# Patient Record
Sex: MALE | Race: BLACK OR AFRICAN AMERICAN | Employment: OTHER | URBAN - METROPOLITAN AREA
[De-identification: names, ages, dates, MRNs, and addresses within clinical notes are randomized per-mention and may not be internally consistent; named-entity substitution may affect disease eponyms.]

---

## 2019-01-15 ENCOUNTER — HOSPITAL ENCOUNTER (OUTPATIENT)
Dept: LAB | Age: 79
Discharge: HOME | End: 2019-01-15

## 2019-01-15 PROCEDURE — 88305 TISSUE EXAM BY PATHOLOGIST: CPT

## 2021-01-05 ENCOUNTER — APPOINTMENT (OUTPATIENT)
Dept: CT IMAGING | Age: 81
DRG: 066 | End: 2021-01-05
Attending: EMERGENCY MEDICINE
Payer: MEDICARE

## 2021-01-05 ENCOUNTER — APPOINTMENT (OUTPATIENT)
Dept: GENERAL RADIOLOGY | Age: 81
DRG: 066 | End: 2021-01-05
Attending: EMERGENCY MEDICINE
Payer: MEDICARE

## 2021-01-05 ENCOUNTER — HOSPITAL ENCOUNTER (INPATIENT)
Age: 81
LOS: 8 days | Discharge: HOME | DRG: 066 | End: 2021-01-13
Attending: EMERGENCY MEDICINE | Admitting: NEUROLOGICAL SURGERY
Payer: MEDICARE

## 2021-01-05 DIAGNOSIS — I60.9 SUBARACHNOID HEMORRHAGE (HCC): ICD-10-CM

## 2021-01-05 DIAGNOSIS — I60.9 SAH (SUBARACHNOID HEMORRHAGE) (HCC): Primary | ICD-10-CM

## 2021-01-05 DIAGNOSIS — R30.0 DYSURIA: ICD-10-CM

## 2021-01-05 LAB
ALBUMIN SERPL-MCNC: 3.8 G/DL (ref 3.2–4.6)
ALBUMIN/GLOB SERPL: 0.9 {RATIO} (ref 1.2–3.5)
ALP SERPL-CCNC: 91 U/L (ref 50–136)
ALT SERPL-CCNC: 25 U/L (ref 12–65)
AMPHET UR QL SCN: NEGATIVE
ANION GAP SERPL CALC-SCNC: 4 MMOL/L (ref 7–16)
APTT PPP: 32.7 SEC (ref 24.1–35.1)
AST SERPL-CCNC: 21 U/L (ref 15–37)
ATRIAL RATE: 104 BPM
BARBITURATES UR QL SCN: NEGATIVE
BASOPHILS # BLD: 0.1 K/UL (ref 0–0.2)
BASOPHILS NFR BLD: 1 % (ref 0–2)
BENZODIAZ UR QL: NEGATIVE
BILIRUB SERPL-MCNC: 0.7 MG/DL (ref 0.2–1.1)
BUN SERPL-MCNC: 11 MG/DL (ref 8–23)
CALCIUM SERPL-MCNC: 9.1 MG/DL (ref 8.3–10.4)
CALCULATED P AXIS, ECG09: 84 DEGREES
CALCULATED R AXIS, ECG10: 62 DEGREES
CALCULATED T AXIS, ECG11: 59 DEGREES
CANNABINOIDS UR QL SCN: NEGATIVE
CHLORIDE SERPL-SCNC: 108 MMOL/L (ref 98–107)
CHOLEST SERPL-MCNC: 147 MG/DL
CK SERPL-CCNC: 161 U/L (ref 21–215)
CO2 SERPL-SCNC: 27 MMOL/L (ref 21–32)
COCAINE UR QL SCN: NEGATIVE
CREAT SERPL-MCNC: 1.53 MG/DL (ref 0.8–1.5)
DIAGNOSIS, 93000: NORMAL
DIFFERENTIAL METHOD BLD: ABNORMAL
EOSINOPHIL # BLD: 0.5 K/UL (ref 0–0.8)
EOSINOPHIL NFR BLD: 10 % (ref 0.5–7.8)
ERYTHROCYTE [DISTWIDTH] IN BLOOD BY AUTOMATED COUNT: 13 % (ref 11.9–14.6)
GLOBULIN SER CALC-MCNC: 4.4 G/DL (ref 2.3–3.5)
GLUCOSE SERPL-MCNC: 141 MG/DL (ref 65–100)
HCT VFR BLD AUTO: 41.3 % (ref 41.1–50.3)
HDLC SERPL-MCNC: 50 MG/DL (ref 40–60)
HDLC SERPL: 2.9 {RATIO}
HGB BLD-MCNC: 13.4 G/DL (ref 13.6–17.2)
IMM GRANULOCYTES # BLD AUTO: 0 K/UL (ref 0–0.5)
IMM GRANULOCYTES NFR BLD AUTO: 0 % (ref 0–5)
INR PPP: 1
LDLC SERPL CALC-MCNC: 84.8 MG/DL
LIPID PROFILE,FLP: NORMAL
LYMPHOCYTES # BLD: 2 K/UL (ref 0.5–4.6)
LYMPHOCYTES NFR BLD: 38 % (ref 13–44)
MAGNESIUM SERPL-MCNC: 1.8 MG/DL (ref 1.8–2.4)
MAGNESIUM SERPL-MCNC: 2.2 MG/DL (ref 1.8–2.4)
MCH RBC QN AUTO: 31.7 PG (ref 26.1–32.9)
MCHC RBC AUTO-ENTMCNC: 32.4 G/DL (ref 31.4–35)
MCV RBC AUTO: 97.6 FL (ref 79.6–97.8)
METHADONE UR QL: NEGATIVE
MONOCYTES # BLD: 0.4 K/UL (ref 0.1–1.3)
MONOCYTES NFR BLD: 8 % (ref 4–12)
NEUTS SEG # BLD: 2.2 K/UL (ref 1.7–8.2)
NEUTS SEG NFR BLD: 43 % (ref 43–78)
NRBC # BLD: 0 K/UL (ref 0–0.2)
OPIATES UR QL: NEGATIVE
P-R INTERVAL, ECG05: 156 MS
PCP UR QL: NEGATIVE
PLATELET # BLD AUTO: 201 K/UL (ref 150–450)
PMV BLD AUTO: 10.3 FL (ref 9.4–12.3)
POTASSIUM SERPL-SCNC: 3.5 MMOL/L (ref 3.5–5.1)
PROT SERPL-MCNC: 8.2 G/DL (ref 6.3–8.2)
PROTHROMBIN TIME: 13.5 SEC (ref 12.5–14.7)
Q-T INTERVAL, ECG07: 318 MS
QRS DURATION, ECG06: 80 MS
QTC CALCULATION (BEZET), ECG08: 418 MS
RBC # BLD AUTO: 4.23 M/UL (ref 4.23–5.6)
SODIUM SERPL-SCNC: 139 MMOL/L (ref 138–145)
TRIGL SERPL-MCNC: 61 MG/DL (ref 35–150)
TROPONIN-HIGH SENSITIVITY: 18.5 PG/ML (ref 0–14)
TROPONIN-HIGH SENSITIVITY: 9.8 PG/ML (ref 0–14)
VENTRICULAR RATE, ECG03: 104 BPM
VLDLC SERPL CALC-MCNC: 12.2 MG/DL (ref 6–23)
WBC # BLD AUTO: 5.1 K/UL (ref 4.3–11.1)

## 2021-01-05 PROCEDURE — 36573 INSJ PICC RS&I 5 YR+: CPT | Performed by: NURSE PRACTITIONER

## 2021-01-05 PROCEDURE — 74011000258 HC RX REV CODE- 258: Performed by: EMERGENCY MEDICINE

## 2021-01-05 PROCEDURE — 74011250636 HC RX REV CODE- 250/636: Performed by: NURSE PRACTITIONER

## 2021-01-05 PROCEDURE — 85025 COMPLETE CBC W/AUTO DIFF WBC: CPT

## 2021-01-05 PROCEDURE — 83735 ASSAY OF MAGNESIUM: CPT

## 2021-01-05 PROCEDURE — 85730 THROMBOPLASTIN TIME PARTIAL: CPT

## 2021-01-05 PROCEDURE — 80053 COMPREHEN METABOLIC PANEL: CPT

## 2021-01-05 PROCEDURE — 74011000250 HC RX REV CODE- 250: Performed by: NEUROLOGICAL SURGERY

## 2021-01-05 PROCEDURE — 83036 HEMOGLOBIN GLYCOSYLATED A1C: CPT

## 2021-01-05 PROCEDURE — 74011250636 HC RX REV CODE- 250/636: Performed by: EMERGENCY MEDICINE

## 2021-01-05 PROCEDURE — 76937 US GUIDE VASCULAR ACCESS: CPT

## 2021-01-05 PROCEDURE — 65610000006 HC RM INTENSIVE CARE

## 2021-01-05 PROCEDURE — 82550 ASSAY OF CK (CPK): CPT

## 2021-01-05 PROCEDURE — 74011250636 HC RX REV CODE- 250/636: Performed by: NEUROLOGICAL SURGERY

## 2021-01-05 PROCEDURE — 99285 EMERGENCY DEPT VISIT HI MDM: CPT

## 2021-01-05 PROCEDURE — 99222 1ST HOSP IP/OBS MODERATE 55: CPT | Performed by: NEUROLOGICAL SURGERY

## 2021-01-05 PROCEDURE — 80307 DRUG TEST PRSMV CHEM ANLYZR: CPT

## 2021-01-05 PROCEDURE — 71045 X-RAY EXAM CHEST 1 VIEW: CPT

## 2021-01-05 PROCEDURE — 74011250637 HC RX REV CODE- 250/637: Performed by: NURSE PRACTITIONER

## 2021-01-05 PROCEDURE — C1751 CATH, INF, PER/CENT/MIDLINE: HCPCS

## 2021-01-05 PROCEDURE — 74011000250 HC RX REV CODE- 250: Performed by: EMERGENCY MEDICINE

## 2021-01-05 PROCEDURE — 74011000636 HC RX REV CODE- 636: Performed by: EMERGENCY MEDICINE

## 2021-01-05 PROCEDURE — 94762 N-INVAS EAR/PLS OXIMTRY CONT: CPT

## 2021-01-05 PROCEDURE — 85610 PROTHROMBIN TIME: CPT

## 2021-01-05 PROCEDURE — 80061 LIPID PANEL: CPT

## 2021-01-05 PROCEDURE — C8929 TTE W OR WO FOL WCON,DOPPLER: HCPCS

## 2021-01-05 PROCEDURE — 84484 ASSAY OF TROPONIN QUANT: CPT

## 2021-01-05 PROCEDURE — 70498 CT ANGIOGRAPHY NECK: CPT

## 2021-01-05 PROCEDURE — 70450 CT HEAD/BRAIN W/O DYE: CPT

## 2021-01-05 RX ORDER — FENTANYL CITRATE 50 UG/ML
50 INJECTION, SOLUTION INTRAMUSCULAR; INTRAVENOUS
Status: DISCONTINUED | OUTPATIENT
Start: 2021-01-05 | End: 2021-01-13 | Stop reason: HOSPADM

## 2021-01-05 RX ORDER — SODIUM CHLORIDE 9 MG/ML
75 INJECTION, SOLUTION INTRAVENOUS CONTINUOUS
Status: DISCONTINUED | OUTPATIENT
Start: 2021-01-05 | End: 2021-01-10

## 2021-01-05 RX ORDER — AMOXICILLIN 250 MG
2 CAPSULE ORAL
Status: DISCONTINUED | OUTPATIENT
Start: 2021-01-05 | End: 2021-01-13 | Stop reason: HOSPADM

## 2021-01-05 RX ORDER — PRAVASTATIN SODIUM 40 MG/1
40 TABLET ORAL
COMMUNITY

## 2021-01-05 RX ORDER — TAMSULOSIN HYDROCHLORIDE 0.4 MG/1
0.4 CAPSULE ORAL DAILY
COMMUNITY

## 2021-01-05 RX ORDER — TELMISARTAN AND HYDROCHLORTHIAZIDE 40; 12.5 MG/1; MG/1
1 TABLET ORAL DAILY
COMMUNITY

## 2021-01-05 RX ORDER — KETOROLAC TROMETHAMINE 30 MG/ML
15 INJECTION, SOLUTION INTRAMUSCULAR; INTRAVENOUS
Status: DISPENSED | OUTPATIENT
Start: 2021-01-05 | End: 2021-01-10

## 2021-01-05 RX ORDER — LATANOPROST 50 UG/ML
1 SOLUTION/ DROPS OPHTHALMIC
COMMUNITY

## 2021-01-05 RX ORDER — ACETAMINOPHEN 325 MG/1
650 TABLET ORAL
Status: DISCONTINUED | OUTPATIENT
Start: 2021-01-05 | End: 2021-01-11

## 2021-01-05 RX ORDER — MAGNESIUM SULFATE HEPTAHYDRATE 40 MG/ML
2 INJECTION, SOLUTION INTRAVENOUS DAILY PRN
Status: DISCONTINUED | OUTPATIENT
Start: 2021-01-05 | End: 2021-01-12

## 2021-01-05 RX ORDER — ATORVASTATIN CALCIUM 40 MG/1
40 TABLET, FILM COATED ORAL
Status: DISCONTINUED | OUTPATIENT
Start: 2021-01-05 | End: 2021-01-13 | Stop reason: HOSPADM

## 2021-01-05 RX ORDER — LANOLIN ALCOHOL/MO/W.PET/CERES
400 CREAM (GRAM) TOPICAL 2 TIMES DAILY
Status: COMPLETED | OUTPATIENT
Start: 2021-01-06 | End: 2021-01-10

## 2021-01-05 RX ORDER — NIMODIPINE 30 MG/1
60 CAPSULE, LIQUID FILLED ORAL EVERY 4 HOURS
Status: DISCONTINUED | OUTPATIENT
Start: 2021-01-05 | End: 2021-01-10

## 2021-01-05 RX ORDER — SODIUM CHLORIDE 0.9 % (FLUSH) 0.9 %
10 SYRINGE (ML) INJECTION
Status: COMPLETED | OUTPATIENT
Start: 2021-01-05 | End: 2021-01-05

## 2021-01-05 RX ORDER — LATANOPROST 50 UG/ML
1 SOLUTION/ DROPS OPHTHALMIC EVERY EVENING
Status: CANCELLED | OUTPATIENT
Start: 2021-01-05

## 2021-01-05 RX ORDER — MAGNESIUM SULFATE HEPTAHYDRATE 40 MG/ML
4 INJECTION, SOLUTION INTRAVENOUS DAILY PRN
Status: DISCONTINUED | OUTPATIENT
Start: 2021-01-05 | End: 2021-01-12

## 2021-01-05 RX ORDER — BUDESONIDE AND FORMOTEROL FUMARATE DIHYDRATE 160; 4.5 UG/1; UG/1
2 AEROSOL RESPIRATORY (INHALATION) DAILY
COMMUNITY

## 2021-01-05 RX ORDER — ONDANSETRON 2 MG/ML
4 INJECTION INTRAMUSCULAR; INTRAVENOUS
Status: DISCONTINUED | OUTPATIENT
Start: 2021-01-05 | End: 2021-01-13 | Stop reason: HOSPADM

## 2021-01-05 RX ORDER — GUAIFENESIN 100 MG/5ML
81 LIQUID (ML) ORAL DAILY
COMMUNITY

## 2021-01-05 RX ADMIN — FENTANYL CITRATE 50 MCG: 50 INJECTION, SOLUTION INTRAMUSCULAR; INTRAVENOUS at 21:12

## 2021-01-05 RX ADMIN — SODIUM CHLORIDE 5 MG/HR: 900 INJECTION, SOLUTION INTRAVENOUS at 13:03

## 2021-01-05 RX ADMIN — NIMODIPINE 60 MG: 30 CAPSULE, LIQUID FILLED ORAL at 20:00

## 2021-01-05 RX ADMIN — NIMODIPINE 60 MG: 30 CAPSULE, LIQUID FILLED ORAL at 23:01

## 2021-01-05 RX ADMIN — NIMODIPINE 60 MG: 30 CAPSULE, LIQUID FILLED ORAL at 15:09

## 2021-01-05 RX ADMIN — SODIUM CHLORIDE 100 ML: 900 INJECTION, SOLUTION INTRAVENOUS at 12:42

## 2021-01-05 RX ADMIN — MAGNESIUM SULFATE HEPTAHYDRATE 4 G: 40 INJECTION, SOLUTION INTRAVENOUS at 22:26

## 2021-01-05 RX ADMIN — SODIUM CHLORIDE 1000 ML: 900 INJECTION, SOLUTION INTRAVENOUS at 13:22

## 2021-01-05 RX ADMIN — SODIUM CHLORIDE 1000 ML: 900 INJECTION, SOLUTION INTRAVENOUS at 13:23

## 2021-01-05 RX ADMIN — FENTANYL CITRATE 50 MCG: 50 INJECTION, SOLUTION INTRAMUSCULAR; INTRAVENOUS at 13:55

## 2021-01-05 RX ADMIN — KETOROLAC TROMETHAMINE 15 MG: 30 INJECTION, SOLUTION INTRAMUSCULAR; INTRAVENOUS at 14:10

## 2021-01-05 RX ADMIN — Medication 10 ML: at 12:42

## 2021-01-05 RX ADMIN — IOPAMIDOL 50 ML: 755 INJECTION, SOLUTION INTRAVENOUS at 12:42

## 2021-01-05 RX ADMIN — DOCUSATE SODIUM 50 MG AND SENNOSIDES 8.6 MG 2 TABLET: 8.6; 5 TABLET, FILM COATED ORAL at 21:06

## 2021-01-05 RX ADMIN — PERFLUTREN 1 ML: 6.52 INJECTION, SUSPENSION INTRAVENOUS at 14:30

## 2021-01-05 RX ADMIN — ATORVASTATIN CALCIUM 40 MG: 40 TABLET, FILM COATED ORAL at 21:06

## 2021-01-05 RX ADMIN — SODIUM CHLORIDE 75 ML/HR: 900 INJECTION, SOLUTION INTRAVENOUS at 18:00

## 2021-01-05 NOTE — PROGRESS NOTES
SPEECH PATHOLOGY NOTE:    Speech therapy consult received and appreciated. Patient getting ECHO at time of therapy attempt.  Will follow up in AM.     Carisa Casanova [address] CCC-SLP

## 2021-01-05 NOTE — PROGRESS NOTES
PICC Placement Note    PRE-PROCEDURE VERIFICATION  Correct Procedure: yes. Time out completed with assistant eric florez rn and all persons present in agreement with time out. Correct Site:  yes  Temperature: Temp: 98.1 °F (36.7 °C), Temperature Source: Temp Source: Oral  Recent Labs     01/05/21  1137   BUN 11   CREA 1.53*      INR 1.0   WBC 5.1     Allergies: Patient has no known allergies. Education materials for [facility] given to patient or family. PROCEDURE DETAIL  A triple lumen PICC line was started for vascular access. The following documentation is in addition to the PICC properties in the lines/airways flowsheet :  Lot #: [id number]  xylocaine used: yes  Mid-Arm Circumference: 39 (cm)  Internal Catheter Length: 42 (cm)  Internal Catheter Total Length: 42 (cm)  Vein Selection for PICC:right basilic  Central Line Bundle followed yes  Complication Related to Insertion: none  Both the insertion guidewire and ECG guidewire were removed intact all ports have positive blood return and were flush well with normal saline. The location of the tip of the PICC is verified using ECG technology. The tip is in the SVC per ECG reading. See image below.              Line is okay to use: yes

## 2021-01-05 NOTE — CONSULTS
Caro Magallon MD  Medical Director  [address] [facility], [address] [facility]  Tel: [phone]       Physical Medicine & Rehabilitation Consult    Subjective:     Date of Consultation:  2021  Referring Physician: Neuroendovascular Surgery service / RAMÓN Hall. Otilia Huerta is a 79 y.o. Black male who is being evaluated at the request of Neuroendovascular Surgery service for consideration for inpatient rehabilitation following subarachnoid hemorrhage. HPI: The patient is a right-hand dominant, previously functionally independent 78yo BM with PMH including HTN, HL, BPH, hypothyroidism, asthma, seasonal allergies. He presented to the ED today after syncopal episode at home; he reports he leaned over to attach his dog's leash, felt lightheaded then woke up ~25min later on the floor. In ED, he was tachycardic with left frontal HA and posterior neck pain. Head CT noted subarachnoid and subdural hemorrhage, CTA without obvious vascular abnormality. Hgb 13.4, Cr 1.53, UDS negative. CXR with minimal LLL infiltrate / atelectasis. He was admitted by Neuroendovascular Surgery service with plans for cerebral angiogram tomorrow. We were consulted in anticipation of rehab needs. Today he is alert and agreeable to exam. He reports persistent left frontal HA and posterior neck pain. He notes ~2-3wks of daily, intermittent left-sided tingling, UE>>LE, lasting 10-15s and resolving spontaneously. He reports mild vision deterioration that was evaluated and determined benign by his optometrist ~3wks ago. He admits to recent mild exacerbation of seasonal allergies but denies fever, chills, cough or dyspnea. He reports independence with ADLs. IADLs, mobility and driving. He lives alone since his wife  in 2017 and reports he still works part-time as a  for [employer] and [employer].  He went to college and graduate school in [address] [place] [address] but is a [place]. Active Problems:    SAH (subarachnoid hemorrhage) ([facility] [address] (2021)       Past Medical History  HTN, HL, BPH, hypothyroidism, obesity, asthma, seasonal allergies    Past Surgical History  Left knee arthroscopy    No Family History on file    Social History     Tobacco Use    Smoking status: Never   Substance Use Topics    Alcohol use: No     Prior to Admission medications    Not on File     No Known Allergies     Review of Systems:   Constitutional: negative for fevers, chills, fatigue and malaise  Eyes: positive for visual changes, macular degeneration, negative for amaurosis fugax  Ears, nose, mouth, throat, and face: positive for nasal congestion, post-nasal drainage, negative for hearing loss, sore throat and hoarseness  Respiratory: positive for asthma, negative for cough, sputum, wheezing or dyspnea on exertion  Cardiovascular: negative for chest pain, palpitations, claudication  Gastrointestinal: negative for nausea, vomiting, change in bowel habits and abdominal pain  Genitourinary: positive for hesitancy, negative for frequency, dysuria and hematuria  Musculoskeletal: positive for neck pain and recent, brief, intermittent left hemibody tingling, negative for myalgias, arthralgias, back pain and muscle weakness  Neurological: positive for headaches, negative for vertigo, seizures, memory problems, speech problems, coordination problems, gait problems, tremor and weakness      Objective:     Vitals:  Blood pressure (!) 115/59, pulse (!) 119, temperature 98.1 °F (36.7 °C), resp. rate 20, height 5' 7.5\" (1.715 m), weight 260 lb (117.9 kg), SpO2 97 %. Temp (24hrs), Av.1 °F (36.7 °C), Min:98.1 °F (36.7 °C), Max:98.1 °F (36.7 °C)      Intake and Output:  No intake/output data recorded.     Physical Exam:  General: Alert, obese, delightfully interactive 78yo Black male   Skin:  Warm, moist with texture appropriate for age  Eyes:  Arcus senilis, mildly injected sclera, extraocular muscles intact without nystagmus  HENT:  Normocephalic; nares patent, oral mucosa moist, neck is thick but supple; trachea midline  Respiratory: Lungs clear to auscultation bilaterally, breathing is non-labored   Chest:  Symmetric throughout one respiratory excursion; no supraclavicular lymphadenopathy  CV:  Tachycardic, regular underlying rhythm, no appreciable murmur  Abdomen: Soft, nontender, obese and protuberant but non-distended; bowel sounds normoactive   Extremities: UE strength at biceps, triceps, finger flexion 5/5 and symmetric; lifts B LE off bed against gravity, strength at hip flexion 5-/5, knee extension / flexion 5/5, ankle DF/PF 5/5 bilaterally; no edema, cyanosis, clubbing  Neurological: Oriented to person, year, city, president; good attention and focus, no dysarthria or word-finding deficits; face grossly symmetric; EOM intact without nystagmus, visual fields are full to finger confrontation; symmetric shoulder shrug; able to read, perform simple math, identify common object and state its use (in [place] and in [facility])      Labs/Studies:  Recent Results (from the past 72 hour(s))   CBC WITH AUTOMATED DIFF    Collection Time: 01/05/21 11:37 AM   Result Value Ref Range    WBC 5.1 4.3 - 11.1 K/uL    RBC 4.23 4.23 - 5.6 M/uL    HGB 13.4 (L) 13.6 - 17.2 g/dL    HCT 41.3 41.1 - 50.3 %    MCV 97.6 79.6 - 97.8 FL    MCH 31.7 26.1 - 32.9 PG    MCHC 32.4 31.4 - 35.0 g/dL    RDW 13.0 11.9 - 14.6 %    PLATELET 831 173 - 603 K/uL    MPV 10.3 9.4 - 12.3 FL    ABSOLUTE NRBC 0.00 0.0 - 0.2 K/uL    DF AUTOMATED      NEUTROPHILS 43 43 - 78 %    LYMPHOCYTES 38 13 - 44 %    MONOCYTES 8 4.0 - 12.0 %    EOSINOPHILS 10 (H) 0.5 - 7.8 %    BASOPHILS 1 0.0 - 2.0 %    IMMATURE GRANULOCYTES 0 0.0 - 5.0 %    ABS. NEUTROPHILS 2.2 1.7 - 8.2 K/UL    ABS. LYMPHOCYTES 2.0 0.5 - 4.6 K/UL    ABS. MONOCYTES 0.4 0.1 - 1.3 K/UL    ABS. EOSINOPHILS 0.5 0.0 - 0.8 K/UL    ABS. BASOPHILS 0.1 0.0 - 0.2 K/UL    ABS. IMM.  GRANS. 0.0 0.0 - 0.5 K/UL   METABOLIC PANEL, COMPREHENSIVE    Collection Time: 01/05/21 11:37 AM   Result Value Ref Range    Sodium 139 138 - 145 mmol/L    Potassium 3.5 3.5 - 5.1 mmol/L    Chloride 108 (H) 98 - 107 mmol/L    CO2 27 21 - 32 mmol/L    Anion gap 4 (L) 7 - 16 mmol/L    Glucose 141 (H) 65 - 100 mg/dL    BUN 11 8 - 23 MG/DL    Creatinine 1.53 (H) 0.8 - 1.5 MG/DL    GFR est AA 56 (L) >60 ml/min/1.73m2    GFR est non-AA 47 (L) >60 ml/min/1.73m2    Calcium 9.1 8.3 - 10.4 MG/DL    Bilirubin, total 0.7 0.2 - 1.1 MG/DL    ALT (SGPT) 25 12 - 65 U/L    AST (SGOT) 21 15 - 37 U/L    Alk. phosphatase 91 50 - 136 U/L    Protein, total 8.2 6.3 - 8.2 g/dL    Albumin 3.8 3.2 - 4.6 g/dL    Globulin 4.4 (H) 2.3 - 3.5 g/dL    A-G Ratio 0.9 (L) 1.2 - 3.5     TROPONIN-HIGH SENSITIVITY    Collection Time: 01/05/21 11:37 AM   Result Value Ref Range    Troponin-High Sensitivity 9.8 0 - 14 pg/mL   MAGNESIUM    Collection Time: 01/05/21 11:37 AM   Result Value Ref Range    Magnesium 2.2 1.8 - 2.4 mg/dL   PTT    Collection Time: 01/05/21 11:37 AM   Result Value Ref Range    aPTT 32.7 24.1 - 35.1 SEC   PROTHROMBIN TIME + INR    Collection Time: 01/05/21 11:37 AM   Result Value Ref Range    Prothrombin time 13.5 12.5 - 14.7 sec    INR 1.0     EKG, 12 LEAD, INITIAL    Collection Time: 01/05/21 11:37 AM   Result Value Ref Range    Ventricular Rate 104 BPM    Atrial Rate 104 BPM    P-R Interval 156 ms    QRS Duration 80 ms    Q-T Interval 318 ms    QTC Calculation (Bezet) 418 ms    Calculated P Axis 84 degrees    Calculated R Axis 62 degrees    Calculated T Axis 59 degrees    Diagnosis       !! AGE AND GENDER SPECIFIC ECG ANALYSIS !!   Sinus tachycardia  Otherwise normal ECG  When compared with ECG of 05-JAN-2021 11:36,  Previous ECG has undetermined rhythm, needs review     DRUG SCREEN, URINE    Collection Time: 01/05/21  1:38 PM   Result Value Ref Range    PCP(PHENCYCLIDINE) Negative      BENZODIAZEPINES Negative      COCAINE Negative AMPHETAMINES Negative      METHADONE Negative      THC (TH-CANNABINOL) Negative      OPIATES Negative      BARBITURATES Negative         Functional Exam:   Awaiting formal PT / OT evaluations post-cerebral angiogram.      Impression / Assessment:     Active Problems:    SAH (subarachnoid hemorrhage) ([facility] [address] (1/5/2021)      Plan / Recommendations / Medical Decision Making:     Recommendations:   Continue Acute Rehab Program  Coordination of rehab / medical care  Counseling of PM&R care issues management  Monitoring and management of medical conditions per plan of care / orders     · 78yo Black male with subarachnoid and subdural hemorrhage, admitted by Neuroendovascular Surgery service with plans for cerebral angiogram tomorrow  · We will re-evaluate patient post-procedure  · Await formal PT / OT evaluations post-cerebral angiogram    Discussion with Family / Caregiver / Staff    Reviewed Therapies / Jr Garcia / Nataliia Arambula / Records    Thank you very much for this referral. We appreciate the opportunity to participate in this patient's care. We will continue to follow. López Valadez PA-C  Physician Assistant with [facility]  Luciana Vegas MD, Medical Director

## 2021-01-05 NOTE — PROGRESS NOTES
Chart reviewed s/p admission to [facility] ICU per Dr. Aidee Lopez, NP for [facility]. Spoke with daughter, Ra Rodriguez, via phone. Confirms demographics. States pt lives alone, as wife recently passed. Daughter lives nearby and son, Drea Guerrier, in [facility]. She is not sure regarding LW/HCPOA, but states her and her brother do everything together. She is currently primary contact, but both children would be legal decision makers, as no HCPOA on file. 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 Does not use any DME's and no current HH or rehab. Drives self. Pt has PCP and insurance with MCR/BCBS/PEBA. Discussed possible needs for d/c with daughter and she is aware CM will follow for any d/c needs/POC. Care Management Interventions  PCP Verified by CM: Yes(Asif)  Mode of Transport at Discharge:  Other (see comment)  Transition of Care Consult (CM Consult): Discharge Planning  Discharge Durable Medical Equipment: (none)  Current Support Network: Lives Alone, Own Home  Confirm Follow Up Transport: Self  The Plan for Transition of Care is Related to the Following Treatment Goals : HH, outpt rehab vs STR  Name of the Patient Representative Who was Provided with a Choice of Provider and Agrees with the Discharge Plan: Khushbu reyna Brought of Choice List was Provided with Basic Dialogue that Supports the Patient's Individualized Plan of Care/Goals, Treatment Preferences and Shares the Quality Data Associated with the Providers?: Yes  The [employer] Information Provided?: (MCR/BCBS)  Discharge Location  Discharge Placement: Unable to determine at this time

## 2021-01-05 NOTE — ED PROVIDER NOTES
14-year-old male presents after syncopal episode at home. He was bending over to attach his dog's leash, and then suddenly woke up on the floor. He does not think he hit his head, but does have a diffuse headache. He has been having intermittent tingling in his left arm with chest pain over the past few days. Denies any recent cough, congestion, shortness of breath, nausea, vomiting, diarrhea, medications, or known exposure to Covid. Denies similar symptoms in the past.  No current chest pain. He did have a sensation of lightheadedness prior to passing out. No focal weakness. Dizziness  Associated symptoms include chest pain and headaches. Pertinent negatives include no shortness of breath, no vomiting, no confusion and no nausea. No past medical history on file. No past surgical history on file. No family history on file.     Social History     Socioeconomic History    Marital status:      Spouse name: Not on file    Number of children: Not on file    Years of education: Not on file    Highest education level: Not on file   Occupational History    Not on file   Social Needs    Financial resource strain: Not on file    Food insecurity     Worry: Not on file     Inability: Not on file    Transportation needs     Medical: Not on file     Non-medical: Not on file   Tobacco Use    Smoking status: Not on file   Substance and Sexual Activity    Alcohol use: Not on file    Drug use: Not on file    Sexual activity: Not on file   Lifestyle    Physical activity     Days per week: Not on file     Minutes per session: Not on file    Stress: Not on file   Relationships    Social connections     Talks on phone: Not on file     Gets together: Not on file     Attends Anabaptist service: Not on file     Active member of club or organization: Not on file     Attends meetings of clubs or organizations: Not on file     Relationship status: Not on file    Intimate partner violence     Fear of current or ex partner: Not on file     Emotionally abused: Not on file     Physically abused: Not on file     Forced sexual activity: Not on file   Other Topics Concern    Not on file   Social History Narrative    Not on file         ALLERGIES: Patient has no known allergies. 70-year-old male presents after syncopal episode at home    Review of Systems   Constitutional: Negative for chills and fever. HENT: Negative for hearing loss. Eyes: Negative for visual disturbance. Respiratory: Negative for cough and shortness of breath. Cardiovascular: Positive for chest pain. Negative for palpitations. Gastrointestinal: Negative for abdominal pain, diarrhea, nausea and vomiting. Musculoskeletal: Negative for back pain. Skin: Negative for rash. Neurological: Positive for dizziness, syncope, light-headedness, numbness and headaches. Negative for weakness. Psychiatric/Behavioral: Negative for confusion. Vitals:    01/05/21 1133   BP: (!) 162/88   Pulse: (!) 106   Resp: 18   Temp: 98.1 °F (36.7 °C)   Weight: 117.9 kg (260 lb)   Height: 5' 7.5\" (1.715 m)            Physical Exam  Vitals signs and nursing note reviewed. Constitutional:       Appearance: He is well-developed. HENT:      Head: Normocephalic and atraumatic. Eyes:      Pupils: Pupils are equal, round, and reactive to light. Neck:      Musculoskeletal: Normal range of motion and neck supple. Cardiovascular:      Rate and Rhythm: Regular rhythm. Tachycardia present. Heart sounds: Normal heart sounds. Pulmonary:      Effort: Pulmonary effort is normal.      Breath sounds: Normal breath sounds. Abdominal:      Palpations: Abdomen is soft. Tenderness: There is no abdominal tenderness. Musculoskeletal: Normal range of motion. Skin:     General: Skin is warm and dry. Neurological:      Mental Status: He is alert.    Psychiatric:         Behavior: Behavior normal.          MDM  Number of Diagnoses or Management Options  Diagnosis management comments: Parts of this document were created using dragon voice recognition software. The chart has been reviewed but errors may still be present. I wore appropriate PPE throughout this patient's ED visit. Mary Gaming MD, 11:41 AM    No sign of head trauma. No scalp tenderness. No ischemia on EKG. . Ventricular rate 104 showing sinus tachycardia. IL interval 156.    12:39 PM  CT shows subarachnoid hemorrhage. Sent back for CTA. Cardene drip ordered with goal systolic pressure less than 140. Joanna Beyer Discussed with Christos Huff, neuro interventionalists with Dr. Sergio Alexander. Patient updated. Has persistent headache, no nausea or vomiting. 1:14 PM  Admitted by neurology. No obvious aneurysm.        Amount and/or Complexity of Data Reviewed  Clinical lab tests: reviewed and ordered (Results for orders placed or performed during the hospital encounter of 01/05/21  -CBC WITH AUTOMATED DIFF       Result                      Value             Ref Range           WBC                         5.1               4.3 - 11.1 K*       RBC                         4.23              4.23 - 5.6 M*       HGB                         13.4 (L)          13.6 - 17.2 *       HCT                         41.3              41.1 - 50.3 %       MCV                         97.6              79.6 - 97.8 *       MCH                         31.7              26.1 - 32.9 *       MCHC                        32.4              31.4 - 35.0 *       RDW                         13.0              11.9 - 14.6 %       PLATELET                    201               150 - 450 K/*       MPV                         10.3              9.4 - 12.3 FL       ABSOLUTE NRBC               0.00              0.0 - 0.2 K/*       DF                          AUTOMATED                             NEUTROPHILS                 43                43 - 78 %           LYMPHOCYTES                 38                13 - 44 %           MONOCYTES 8                 4.0 - 12.0 %        EOSINOPHILS                 10 (H)            0.5 - 7.8 %         BASOPHILS                   1                 0.0 - 2.0 %         IMMATURE GRANULOCYTES       0                 0.0 - 5.0 %         ABS. NEUTROPHILS            2.2               1.7 - 8.2 K/*       ABS. LYMPHOCYTES            2.0               0.5 - 4.6 K/*       ABS. MONOCYTES              0.4               0.1 - 1.3 K/*       ABS. EOSINOPHILS            0.5               0.0 - 0.8 K/*       ABS. BASOPHILS              0.1               0.0 - 0.2 K/*       ABS. IMM. GRANS.            0.0               0.0 - 0.5 K/*  -METABOLIC PANEL, COMPREHENSIVE       Result                      Value             Ref Range           Sodium                      139               138 - 145 mm*       Potassium                   3.5               3.5 - 5.1 mm*       Chloride                    108 (H)           98 - 107 mmo*       CO2                         27                21 - 32 mmol*       Anion gap                   4 (L)             7 - 16 mmol/L       Glucose                     141 (H)           65 - 100 mg/*       BUN                         11                8 - 23 MG/DL        Creatinine                  1.53 (H)          0.8 - 1.5 MG*       GFR est AA                  56 (L)            >60 ml/min/1*       GFR est non-AA              47 (L)            >60 ml/min/1*       Calcium                     9.1               8.3 - 10.4 M*       Bilirubin, total            0.7               0.2 - 1.1 MG*       ALT (SGPT)                  25                12 - 65 U/L         AST (SGOT)                  21                15 - 37 U/L         Alk.  phosphatase            91                50 - 136 U/L        Protein, total              8.2               6.3 - 8.2 g/*       Albumin                     3.8               3.2 - 4.6 g/*       Globulin                    4.4 (H)           2.3 - 3.5 g/*       A-G Ratio 0.9 (L)           1.2 - 3.5      -TROPONIN-HIGH SENSITIVITY       Result                      Value             Ref Range           Troponin-High Sensitiv*     9.8               0 - 14 pg/mL   )  Tests in the radiology section of CPT®: reviewed and ordered (Ct Head Wo Cont    Result Date: 1/5/2021  Head CT INDICATION: Syncope, headache Multiple axial images obtained through the brain without intravenous contrast. Radiation dose reduction techniques were used for this study: All CT scans performed at this facility use one or all of the following: Automated exposure control, adjustment of the mA and/or kVp according to patient's size, iterative reconstruction. FINDINGS: There is diffuse subarachnoid hemorrhage, most evidence in the basilar cisterns. Small area of focal extra-axial hemorrhage is also noted in the high right parietal region, likely also subarachnoid. There is probably some subdural hemorrhage in the interhemispheric fissure posteriorly and layering along the left tentorium. There is no significant mass effect in the midline or ventricle. 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 There are no fractures. Paranasal sinuses are clear. IMPRESSION: Subarachnoid and subdural hemorrhage. While this could be posttraumatic, aneurysm rupture is not excluded. Consider CT evaluation. Critical results called to Dr. Miriam Wong   at 12:35       Cta Head Neck W Wo Cont    Result Date: 1/5/2021  HISTORY: 79years of age Male with subarachnoid hemorrhage with syncope and headache. EXAM: CTA HEAD NECK W WO CONT. Radiation reduction dose techniques were used for the study. Our CT scanner use one or all of the following- Automated exposure control, adjustment of the mA and/or KV according the patient size, iterative reconstruction. 3-D multiplanar reformations were performed at the workstation. All carotid artery stenosis percentages are based upon NASCET criteria if appropriate.  COMPARISON: No prior vascular imaging of the head or neck available. Noncontrast CT exam on 1/20/2021. FINDINGS: VASCULAR FINDINGS: Cervical CTA: There is a three-vessel branching pattern of the aortic arch. The right subclavian artery is grossly patent where visualized. The left subclavian artery is grossly patent where visualized. The right common carotid artery is grossly patent without definite evidence of stenosis. The right external carotid artery is patent. No significant atherosclerotic disease or stenosis of the right carotid bulb or ICA origin. The cervical right ICA is diffusely patent. The left vertebral artery is dominant. The cervical right vertebral artery is not adequately visualized at the origin but is diffusely small in caliber but patent beyond this level. The left common carotid artery is patent. Left external carotid artery is patent. No significant atherosclerotic disease or stenosis of the left carotid bulb or proximal ICA. The cervical left ICA is diffusely patent. The cervical left vertebral artery is diffusely patent. Cranial CTA: Limited CTA head examination due to suboptimal contrast timing with this exam being obtained in the mixed arterial and venous phases. Intracranial right internal carotid artery is patent. Intracranial left internal carotid artery is patent. There is atherosclerotic calcification of the bilateral cavernous segments without evidence of flow-limiting stenosis. Right and left A1 segments are grossly patent. Bilateral A2 segments are grossly patent. A 3 segments are not adequately evaluated. Right M1 is patent. Major proximal right MCA branches beyond the bifurcation are grossly patent. Left M1 is patent. Major proximal left MCA branches beyond the bifurcation are grossly patent. Intracranial right vertebral artery is severely diminutive but patent. Intracranial left vertebral artery is patent. Basilar artery is patent.  Right posterior cerebral artery is patent to the level of the mid P2 segment and lateral likely evaluated beyond this level. Left posterior cerebral artery is grossly patent to the level the P2 segment and not adequately evaluated beyond this level. No evidence of intracranial aneurysms. No evidence of proximal vessel occlusion. The dural venous sinuses are overall not adequately evaluated. NONVASCULAR FINDINGS: Head: There is limited evaluation of the previously noted subarachnoid and subdural hemorrhagic blood products noted on the same day CT noncontrast head exam. No large focal fluid collections. Orbital structures demonstrated findings of bilateral lens replacements. Calvarial and maxillofacial soft tissues are without evidence of significant acute abnormality. Neck: Thyroid gland is without dominant nodules. No evidence of significant cervical or upper thoracic adenopathy. Visualized upper lungs are without areas of prominent focal consolidation or masses. OSSEOUS STRUCTURES: Mastoid air cells are well aerated. Paranasal sinuses demonstrate some mild mucoperiosteal thickening throughout the bilateral ethmoid air cells and inferior maxillary sinuses. There are multilevel advanced degenerative changes of the mid to lower cervical spine, likely age-related. IMPRESSION: 1. Limited CTA head exam demonstrating no evidence of intracranial aneurysms or proximal vessel occlusion. 2. CTA neck exam demonstrating congenitally diminutive right vertebral artery. Remainder of the major cervical arterial vasculature without significant stenosis or occlusion. Xr Chest Port    Result Date: 1/5/2021  Chest X-ray INDICATION: Chest pain A portable AP view of the chest was obtained. FINDINGS: There is minimal infiltrate/atelectasis in the left lung base. Right lung is clear. There is mild cardiomegaly. The bony thorax is intact.       IMPRESSION: Minimal left lower lobe infiltrate/atelectasis     )  Tests in the medicine section of CPT®: ordered and reviewed           Critical Care  Performed by: Leigh Arauz MD  Authorized by: Leigh Arauz MD     Critical care provider statement:     Critical care time (minutes):  45    Critical care time was exclusive of:  Separately billable procedures and treating other patients    Critical care was necessary to treat or prevent imminent or life-threatening deterioration of the following conditions:  CNS failure or compromise    Critical care was time spent personally by me on the following activities:  Discussions with consultants, evaluation of patient's response to treatment, examination of patient, review of old charts, re-evaluation of patient's condition, pulse oximetry, ordering and review of radiographic studies, ordering and review of laboratory studies and ordering and performing treatments and interventions    I assumed direction of critical care for this patient from another provider in my specialty: yes

## 2021-01-05 NOTE — ED TRIAGE NOTES
Pt reports feeling light-headed this morning while getting ready to take his dog to the vet.  States he woke up on the floor and had a really bad headache.  Unsure if he hit his head.  Also c/o of left arm tingling and numbness for the last 3 days.

## 2021-01-05 NOTE — PROGRESS NOTES
EndoRN arrived at bedside ER4 for assessment - dual NIHSS completed with ER RN. NIHSS 1 for mild left facial droop (less teeth seen upon smiling). Pt hypertensive - see MAR for intervention. Tachycardic - see MAR for pain meds and IVFB.     Pt taken to ICU OF28 at 1400

## 2021-01-05 NOTE — H&P
History and Physical    Patient: Ravi Phan MRN: [id number]  SSN: [id number]    YOB: 1940  Age: 79 y.o. Sex: male      Subjective:      Cali Phan is a 79 y.o. male who was at home this am preparing to take his dog to the vet and he had a syncopal event. The pt denies any HA or worst HA of life in last 24 hours. The pt states he woke up on floor, his daughter had called to check on him because he was late for the Vet appointment and she called EMS when he told her what happened. The pt had a CT head that showed SAH, CTA of head and neck does not show any obvious vascular abnormality, but SAH is not traumatic in nature,so we will admit the pt to ICU under [address] protocol with plans for cerebral angiogram in am with Dr. Manohar Espinosa. The pt is aox3 on my exam, GCS: E4/V5/M6: 15. He states he has intermittent tingling in his left arm with chest pain over last few days, but denies any changes in vision, HA or NV. The pt states when he woke up he had Frontal lobe HA with posterior neck pain. Airway is patent. Pt denies any AC medications prior to admit or any known family hx of SAH/Vascular abnormality. MED HX: HTN, Hyperlipidemia, BPH, asthma, anemia, Syncope  SURGICAL HX: unknown at time of admit  SOCIAL HX: Lives with daughter  TOBACCO: NEVER  ETOH: NO      No Known Allergies    Review of Systems:  Constitutional: well nourished male in NAD  Eyes:  no change in visual acuity, no photophobia  Ears, nose, mouth, throat, and face: no  Odynphagia, dysphagia, no thrush or exudate, negative for chronic sinus congestion, recurrent headaches  Respiratory: negative for SOB, hemoptysis or cough  Cardiovascular: + Chest pain intermittently over last week, no current CP or SOB.   Gastrointestinal: negative for abdominal pain, no hematemesis,  Genitourinary: no urgency, frequency, or dysuria, no nocturia  Integument/breast: negative for skin rash or skin lesions  Hematologic/lymphatic: negative for known bleeding disorder  Musculoskeletal:negative for joint pain or joint tenderness  Neurological: denies any HA prior to syncope today. Current HA/posterior neck pain. Objective:     Vitals:    01/05/21 1133 01/05/21 1301 01/05/21 1303   BP: (!) 162/88 (!) 166/85 (!) 166/85   Pulse: (!) 106 (!) 111 (!) 116   Resp: 18 20    Temp: 98.1 °F (36.7 °C)     Weight: 260 lb (117.9 kg)     Height: 5' 7.5\" (1.715 m)          Physical Exam:  General:  Alert, cooperative, no distress,    HEENT: Supple, symmetrical, trachea midline, no adenopathy, thyroid: no enlargment/tenderness/nodules, no carotid bruit and no JVD. Lungs:   Clear to auscultation bilaterally. Heart:  Regular rate and rhythm, S1, S2 normal, no murmur, click, rub or gallop. Abdomen:   Soft, non-tender. Bowel sounds normal. No masses,  No organomegaly. Extremities: Extremities normal, atraumatic, no cyanosis or edema. Pulses: 2+ and symmetric all extremities. Skin: Skin color, texture, turgor normal. No rashes or lesions   Neurologic: CNII-XII intact. Normal strength, sensation and reflexes throughout. aox3, and follows all commands. Finger to nose bilat intact. [place]. Assessment:     Hospital Problems  Never Reviewed          Codes Class Noted POA    SAH (subarachnoid hemorrhage) ([facility] [address] ICD-10-CM: I60.9  ICD-9-CM: 991  1/5/2021 Unknown            FAUST ROTHMAN ON ADMIT: 2  VIZCARRA GRADE ON ADMIT: 2  DYSPHAGIA ON ADMIT: 0  MALLAMPATI ON ADMIT: 2  Plan:     NEURO:pt with sycopal event at home this am, pt with SAH, no obvious source of vascular abnormality, but does not appear traumatic. Pt GCS 15. Will admit to ICU under [facility] protocol. Will do cerebral angiogram tomorrow. Daughter, Fermín, has been updated and is NOK. Pt HA 9/10 frontal lobe/post neck, will tx pain. Consult PT/OT/ST/Rehab as warranted. TCD start tomorrow. Discussed pt with Dr. Kurtis Garcia and in agreement with plan of care. RESP:RA and tolerating well. CV:MAP 70-90.  cardene prn, trop neg, 2D echo pending. EKG pending. SCD. Start lovenox tomorrow. HEME: HH: 13/41,   NEPH:bun/cr: 11/1.53 ( last 1.3 in Jan/20). GI:NPO until STAND, can advance diet as tolerated. Pepcid, senna. ID:[id number], no abx  LINES:IV, will order PICC, alicia ramirez.      Signed By: Ja English NP     January 5, 2021

## 2021-01-06 ENCOUNTER — APPOINTMENT (OUTPATIENT)
Dept: OTHER | Age: 81
DRG: 066 | End: 2021-01-06
Attending: NURSE PRACTITIONER
Payer: MEDICARE

## 2021-01-06 LAB
ANION GAP SERPL CALC-SCNC: 5 MMOL/L (ref 7–16)
BASOPHILS # BLD: 0.1 K/UL (ref 0–0.2)
BASOPHILS NFR BLD: 1 % (ref 0–2)
BUN SERPL-MCNC: 10 MG/DL (ref 8–23)
CALCIUM SERPL-MCNC: 8.2 MG/DL (ref 8.3–10.4)
CHLORIDE SERPL-SCNC: 110 MMOL/L (ref 98–107)
CO2 SERPL-SCNC: 25 MMOL/L (ref 21–32)
CREAT SERPL-MCNC: 1.28 MG/DL (ref 0.8–1.5)
DIFFERENTIAL METHOD BLD: ABNORMAL
EOSINOPHIL # BLD: 0.3 K/UL (ref 0–0.8)
EOSINOPHIL NFR BLD: 4 % (ref 0.5–7.8)
ERYTHROCYTE [DISTWIDTH] IN BLOOD BY AUTOMATED COUNT: 12.8 % (ref 11.9–14.6)
GLUCOSE SERPL-MCNC: 113 MG/DL (ref 65–100)
HCT VFR BLD AUTO: 35.3 % (ref 41.1–50.3)
HGB BLD-MCNC: 11.4 G/DL (ref 13.6–17.2)
IMM GRANULOCYTES # BLD AUTO: 0 K/UL (ref 0–0.5)
IMM GRANULOCYTES NFR BLD AUTO: 0 % (ref 0–5)
LYMPHOCYTES # BLD: 2.2 K/UL (ref 0.5–4.6)
LYMPHOCYTES NFR BLD: 33 % (ref 13–44)
MAGNESIUM SERPL-MCNC: 2.8 MG/DL (ref 1.8–2.4)
MCH RBC QN AUTO: 31.8 PG (ref 26.1–32.9)
MCHC RBC AUTO-ENTMCNC: 32.3 G/DL (ref 31.4–35)
MCV RBC AUTO: 98.3 FL (ref 79.6–97.8)
MONOCYTES # BLD: 0.6 K/UL (ref 0.1–1.3)
MONOCYTES NFR BLD: 8 % (ref 4–12)
NEUTS SEG # BLD: 3.6 K/UL (ref 1.7–8.2)
NEUTS SEG NFR BLD: 54 % (ref 43–78)
NRBC # BLD: 0 K/UL (ref 0–0.2)
PLATELET # BLD AUTO: 181 K/UL (ref 150–450)
PMV BLD AUTO: 10.4 FL (ref 9.4–12.3)
POTASSIUM SERPL-SCNC: 3.6 MMOL/L (ref 3.5–5.1)
RBC # BLD AUTO: 3.59 M/UL (ref 4.23–5.6)
SODIUM SERPL-SCNC: 140 MMOL/L (ref 138–145)
TROPONIN-HIGH SENSITIVITY: 19.2 PG/ML (ref 0–14)
WBC # BLD AUTO: 6.7 K/UL (ref 4.3–11.1)

## 2021-01-06 PROCEDURE — C1769 GUIDE WIRE: HCPCS

## 2021-01-06 PROCEDURE — B31F1ZZ FLUOROSCOPY OF LEFT VERTEBRAL ARTERY USING LOW OSMOLAR CONTRAST: ICD-10-PCS | Performed by: NEUROLOGICAL SURGERY

## 2021-01-06 PROCEDURE — 74011250636 HC RX REV CODE- 250/636: Performed by: NURSE PRACTITIONER

## 2021-01-06 PROCEDURE — 84484 ASSAY OF TROPONIN QUANT: CPT

## 2021-01-06 PROCEDURE — 36223 PLACE CATH CAROTID/INOM ART: CPT | Performed by: NEUROLOGICAL SURGERY

## 2021-01-06 PROCEDURE — 74011250637 HC RX REV CODE- 250/637: Performed by: NURSE PRACTITIONER

## 2021-01-06 PROCEDURE — C1760 CLOSURE DEV, VASC: HCPCS

## 2021-01-06 PROCEDURE — 65610000006 HC RM INTENSIVE CARE

## 2021-01-06 PROCEDURE — 74011000250 HC RX REV CODE- 250: Performed by: NEUROLOGICAL SURGERY

## 2021-01-06 PROCEDURE — 85025 COMPLETE CBC W/AUTO DIFF WBC: CPT

## 2021-01-06 PROCEDURE — 76376 3D RENDER W/INTRP POSTPROCES: CPT

## 2021-01-06 PROCEDURE — 77030012468 HC VLV BLEEDBK CNTRL ABBT -B

## 2021-01-06 PROCEDURE — 36224 PLACE CATH CAROTD ART: CPT | Performed by: NEUROLOGICAL SURGERY

## 2021-01-06 PROCEDURE — C1887 CATHETER, GUIDING: HCPCS

## 2021-01-06 PROCEDURE — C1894 INTRO/SHEATH, NON-LASER: HCPCS

## 2021-01-06 PROCEDURE — 74011000250 HC RX REV CODE- 250: Performed by: NURSE PRACTITIONER

## 2021-01-06 PROCEDURE — 74011250636 HC RX REV CODE- 250/636: Performed by: NEUROLOGICAL SURGERY

## 2021-01-06 PROCEDURE — 77030003629 HC NDL PERC VASC COOK -A

## 2021-01-06 PROCEDURE — B3151ZZ FLUOROSCOPY OF BILATERAL COMMON CAROTID ARTERIES USING LOW OSMOLAR CONTRAST: ICD-10-PCS | Performed by: NEUROLOGICAL SURGERY

## 2021-01-06 PROCEDURE — 83735 ASSAY OF MAGNESIUM: CPT

## 2021-01-06 PROCEDURE — 80048 BASIC METABOLIC PNL TOTAL CA: CPT

## 2021-01-06 PROCEDURE — 99232 SBSQ HOSP IP/OBS MODERATE 35: CPT | Performed by: NEUROLOGICAL SURGERY

## 2021-01-06 PROCEDURE — B3171ZZ FLUOROSCOPY OF LEFT INTERNAL CAROTID ARTERY USING LOW OSMOLAR CONTRAST: ICD-10-PCS | Performed by: NEUROLOGICAL SURGERY

## 2021-01-06 PROCEDURE — 99231 SBSQ HOSP IP/OBS SF/LOW 25: CPT | Performed by: PHYSICAL MEDICINE & REHABILITATION

## 2021-01-06 PROCEDURE — 36226 PLACE CATH VERTEBRAL ART: CPT | Performed by: NEUROLOGICAL SURGERY

## 2021-01-06 PROCEDURE — 74011000636 HC RX REV CODE- 636: Performed by: NEUROLOGICAL SURGERY

## 2021-01-06 RX ORDER — TAMSULOSIN HYDROCHLORIDE 0.4 MG/1
0.4 CAPSULE ORAL DAILY
Status: DISCONTINUED | OUTPATIENT
Start: 2021-01-06 | End: 2021-01-13 | Stop reason: HOSPADM

## 2021-01-06 RX ORDER — LIDOCAINE HYDROCHLORIDE 20 MG/ML
0.2 INJECTION, SOLUTION INFILTRATION; PERINEURAL ONCE
Status: COMPLETED | OUTPATIENT
Start: 2021-01-06 | End: 2021-01-06

## 2021-01-06 RX ORDER — ENOXAPARIN SODIUM 100 MG/ML
30 INJECTION SUBCUTANEOUS EVERY 24 HOURS
Status: DISCONTINUED | OUTPATIENT
Start: 2021-01-06 | End: 2021-01-13 | Stop reason: HOSPADM

## 2021-01-06 RX ORDER — MIDAZOLAM HYDROCHLORIDE 1 MG/ML
.5-2 INJECTION, SOLUTION INTRAMUSCULAR; INTRAVENOUS
Status: DISCONTINUED | OUTPATIENT
Start: 2021-01-06 | End: 2021-01-06

## 2021-01-06 RX ORDER — FAMOTIDINE 20 MG/1
20 TABLET, FILM COATED ORAL DAILY
Status: DISCONTINUED | OUTPATIENT
Start: 2021-01-07 | End: 2021-01-13 | Stop reason: HOSPADM

## 2021-01-06 RX ORDER — LATANOPROST 50 UG/ML
1 SOLUTION/ DROPS OPHTHALMIC
Status: DISCONTINUED | OUTPATIENT
Start: 2021-01-06 | End: 2021-01-13 | Stop reason: HOSPADM

## 2021-01-06 RX ORDER — FENTANYL CITRATE 50 UG/ML
25-50 INJECTION, SOLUTION INTRAMUSCULAR; INTRAVENOUS
Status: DISCONTINUED | OUTPATIENT
Start: 2021-01-06 | End: 2021-01-06

## 2021-01-06 RX ORDER — SODIUM CHLORIDE 9 MG/ML
25 INJECTION, SOLUTION INTRAVENOUS ONCE
Status: COMPLETED | OUTPATIENT
Start: 2021-01-06 | End: 2021-01-06

## 2021-01-06 RX ORDER — HEPARIN SODIUM 200 [USP'U]/100ML
1000 INJECTION, SOLUTION INTRAVENOUS AS NEEDED
Status: DISCONTINUED | OUTPATIENT
Start: 2021-01-06 | End: 2021-01-06

## 2021-01-06 RX ADMIN — IOPAMIDOL 150 ML: 612 INJECTION, SOLUTION INTRAVENOUS at 21:47

## 2021-01-06 RX ADMIN — HEPARIN SODIUM 2000 UNITS: 200 INJECTION, SOLUTION INTRAVENOUS at 21:25

## 2021-01-06 RX ADMIN — MAGNESIUM GLUCONATE 500 MG ORAL TABLET 400 MG: 500 TABLET ORAL at 17:24

## 2021-01-06 RX ADMIN — FENTANYL CITRATE 50 MCG: 0.05 INJECTION, SOLUTION INTRAMUSCULAR; INTRAVENOUS at 21:01

## 2021-01-06 RX ADMIN — HEPARIN SODIUM 2000 UNITS: 200 INJECTION, SOLUTION INTRAVENOUS at 21:35

## 2021-01-06 RX ADMIN — SODIUM CHLORIDE 5 MG/HR: 900 INJECTION, SOLUTION INTRAVENOUS at 14:23

## 2021-01-06 RX ADMIN — NIMODIPINE 60 MG: 30 CAPSULE, LIQUID FILLED ORAL at 16:49

## 2021-01-06 RX ADMIN — HEPARIN SODIUM 2000 UNITS: 200 INJECTION, SOLUTION INTRAVENOUS at 21:10

## 2021-01-06 RX ADMIN — ENOXAPARIN SODIUM 30 MG: 30 INJECTION SUBCUTANEOUS at 12:55

## 2021-01-06 RX ADMIN — HEPARIN SODIUM 2000 UNITS: 200 INJECTION, SOLUTION INTRAVENOUS at 21:20

## 2021-01-06 RX ADMIN — NIMODIPINE 60 MG: 30 CAPSULE, LIQUID FILLED ORAL at 03:21

## 2021-01-06 RX ADMIN — DOCUSATE SODIUM 50 MG AND SENNOSIDES 8.6 MG 2 TABLET: 8.6; 5 TABLET, FILM COATED ORAL at 23:25

## 2021-01-06 RX ADMIN — SODIUM CHLORIDE 5 MG/HR: 900 INJECTION, SOLUTION INTRAVENOUS at 20:04

## 2021-01-06 RX ADMIN — NIMODIPINE 60 MG: 30 CAPSULE, LIQUID FILLED ORAL at 23:25

## 2021-01-06 RX ADMIN — ATORVASTATIN CALCIUM 40 MG: 40 TABLET, FILM COATED ORAL at 23:26

## 2021-01-06 RX ADMIN — HEPARIN SODIUM 2000 UNITS: 200 INJECTION, SOLUTION INTRAVENOUS at 21:30

## 2021-01-06 RX ADMIN — LATANOPROST 1 DROP: 50 SOLUTION OPHTHALMIC at 23:50

## 2021-01-06 RX ADMIN — MIDAZOLAM 1 MG: 1 INJECTION INTRAMUSCULAR; INTRAVENOUS at 21:01

## 2021-01-06 RX ADMIN — NIMODIPINE 60 MG: 30 CAPSULE, LIQUID FILLED ORAL at 11:55

## 2021-01-06 RX ADMIN — SODIUM CHLORIDE 12.5 MG/HR: 900 INJECTION, SOLUTION INTRAVENOUS at 23:00

## 2021-01-06 RX ADMIN — SODIUM CHLORIDE 5 MG/HR: 900 INJECTION, SOLUTION INTRAVENOUS at 07:31

## 2021-01-06 RX ADMIN — LIDOCAINE HYDROCHLORIDE 4 MG: 20 INJECTION, SOLUTION INFILTRATION; PERINEURAL at 21:08

## 2021-01-06 RX ADMIN — HEPARIN SODIUM 2000 UNITS: 200 INJECTION, SOLUTION INTRAVENOUS at 21:15

## 2021-01-06 RX ADMIN — MAGNESIUM GLUCONATE 500 MG ORAL TABLET 400 MG: 500 TABLET ORAL at 11:55

## 2021-01-06 RX ADMIN — TAMSULOSIN HYDROCHLORIDE 0.4 MG: 0.4 CAPSULE ORAL at 23:25

## 2021-01-06 RX ADMIN — SODIUM CHLORIDE 25 ML/HR: 900 INJECTION, SOLUTION INTRAVENOUS at 21:00

## 2021-01-06 NOTE — PROGRESS NOTES
PT Note:  Per chart pt has plan for angiogram today and will hold evaluation until after procedure.   Thanks,  Michael Putnam, PT, DPT

## 2021-01-06 NOTE — PROGRESS NOTES
Bedside and Verbal shift change report given to Mu García rn (oncoming nurse) by Shivani Phillip (offgoing nurse). Report included the following information SBAR, Kardex, ED Summary, Procedure Summary, Intake/Output, MAR, Cardiac Rhythm NSR/ST, Alarm Parameters  and Dual Neuro Assessment.

## 2021-01-06 NOTE — PROGRESS NOTES
Occupational Therapy Note:    Per chart pt has plan for angiogram today and will hold evaluation until after procedure.     Jaci Mahoney, OTR/L, CLT

## 2021-01-06 NOTE — PROGRESS NOTES
Fabian Owens MD  Medical Director  [address] [facility], [address] [facility]  Tel: [phone]       Physical Medicine & Rehab Consult Progress Note      Patient: Rico Bernard  Admit Date: 1/5/2021  Admit Diagnosis: SAH (subarachnoid hemorrhage) ([facility] [address] [I60.9]    Recommendations:   Continue acute rehabilitation program, Coordination of rehab / medical care, Counseling of PM&R care issues management   -scheduled for Cerebral Angio today. Therapies pending. Exam non focal. Will f/u therapy evals . Lives independently, thus goal is mod I to independent. Excellent functional prognosis  History / Subjective / Complaint:     Patient seen and examined, interim EMR reviewed. Denies weakness or numbness. Complains of bifrontal headache, no blurred vision.    No Known Allergies  Current Facility-Administered Medications   Medication Dose Route Frequency    ondansetron (ZOFRAN) injection 4 mg  4 mg IntraVENous Q6H PRN    atorvastatin (LIPITOR) tablet 40 mg  40 mg Oral QHS    niMODipine (NIMOTOP) capsule 60 mg  60 mg Oral Q4H    acetaminophen (TYLENOL) tablet 650 mg  650 mg Oral Q4H PRN    senna-docusate (PERICOLACE) 8.6-50 mg per tablet 2 Tab  2 Tab Oral QHS    NUTRITIONAL SUPPORT ELECTROLYTE PRN ORDERS   Does Not Apply PRN    niCARdipine (CARDENE) 25 mg in 0.9% sodium chloride (MBP/ADV) 250 mL infusion  0-15 mg/hr IntraVENous TITRATE    magnesium sulfate 2 g/50 ml IVPB (premix or compounded)  2 g IntraVENous DAILY PRN    magnesium oxide (MAG-OX) tablet 400 mg  400 mg Oral BID    magnesium sulfate 4 g/100 mL IVPB  4 g IntraVENous DAILY PRN    ketorolac (TORADOL) injection 15 mg  15 mg IntraVENous Q6H PRN    fentaNYL citrate (PF) injection 50 mcg  50 mcg IntraVENous Q2H PRN    0.9% sodium chloride infusion  75 mL/hr IntraVENous CONTINUOUS       Objective:     Vitals:  Patient Vitals for the past 8 hrs:   BP Temp Pulse Resp SpO2   01/06/21 0945   99  96 % 01/06/21 0939 124/63  87  95 %   01/06/21 0923 117/61  91  94 %   01/06/21 0909 120/63  90  94 %   01/06/21 0854 116/60  87  95 %   01/06/21 0839 110/62  95  93 %   01/06/21 0823 114/65  100  94 %   01/06/21 0809 119/66  99  95 %   01/06/21 0800 120/67 98.9 °F (37.2 °C) 95 14 95 %   01/06/21 0754 115/61  93  96 %   01/06/21 0742 123/63  96  98 %   01/06/21 0739 (!) 148/71  (!) 103  100 %   01/06/21 0731 (!) 148/71  92     01/06/21 0724 (!) 148/71  96  97 %   01/06/21 0710 (!) 150/92  94  98 %   01/06/21 0700 (!) 150/92 98.7 °F (37.1 °C) 86 16 96 %   01/06/21 0624 115/61  90  97 %   01/06/21 0610 (!) 140/64  79  97 %   01/06/21 0600 133/63  86 16 98 %   01/06/21 0555 133/63  83  97 %   01/06/21 0540 109/67  81  97 %   01/06/21 0524 109/67  80  96 %   01/06/21 0510 105/64  86  96 %   01/06/21 0500 (!) 105/59  81 16 95 %   01/06/21 0454 (!) 105/59  81  94 %   01/06/21 0439 (!) 104/55  82  94 %   01/06/21 0424 118/62  86  95 %   01/06/21 0409 116/60  88  93 %   01/06/21 0400 126/61  91 16 95 %   01/06/21 0354 126/61  90  95 %   01/06/21 0339 (!) 143/60  96  96 %   01/06/21 0300 136/67 98.9 °F (37.2 °C) 79 16 98 %   01/06/21 0255 136/67  85  96 %   01/06/21 0240 (!) 119/58  88  96 %      Intake and Output:  01/04 1901 - 01/06 0700  In: 3068.3 [P.O.:100;  I.V.:2968.3]  Out: 885 [Urine:885]    Physical Exam:  Speech clear and cogn intact  Good attn and concentration  HEENT NCAT, PERRLA  No facial asymm  CV rrr no m  LUNGS cta b  ABD obese soft, ntnd bs +  EXT no edema, pulses 2+  NEURO; non focal. Nl motor strength x hip flexion 4+, sensation intact          Pain 1  Pain Scale 1: Numeric (0 - 10) (01/06/21 0300)  Pain Intensity 1: 0 (01/06/21 0300)  Patient Stated Pain Goal: 0 (01/06/21 0300)  Pain Onset 1: acute (01/05/21 1133)  Pain Location 1: Head (01/05/21 1133)  Pain Description 1: Aching (01/05/21 1133)     Functional Assessment: Labs/Studies:  Recent Results (from the past 72 hour(s))   CBC WITH AUTOMATED DIFF    Collection Time: 01/05/21 11:37 AM   Result Value Ref Range    WBC 5.1 4.3 - 11.1 K/uL    RBC 4.23 4.23 - 5.6 M/uL    HGB 13.4 (L) 13.6 - 17.2 g/dL    HCT 41.3 41.1 - 50.3 %    MCV 97.6 79.6 - 97.8 FL    MCH 31.7 26.1 - 32.9 PG    MCHC 32.4 31.4 - 35.0 g/dL    RDW 13.0 11.9 - 14.6 %    PLATELET 943 794 - 142 K/uL    MPV 10.3 9.4 - 12.3 FL    ABSOLUTE NRBC 0.00 0.0 - 0.2 K/uL    DF AUTOMATED      NEUTROPHILS 43 43 - 78 %    LYMPHOCYTES 38 13 - 44 %    MONOCYTES 8 4.0 - 12.0 %    EOSINOPHILS 10 (H) 0.5 - 7.8 %    BASOPHILS 1 0.0 - 2.0 %    IMMATURE GRANULOCYTES 0 0.0 - 5.0 %    ABS. NEUTROPHILS 2.2 1.7 - 8.2 K/UL    ABS. LYMPHOCYTES 2.0 0.5 - 4.6 K/UL    ABS. MONOCYTES 0.4 0.1 - 1.3 K/UL    ABS. EOSINOPHILS 0.5 0.0 - 0.8 K/UL    ABS. BASOPHILS 0.1 0.0 - 0.2 K/UL    ABS. IMM. GRANS. 0.0 0.0 - 0.5 K/UL   METABOLIC PANEL, COMPREHENSIVE    Collection Time: 01/05/21 11:37 AM   Result Value Ref Range    Sodium 139 138 - 145 mmol/L    Potassium 3.5 3.5 - 5.1 mmol/L    Chloride 108 (H) 98 - 107 mmol/L    CO2 27 21 - 32 mmol/L    Anion gap 4 (L) 7 - 16 mmol/L    Glucose 141 (H) 65 - 100 mg/dL    BUN 11 8 - 23 MG/DL    Creatinine 1.53 (H) 0.8 - 1.5 MG/DL    GFR est AA 56 (L) >60 ml/min/1.73m2    GFR est non-AA 47 (L) >60 ml/min/1.73m2    Calcium 9.1 8.3 - 10.4 MG/DL    Bilirubin, total 0.7 0.2 - 1.1 MG/DL    ALT (SGPT) 25 12 - 65 U/L    AST (SGOT) 21 15 - 37 U/L    Alk.  phosphatase 91 50 - 136 U/L    Protein, total 8.2 6.3 - 8.2 g/dL    Albumin 3.8 3.2 - 4.6 g/dL    Globulin 4.4 (H) 2.3 - 3.5 g/dL    A-G Ratio 0.9 (L) 1.2 - 3.5     TROPONIN-HIGH SENSITIVITY    Collection Time: 01/05/21 11:37 AM   Result Value Ref Range    Troponin-High Sensitivity 9.8 0 - 14 pg/mL   MAGNESIUM    Collection Time: 01/05/21 11:37 AM   Result Value Ref Range    Magnesium 2.2 1.8 - 2.4 mg/dL   PTT    Collection Time: 01/05/21 11:37 AM   Result Value Ref Range    aPTT 32.7 24.1 - 35.1 SEC   PROTHROMBIN TIME + INR    Collection Time: 01/05/21 11:37 AM   Result Value Ref Range    Prothrombin time 13.5 12.5 - 14.7 sec    INR 1.0     MISC. LAB TEST    Collection Time: 01/05/21 11:37 AM   Result Value Ref Range    Test Description: HA1C     Reference Lab: [email or web address]      Results: PENDING    EKG, 12 LEAD, INITIAL    Collection Time: 01/05/21 11:37 AM   Result Value Ref Range    Ventricular Rate 104 BPM    Atrial Rate 104 BPM    P-R Interval 156 ms    QRS Duration 80 ms    Q-T Interval 318 ms    QTC Calculation (Bezet) 418 ms    Calculated P Axis 84 degrees    Calculated R Axis 62 degrees    Calculated T Axis 59 degrees    Diagnosis       !! AGE AND GENDER SPECIFIC ECG ANALYSIS !!   Sinus tachycardia  Otherwise normal ECG  When compared with ECG of 05-JAN-2021 11:36,  Previous ECG has undetermined rhythm, needs review  Confirmed by Nyla Santos MD (), HOSEA GRACIA ([id number]) on 1/5/2021 3:53:20 PM     DRUG SCREEN, URINE    Collection Time: 01/05/21  1:38 PM   Result Value Ref Range    PCP(PHENCYCLIDINE) Negative      BENZODIAZEPINES Negative      COCAINE Negative      AMPHETAMINES Negative      METHADONE Negative      THC (TH-CANNABINOL) Negative      OPIATES Negative      BARBITURATES Negative     TROPONIN-HIGH SENSITIVITY    Collection Time: 01/05/21  5:55 PM   Result Value Ref Range    Troponin-High Sensitivity 18.5 (H) 0 - 14 pg/mL   CK    Collection Time: 01/05/21  5:55 PM   Result Value Ref Range     21 - 215 U/L   LIPID PANEL    Collection Time: 01/05/21  5:55 PM   Result Value Ref Range    LIPID PROFILE          Cholesterol, total 147 <200 MG/DL    Triglyceride 61 35 - 150 MG/DL    HDL Cholesterol 50 40 - 60 MG/DL    LDL, calculated 84.8 <100 MG/DL    VLDL, calculated 12.2 6.0 - 23.0 MG/DL    CHOL/HDL Ratio 2.9     MAGNESIUM    Collection Time: 01/05/21  5:55 PM   Result Value Ref Range    Magnesium 1.8 1.8 - 2.4 mg/dL   CBC WITH AUTOMATED DIFF    Collection Time: 01/06/21  3:19 AM   Result Value Ref Range    WBC 6.7 4.3 - 11.1 K/uL    RBC 3.59 (L) 4.23 - 5.6 M/uL    HGB 11.4 (L) 13.6 - 17.2 g/dL    HCT 35.3 (L) 41.1 - 50.3 %    MCV 98.3 (H) 79.6 - 97.8 FL    MCH 31.8 26.1 - 32.9 PG    MCHC 32.3 31.4 - 35.0 g/dL    RDW 12.8 11.9 - 14.6 %    PLATELET 009 758 - 380 K/uL    MPV 10.4 9.4 - 12.3 FL    ABSOLUTE NRBC 0.00 0.0 - 0.2 K/uL    DF AUTOMATED      NEUTROPHILS 54 43 - 78 %    LYMPHOCYTES 33 13 - 44 %    MONOCYTES 8 4.0 - 12.0 %    EOSINOPHILS 4 0.5 - 7.8 %    BASOPHILS 1 0.0 - 2.0 %    IMMATURE GRANULOCYTES 0 0.0 - 5.0 %    ABS. NEUTROPHILS 3.6 1.7 - 8.2 K/UL    ABS. LYMPHOCYTES 2.2 0.5 - 4.6 K/UL    ABS. MONOCYTES 0.6 0.1 - 1.3 K/UL    ABS. EOSINOPHILS 0.3 0.0 - 0.8 K/UL    ABS. BASOPHILS 0.1 0.0 - 0.2 K/UL    ABS. IMM.  GRANS. 0.0 0.0 - 0.5 K/UL   METABOLIC PANEL, BASIC    Collection Time: 01/06/21  3:19 AM   Result Value Ref Range    Sodium 140 138 - 145 mmol/L    Potassium 3.6 3.5 - 5.1 mmol/L    Chloride 110 (H) 98 - 107 mmol/L    CO2 25 21 - 32 mmol/L    Anion gap 5 (L) 7 - 16 mmol/L    Glucose 113 (H) 65 - 100 mg/dL    BUN 10 8 - 23 MG/DL    Creatinine 1.28 0.8 - 1.5 MG/DL    GFR est AA >60 >60 ml/min/1.73m2    GFR est non-AA 57 (L) >60 ml/min/1.73m2    Calcium 8.2 (L) 8.3 - 10.4 MG/DL   MAGNESIUM    Collection Time: 01/06/21  3:19 AM   Result Value Ref Range    Magnesium 2.8 (H) 1.8 - 2.4 mg/dL   TROPONIN-HIGH SENSITIVITY    Collection Time: 01/06/21  3:19 AM   Result Value Ref Range    Troponin-High Sensitivity 19.2 (H) 0 - 14 pg/mL        Assessment:     Active Problems:    SAH (subarachnoid hemorrhage) ([facility] [address] (1/5/2021)        Plan / Recommendations / Medical Decision Making:     Recommendations:   Continue acute rehabilitation program  Coordination of rehab / medical care  Counseling of PM&R care issues management  Monitoring and management of medical conditions per plan of care / orders    Discussion with Family / Caregiver / Staff    Reviewed Therapies / Labs / Medications / Records

## 2021-01-06 NOTE — PROGRESS NOTES
Bedside and Verbal shift change report given to See Chan RN (oncoming nurse) by Polly Suh RN (offgoing nurse). Report included the following information SBAR, Kardex, ED Summary, Procedure Summary, Intake/Output, MAR, Accordion, Recent Results, Med Rec Status, Cardiac Rhythm NSR, Alarm Parameters  and Dual Neuro Assessment.

## 2021-01-06 NOTE — PROGRESS NOTES
Progress Note    Patient: Koby Tellez MRN: [id number]  SSN: [id number]    YOB: 1940  Age: 79 y.o. Sex: male      Admit Date: 1/5/2021    LOS: 1 day     Subjective:   PBD: 2  Pt aox3 and in nad this am. No acute neuro changes. Current Facility-Administered Medications   Medication Dose Route Frequency    ondansetron (ZOFRAN) injection 4 mg  4 mg IntraVENous Q6H PRN    atorvastatin (LIPITOR) tablet 40 mg  40 mg Oral QHS    niMODipine (NIMOTOP) capsule 60 mg  60 mg Oral Q4H    acetaminophen (TYLENOL) tablet 650 mg  650 mg Oral Q4H PRN    senna-docusate (PERICOLACE) 8.6-50 mg per tablet 2 Tab  2 Tab Oral QHS    NUTRITIONAL SUPPORT ELECTROLYTE PRN ORDERS   Does Not Apply PRN    niCARdipine (CARDENE) 25 mg in 0.9% sodium chloride (MBP/ADV) 250 mL infusion  0-15 mg/hr IntraVENous TITRATE    magnesium sulfate 2 g/50 ml IVPB (premix or compounded)  2 g IntraVENous DAILY PRN    magnesium oxide (MAG-OX) tablet 400 mg  400 mg Oral BID    magnesium sulfate 4 g/100 mL IVPB  4 g IntraVENous DAILY PRN    ketorolac (TORADOL) injection 15 mg  15 mg IntraVENous Q6H PRN    fentaNYL citrate (PF) injection 50 mcg  50 mcg IntraVENous Q2H PRN    0.9% sodium chloride infusion  75 mL/hr IntraVENous CONTINUOUS       Objective:     Vitals:    01/06/21 0909 01/06/21 0923 01/06/21 0939 01/06/21 0945   BP: 120/63 117/61 124/63    Pulse: 90 91 87 99   Resp:       Temp:       SpO2: 94% 94% 95% 96%   Weight:       Height:             Intake and Output:  Current Shift: No intake/output data recorded. Last 24 hr: 01/05 0701 - 01/06 0700  In: 3068.3 [P.O.:100; I.V.:2968.3]  Out: 885 [Urine:885]         Physical Exam:   General:  Alert, cooperative, no distress   Eyes:  PERRL+3, midline   Neck: Supple, symmetrical, trachea midline, no adenopathy, thyroid: no enlargment/tenderness/nodules, no carotid bruit and no JVD. Lungs:   Clear to auscultation bilaterally.    Heart:  Regular rate and rhythm, S1, S2 normal, no murmur, click, rub or gallop. Abdomen:   Soft, non-tender. Bowel sounds normal. No masses,  No organomegaly. Extremities: Extremities normal, atraumatic, no cyanosis or edema. Pulses: 2+ and symmetric all extremities. Skin: Skin color, texture, turgor normal. No rashes or lesions   Neurologic: CNII-XII intact. Normal strength, sensation and reflexes throughout. Lab/Data Review:    Recent Results (from the past 12 hour(s))   CBC WITH AUTOMATED DIFF    Collection Time: 01/06/21  3:19 AM   Result Value Ref Range    WBC 6.7 4.3 - 11.1 K/uL    RBC 3.59 (L) 4.23 - 5.6 M/uL    HGB 11.4 (L) 13.6 - 17.2 g/dL    HCT 35.3 (L) 41.1 - 50.3 %    MCV 98.3 (H) 79.6 - 97.8 FL    MCH 31.8 26.1 - 32.9 PG    MCHC 32.3 31.4 - 35.0 g/dL    RDW 12.8 11.9 - 14.6 %    PLATELET 897 220 - 389 K/uL    MPV 10.4 9.4 - 12.3 FL    ABSOLUTE NRBC 0.00 0.0 - 0.2 K/uL    DF AUTOMATED      NEUTROPHILS 54 43 - 78 %    LYMPHOCYTES 33 13 - 44 %    MONOCYTES 8 4.0 - 12.0 %    EOSINOPHILS 4 0.5 - 7.8 %    BASOPHILS 1 0.0 - 2.0 %    IMMATURE GRANULOCYTES 0 0.0 - 5.0 %    ABS. NEUTROPHILS 3.6 1.7 - 8.2 K/UL    ABS. LYMPHOCYTES 2.2 0.5 - 4.6 K/UL    ABS. MONOCYTES 0.6 0.1 - 1.3 K/UL    ABS. EOSINOPHILS 0.3 0.0 - 0.8 K/UL    ABS. BASOPHILS 0.1 0.0 - 0.2 K/UL    ABS. IMM.  GRANS. 0.0 0.0 - 0.5 K/UL   METABOLIC PANEL, BASIC    Collection Time: 01/06/21  3:19 AM   Result Value Ref Range    Sodium 140 138 - 145 mmol/L    Potassium 3.6 3.5 - 5.1 mmol/L    Chloride 110 (H) 98 - 107 mmol/L    CO2 25 21 - 32 mmol/L    Anion gap 5 (L) 7 - 16 mmol/L    Glucose 113 (H) 65 - 100 mg/dL    BUN 10 8 - 23 MG/DL    Creatinine 1.28 0.8 - 1.5 MG/DL    GFR est AA >60 >60 ml/min/1.73m2    GFR est non-AA 57 (L) >60 ml/min/1.73m2    Calcium 8.2 (L) 8.3 - 10.4 MG/DL   MAGNESIUM    Collection Time: 01/06/21  3:19 AM   Result Value Ref Range    Magnesium 2.8 (H) 1.8 - 2.4 mg/dL   TROPONIN-HIGH SENSITIVITY    Collection Time: 01/06/21  3:19 AM   Result Value Ref Range    Troponin-High Sensitivity 19.2 (H) 0 - 14 pg/mL       Assessment/ Plan: Active Problems:    SAH (subarachnoid hemorrhage) ([address] (1/5/2021)        NEURO: SAH and in ICU under [facility] protocol. Will do cerebral angiogram today. Daughter, Wilmer Schwartz, has been updated and is NOK. Consult PT/OT/ST/Rehab as warranted. TCD start today. Consent for angio obtained with Dr. Joseph Vogt this am.   RESP:RA and tolerating well. CV:MAP 70-90. cardene prn, trop mildly elevated, will trend . SCD/lovenox. HEME: HH: 11/35   NEPH:bun/cr: 10/1.28 ( last 1.3 in Jan/20). GI:NPO for angiogram today. aparna Pierre.    ID:afebrile, no abx  LINES:IV, PICC, ramirez  Signed By: Sy De Guzman NP     January 6, 2021

## 2021-01-06 NOTE — PROGRESS NOTES
SPEECH PATHOLOGY NOTE:    Patient NPO for angiogram today. Will follow up once patient is cleared to participate in dysphagia evaluation.      Carisa [address] CCC-SLP

## 2021-01-06 NOTE — PROGRESS NOTES
Initial visit made to patient and a prayer was provided. A  card was left. He was not alert.         Maricela Harada, [address] [facility], [facility]

## 2021-01-07 ENCOUNTER — APPOINTMENT (OUTPATIENT)
Dept: MRI IMAGING | Age: 81
DRG: 066 | End: 2021-01-07
Attending: NURSE PRACTITIONER
Payer: MEDICARE

## 2021-01-07 LAB
ANION GAP SERPL CALC-SCNC: 5 MMOL/L (ref 7–16)
BASOPHILS # BLD: 0 K/UL (ref 0–0.2)
BASOPHILS NFR BLD: 1 % (ref 0–2)
BUN SERPL-MCNC: 11 MG/DL (ref 8–23)
CALCIUM SERPL-MCNC: 7.4 MG/DL (ref 8.3–10.4)
CHLORIDE SERPL-SCNC: 112 MMOL/L (ref 98–107)
CO2 SERPL-SCNC: 23 MMOL/L (ref 21–32)
CREAT SERPL-MCNC: 1.22 MG/DL (ref 0.8–1.5)
DIFFERENTIAL METHOD BLD: ABNORMAL
EOSINOPHIL # BLD: 0.1 K/UL (ref 0–0.8)
EOSINOPHIL NFR BLD: 2 % (ref 0.5–7.8)
ERYTHROCYTE [DISTWIDTH] IN BLOOD BY AUTOMATED COUNT: 12.8 % (ref 11.9–14.6)
GLUCOSE SERPL-MCNC: 106 MG/DL (ref 65–100)
HCT VFR BLD AUTO: 33.6 % (ref 41.1–50.3)
HGB BLD-MCNC: 11 G/DL (ref 13.6–17.2)
IMM GRANULOCYTES # BLD AUTO: 0 K/UL (ref 0–0.5)
IMM GRANULOCYTES NFR BLD AUTO: 0 % (ref 0–5)
LYMPHOCYTES # BLD: 1.8 K/UL (ref 0.5–4.6)
LYMPHOCYTES NFR BLD: 32 % (ref 13–44)
Lab: NORMAL
MAGNESIUM SERPL-MCNC: 2.3 MG/DL (ref 1.8–2.4)
MCH RBC QN AUTO: 32 PG (ref 26.1–32.9)
MCHC RBC AUTO-ENTMCNC: 32.7 G/DL (ref 31.4–35)
MCV RBC AUTO: 97.7 FL (ref 79.6–97.8)
MONOCYTES # BLD: 0.4 K/UL (ref 0.1–1.3)
MONOCYTES NFR BLD: 8 % (ref 4–12)
NEUTS SEG # BLD: 3.2 K/UL (ref 1.7–8.2)
NEUTS SEG NFR BLD: 57 % (ref 43–78)
NRBC # BLD: 0 K/UL (ref 0–0.2)
PLATELET # BLD AUTO: 160 K/UL (ref 150–450)
PMV BLD AUTO: 10.3 FL (ref 9.4–12.3)
POTASSIUM SERPL-SCNC: 3.4 MMOL/L (ref 3.5–5.1)
RBC # BLD AUTO: 3.44 M/UL (ref 4.23–5.6)
REFERENCE LAB,REFLB: NORMAL
SODIUM SERPL-SCNC: 140 MMOL/L (ref 138–145)
TEST DESCRIPTION:,ATST: NORMAL
TROPONIN-HIGH SENSITIVITY: 17.5 PG/ML (ref 0–14)
WBC # BLD AUTO: 5.5 K/UL (ref 4.3–11.1)

## 2021-01-07 PROCEDURE — 74011250636 HC RX REV CODE- 250/636: Performed by: NURSE PRACTITIONER

## 2021-01-07 PROCEDURE — 65610000006 HC RM INTENSIVE CARE

## 2021-01-07 PROCEDURE — 97112 NEUROMUSCULAR REEDUCATION: CPT

## 2021-01-07 PROCEDURE — 74011000258 HC RX REV CODE- 258: Performed by: NURSE PRACTITIONER

## 2021-01-07 PROCEDURE — C1894 INTRO/SHEATH, NON-LASER: HCPCS

## 2021-01-07 PROCEDURE — C1887 CATHETER, GUIDING: HCPCS

## 2021-01-07 PROCEDURE — 2709999900 HC NON-CHARGEABLE SUPPLY

## 2021-01-07 PROCEDURE — 84484 ASSAY OF TROPONIN QUANT: CPT

## 2021-01-07 PROCEDURE — 72141 MRI NECK SPINE W/O DYE: CPT

## 2021-01-07 PROCEDURE — 77030040829 HC CATH EXT URINE MDII -B

## 2021-01-07 PROCEDURE — 93886 INTRACRANIAL COMPLETE STUDY: CPT

## 2021-01-07 PROCEDURE — 97165 OT EVAL LOW COMPLEX 30 MIN: CPT

## 2021-01-07 PROCEDURE — 97161 PT EVAL LOW COMPLEX 20 MIN: CPT

## 2021-01-07 PROCEDURE — 93886 INTRACRANIAL COMPLETE STUDY: CPT | Performed by: PSYCHIATRY & NEUROLOGY

## 2021-01-07 PROCEDURE — 99232 SBSQ HOSP IP/OBS MODERATE 35: CPT | Performed by: NEUROLOGICAL SURGERY

## 2021-01-07 PROCEDURE — 99231 SBSQ HOSP IP/OBS SF/LOW 25: CPT | Performed by: PHYSICIAN ASSISTANT

## 2021-01-07 PROCEDURE — 80048 BASIC METABOLIC PNL TOTAL CA: CPT

## 2021-01-07 PROCEDURE — C1769 GUIDE WIRE: HCPCS

## 2021-01-07 PROCEDURE — 83735 ASSAY OF MAGNESIUM: CPT

## 2021-01-07 PROCEDURE — 74011250637 HC RX REV CODE- 250/637: Performed by: NURSE PRACTITIONER

## 2021-01-07 PROCEDURE — 85025 COMPLETE CBC W/AUTO DIFF WBC: CPT

## 2021-01-07 PROCEDURE — 97530 THERAPEUTIC ACTIVITIES: CPT

## 2021-01-07 PROCEDURE — 92523 SPEECH SOUND LANG COMPREHEN: CPT

## 2021-01-07 PROCEDURE — 92610 EVALUATE SWALLOWING FUNCTION: CPT

## 2021-01-07 PROCEDURE — 74011250637 HC RX REV CODE- 250/637: Performed by: NEUROLOGICAL SURGERY

## 2021-01-07 PROCEDURE — 77030003629 HC NDL PERC VASC COOK -A

## 2021-01-07 PROCEDURE — 97535 SELF CARE MNGMENT TRAINING: CPT

## 2021-01-07 RX ORDER — POTASSIUM CHLORIDE 14.9 MG/ML
20 INJECTION INTRAVENOUS ONCE
Status: COMPLETED | OUTPATIENT
Start: 2021-01-07 | End: 2021-01-07

## 2021-01-07 RX ORDER — PHENAZOPYRIDINE HYDROCHLORIDE 95 MG/1
95 TABLET ORAL 3 TIMES DAILY
Status: COMPLETED | OUTPATIENT
Start: 2021-01-07 | End: 2021-01-10

## 2021-01-07 RX ADMIN — CEFTRIAXONE SODIUM 1 G: 1 INJECTION, POWDER, FOR SOLUTION INTRAMUSCULAR; INTRAVENOUS at 14:29

## 2021-01-07 RX ADMIN — NIMODIPINE 60 MG: 30 CAPSULE, LIQUID FILLED ORAL at 13:45

## 2021-01-07 RX ADMIN — POTASSIUM CHLORIDE 20 MEQ: 14.9 INJECTION, SOLUTION INTRAVENOUS at 06:23

## 2021-01-07 RX ADMIN — ATORVASTATIN CALCIUM 40 MG: 40 TABLET, FILM COATED ORAL at 21:19

## 2021-01-07 RX ADMIN — FAMOTIDINE 20 MG: 20 TABLET, FILM COATED ORAL at 08:23

## 2021-01-07 RX ADMIN — NIMODIPINE 60 MG: 30 CAPSULE, LIQUID FILLED ORAL at 08:23

## 2021-01-07 RX ADMIN — NIMODIPINE 60 MG: 30 CAPSULE, LIQUID FILLED ORAL at 04:32

## 2021-01-07 RX ADMIN — DOCUSATE SODIUM 50 MG AND SENNOSIDES 8.6 MG 2 TABLET: 8.6; 5 TABLET, FILM COATED ORAL at 21:19

## 2021-01-07 RX ADMIN — URINARY PAIN RELIEF 95 MG: 95 TABLET ORAL at 21:19

## 2021-01-07 RX ADMIN — VALSARTAN: 80 TABLET ORAL at 08:23

## 2021-01-07 RX ADMIN — ENOXAPARIN SODIUM 30 MG: 30 INJECTION SUBCUTANEOUS at 14:29

## 2021-01-07 RX ADMIN — NIMODIPINE 60 MG: 30 CAPSULE, LIQUID FILLED ORAL at 16:55

## 2021-01-07 RX ADMIN — KETOROLAC TROMETHAMINE 15 MG: 30 INJECTION, SOLUTION INTRAMUSCULAR; INTRAVENOUS at 20:36

## 2021-01-07 RX ADMIN — URINARY PAIN RELIEF 95 MG: 95 TABLET ORAL at 16:55

## 2021-01-07 RX ADMIN — MAGNESIUM GLUCONATE 500 MG ORAL TABLET 400 MG: 500 TABLET ORAL at 17:26

## 2021-01-07 RX ADMIN — LATANOPROST 1 DROP: 50 SOLUTION OPHTHALMIC at 22:00

## 2021-01-07 RX ADMIN — MAGNESIUM SULFATE HEPTAHYDRATE 2 G: 40 INJECTION, SOLUTION INTRAVENOUS at 06:23

## 2021-01-07 RX ADMIN — MAGNESIUM GLUCONATE 500 MG ORAL TABLET 400 MG: 500 TABLET ORAL at 08:23

## 2021-01-07 RX ADMIN — NIMODIPINE 60 MG: 30 CAPSULE, LIQUID FILLED ORAL at 20:36

## 2021-01-07 NOTE — PROGRESS NOTES
Progress Note    Patient: Shivani Sparrow MRN: [id number]  SSN: [id number]    YOB: 1940  Age: 79 y.o. Sex: male      Admit Date: 1/5/2021    LOS: 2 days     Subjective:   PBD: 3  Pt OOB to chair this am, looks good. aox3 and in nad  R groin CDI with pulses intact. Cerebral angiogram neg 1-6-21 for any obvious vascular source of SAH. Will get MRI C-spine ro any cervical fistula.    Repeat cerebral angiogram 1-12-21    Current Facility-Administered Medications   Medication Dose Route Frequency    enoxaparin (LOVENOX) injection 30 mg  30 mg SubCUTAneous Q24H    famotidine (PEPCID) tablet 20 mg  20 mg Oral DAILY    latanoprost (XALATAN) 0.005 % ophthalmic solution 1 Drop  1 Drop Both Eyes QHS    tamsulosin (FLOMAX) capsule 0.4 mg  0.4 mg Oral DAILY    valsartan/hydroCHLOROthiazide (DIOVAN HCT)  80/12.5 mg   Oral DAILY    ondansetron (ZOFRAN) injection 4 mg  4 mg IntraVENous Q6H PRN    atorvastatin (LIPITOR) tablet 40 mg  40 mg Oral QHS    niMODipine (NIMOTOP) capsule 60 mg  60 mg Oral Q4H    acetaminophen (TYLENOL) tablet 650 mg  650 mg Oral Q4H PRN    senna-docusate (PERICOLACE) 8.6-50 mg per tablet 2 Tab  2 Tab Oral QHS    NUTRITIONAL SUPPORT ELECTROLYTE PRN ORDERS   Does Not Apply PRN    niCARdipine (CARDENE) 25 mg in 0.9% sodium chloride (MBP/ADV) 250 mL infusion  0-15 mg/hr IntraVENous TITRATE    magnesium sulfate 2 g/50 ml IVPB (premix or compounded)  2 g IntraVENous DAILY PRN    magnesium oxide (MAG-OX) tablet 400 mg  400 mg Oral BID    magnesium sulfate 4 g/100 mL IVPB  4 g IntraVENous DAILY PRN    ketorolac (TORADOL) injection 15 mg  15 mg IntraVENous Q6H PRN    fentaNYL citrate (PF) injection 50 mcg  50 mcg IntraVENous Q2H PRN    0.9% sodium chloride infusion  75 mL/hr IntraVENous CONTINUOUS       Objective:     Vitals:    01/07/21 0800 01/07/21 0816 01/07/21 0830 01/07/21 0845   BP: 106/68 109/71 (!) 104/59 119/60   Pulse: 95 100 96 94   Resp: 15      Temp: 98.7 °F (37.1 °C)      SpO2: 97% 95% 97% 96%   Weight:       Height:             Intake and Output:  Current Shift: No intake/output data recorded. Last 24 hr: 01/06 0701 - 01/07 0700  In: -   Out: 1440 [Urine:1440]         Physical Exam:   General:  Alert, cooperative, no distress   Eyes:  PERRL+3, midline   Neck: Supple, symmetrical, trachea midline, no adenopathy, thyroid: no enlargment/tenderness/nodules, no carotid bruit and no JVD. Lungs:   Clear to auscultation bilaterally. Heart:  Regular rate and rhythm, S1, S2 normal, no murmur, click, rub or gallop. Abdomen:   Soft, non-tender. Bowel sounds normal. No masses,  No organomegaly. Complains of burning with urination. Extremities: Extremities normal, atraumatic, no cyanosis or edema. Pulses: 2+ and symmetric all extremities. Skin: Skin color, texture, turgor normal. No rashes or lesions   Neurologic: CNII-XII intact. Normal strength, sensation and reflexes throughout. Lab/Data Review:    Recent Results (from the past 12 hour(s))   CBC WITH AUTOMATED DIFF    Collection Time: 01/07/21  3:39 AM   Result Value Ref Range    WBC 5.5 4.3 - 11.1 K/uL    RBC 3.44 (L) 4.23 - 5.6 M/uL    HGB 11.0 (L) 13.6 - 17.2 g/dL    HCT 33.6 (L) 41.1 - 50.3 %    MCV 97.7 79.6 - 97.8 FL    MCH 32.0 26.1 - 32.9 PG    MCHC 32.7 31.4 - 35.0 g/dL    RDW 12.8 11.9 - 14.6 %    PLATELET 204 523 - 296 K/uL    MPV 10.3 9.4 - 12.3 FL    ABSOLUTE NRBC 0.00 0.0 - 0.2 K/uL    DF AUTOMATED      NEUTROPHILS 57 43 - 78 %    LYMPHOCYTES 32 13 - 44 %    MONOCYTES 8 4.0 - 12.0 %    EOSINOPHILS 2 0.5 - 7.8 %    BASOPHILS 1 0.0 - 2.0 %    IMMATURE GRANULOCYTES 0 0.0 - 5.0 %    ABS. NEUTROPHILS 3.2 1.7 - 8.2 K/UL    ABS. LYMPHOCYTES 1.8 0.5 - 4.6 K/UL    ABS. MONOCYTES 0.4 0.1 - 1.3 K/UL    ABS. EOSINOPHILS 0.1 0.0 - 0.8 K/UL    ABS. BASOPHILS 0.0 0.0 - 0.2 K/UL    ABS. IMM.  GRANS. 0.0 0.0 - 0.5 K/UL   METABOLIC PANEL, BASIC    Collection Time: 01/07/21  3:39 AM   Result Value Ref Range Sodium 140 138 - 145 mmol/L    Potassium 3.4 (L) 3.5 - 5.1 mmol/L    Chloride 112 (H) 98 - 107 mmol/L    CO2 23 21 - 32 mmol/L    Anion gap 5 (L) 7 - 16 mmol/L    Glucose 106 (H) 65 - 100 mg/dL    BUN 11 8 - 23 MG/DL    Creatinine 1.22 0.8 - 1.5 MG/DL    GFR est AA >60 >60 ml/min/1.73m2    GFR est non-AA >60 >60 ml/min/1.73m2    Calcium 7.4 (L) 8.3 - 10.4 MG/DL   MAGNESIUM    Collection Time: 01/07/21  3:39 AM   Result Value Ref Range    Magnesium 2.3 1.8 - 2.4 mg/dL       Assessment/ Plan:     Principal Problem:    SAH (subarachnoid hemorrhage) ([facility] [address] (1/5/2021)        NEURO: SAH and in ICU under [address] protocol. Will do cerebral angiogram today. Daughter, Aziza Spain, has been updated and is NOK. Consult PT/OT/ST/Rehab as warranted. Angio neg 1-6-21. Will repeat next week. TCD normal today. Will update family on plan of care  RESP:RA and tolerating well. CV:MAP 70-90. cardene prn, trop mildly elevated, will trend . SCD/lovenox. HEME: HH: 11/33   NEPH:bun/cr: 11/1. 2. pt with hx of UTI, states burning with urination. Afebrile. Spasms. Will start empiric rocephin x5days and pyridium for 3 days. Monitor. GI:Reg diet. Pepcid, senna. ID:[id number]. will start rocephin and pyridium for empiric treatment dysuria. Barrington ramirez.    LINES:IV, PICC,   Signed By: Rohini Ram NP     January 7, 2021

## 2021-01-07 NOTE — OP NOTES
Procedure: Cerebral angiogram  Surgeon: Dr. Leonie Demarco  Pre-op Dx: [facility]  Post-op Dx: same  Anesthesia: Conscious sedation with fentanyl and versed  Complications: None  EBL: 10cc  Specimens: None  Findings: No vascular abnormality noted.

## 2021-01-07 NOTE — PROGRESS NOTES
Pt arrived to [address] [place] [address]. room for cerebral angiogram per Dr. Royce Messina

## 2021-01-07 NOTE — PROGRESS NOTES
Elidia Martinez MD  Medical Director  [address] [facility], [address] [facility]  Tel: [phone]       Physical Medicine & Rehab Consult     Admit Date: 1/5/2021  Referring Provider: Neuroendovascular Surgery service / Bassam Hernandez NP    Medical Decision Making / Plan / Recommend: Medical chart reviewed. Bossman Rocha is a 79 y.o. Black male whom we have been following for [facility] appropriateness s/p subarachnoid and subdural hemorrhage. EMR, interim notes reviewed with Ruth  Director since initial consult 1/5/2021. Patient has undergone cerebral angiogram with no obvious vascular abnormality findings. Physical and occupational therapy evaluations noted. Patient requires minimal assistance for bed mobility, sit to stand and transfers, ambulating 250' with rolling walker, gait belt and contact-guard assistance. He requires standby assistance for self-grooming, minimal assistance for lower-body dressing, and toileting hygiene was completed with supervision. At this high level of function and activity, patient does not require 3 hours daily of intense therapy offered at [facility]. He may benefit from home health therapy or outpatient therapy after hospital discharge, but we defer to PT / OT recommendations. Please notify us of any changes; otherwise, we will sign off. Jennifer Ruiz PA-C  Physician Assistant with [facility]  Luciana Bertrand MD, Medical Director

## 2021-01-07 NOTE — PROGRESS NOTES
ACUTE PHYSICAL THERAPY GOALS:  (Developed with and agreed upon by patient and/or caregiver. )  LTG:  (1.)Mr. Maria Luz Schwartz will move from supine to sit and sit to supine , scoot up and down and roll side to side in bed with INDEPENDENT within 7 treatment day(s). (2.)Mr. Maria Luz Schwartz will transfer from bed to chair and chair to bed with MODIFIED INDEPENDENCE using the least restrictive device within 7 treatment day(s). (3.)Mr. Maria Luz Schwartz will ambulate with MODIFIED INDEPENDENCE for 500 feet with the least restrictive device within 7 treatment day(s). (4.)Mr. Maria Luz Schwartz will participate in therapeutic activity/exercises x 25 minutes for increased strength within 7 treatment days. (5.)Mr. Maria Luz Schwartz will ascend and descend 2 stairs using 0 hand rail(s) with SUPERVISION to improve functional mobility and safety within 7 day(s).     ________________________________________________________________________________________________      PHYSICAL THERAPY ASSESSMENT: Initial Assessment and AM PT Treatment Day # 1      Koby Tellez is a 79 y.o. male   PRIMARY DIAGNOSIS: SAH (subarachnoid hemorrhage) ([facility]  SAH (subarachnoid hemorrhage) ([facility] [address] [facility] [I60.9]       Reason for Referral:    ICD-10: Treatment Diagnosis: Generalized Muscle Weakness (M62.81)  [place] / [place] / Product Type: Medicare /     ASSESSMENT:     REHAB RECOMMENDATIONS:   Recommendation to date pending progress:  Setting:   Outpatient Therapy  Equipment:    To Be Determined     PRIOR LEVEL OF FUNCTION:  (Prior to Hospitalization) INITIAL/CURRENT LEVEL OF FUNCTION:  (Most Recently Demonstrated)   Bed Mobility:   Independent  Sit to Stand:   Independent  Transfers:   Independent  Gait/Mobility:   Independent Bed Mobility:   Minimal Assistance  Sit to Stand:   Minimal Assistance  Transfers:   Minimal Assistance  Gait/Mobility:   Contact Guard Assistance     ASSESSMENT:  Mr. Maria Luz Schwartz presents to PT with [facility] AROM and decreased strength in B LEs. Pt has MAP parameters today 70-90. He present with decreased activity tolerance and some balance deficits as well. Pt performed transfers with Nereida today and ambulated using RW/CGA. OT present for assistance with aDLs while PT addressed ambulation/transfers. Mr. Griselda Cotto could benefit from skilled PT as he is currently functioning below his baseline. SUBJECTIVE:   Mr. Griselda Cotto states, [place] for laundry. \"    SOCIAL HISTORY/LIVING ENVIRONMENT:   Home Environment: Private residence  # Steps to Enter: 2  One/Two Story Residence: Two story, live on 1st floor  Living Alone: Yes  Support Systems: Child(sonu)  OBJECTIVE:     PAIN: VITAL SIGNS: LINES/DRAINS:   Pre Treatment: Pain Screen  Pain Scale 1: Numeric (0 - 10)  Pain Intensity 1: 0  Post Treatment: 0   Garza Catheter and IV  O2 Device: Room air     GROSS EVALUATION:  B LEs Within Functional Limits Abnormal/ Functional Abnormal/ Non-Functional (see comments) Not Tested Comments:   AROM [x] [] [] []    PROM [] [] [] []    Strength [] [x] [] []    Balance [] [x] [] []    Posture [] [] [] []    Sensation [x] [] [] []    Coordination [] [x] [] []    Tone [] [] [] []    Edema [] [] [] []    Activity Tolerance [] [] [x] [] Appeared SOB with exertion but SpO2>90%    [] [] [] []      COGNITION/  PERCEPTION: Intact Impaired   (see comments) Comments:   Orientation [x] []    Vision [] []    Hearing [] []    Command Following [x] []    Safety Awareness [] [x] Some impulsivity    [] []      MOBILITY: I Mod I S SBA CGA Min Mod Max Total  NT x2 Comments:   Bed Mobility    Rolling [] [] [] [x] [] [] [] [] [] [] []    Supine to Sit [] [] [] [] [] [x] [] [] [] [] []    Scooting [] [] [] [x] [] [] [] [] [] [] []    Sit to Supine [] [] [] [] [] [] [] [] [] [x] []    Transfers    Sit to Stand [] [] [] [] [] [x] [] [] [] [] []    Bed to Chair [] [] [] [] [] [x] [] [] [] [] []    Stand to Sit [] [] [] [] [] [x] [] [] [] [] [] I=Independent, Mod I=Modified Independent, S=Supervision, SBA=Standby Assistance, CGA=Contact Guard Assistance,   Min=Minimal Assistance, Mod=Moderate Assistance, Max=Maximal Assistance, Total=Total Assistance, NT=Not Tested  GAIT: I Mod I S SBA CGA Min Mod Max Total  NT x2 Comments:   Level of Assistance [] [] [] [] [x] [] [] [] [] [] []    Distance 250 ft    DME Rolling Walker and Gait Belt    Gait Quality Decreased B step clearance and narrowed COURTNEY    I=Independent, Mod I=Modified Independent, S=Supervision, SBA=Standby Assistance, CGA=Contact Guard Assistance,   Min=Minimal Assistance, Mod=Moderate Assistance, Max=Maximal Assistance, Total=Total Assistance, NT=Not Tested    [place] Form       How much difficulty does the patient currently have. .. Unable A Lot A Little None   1. Turning over in bed (including adjusting bedclothes, sheets and blankets)? [] 1   [] 2   [] 3   [x] 4   2. Sitting down on and standing up from a chair with arms ( e.g., wheelchair, bedside commode, etc.)   [] 1   [] 2   [x] 3   [] 4   3. Moving from lying on back to sitting on the side of the bed? [] 1   [] 2   [x] 3   [] 4   How much help from another person does the patient currently need. .. Total A Lot A Little None   4. Moving to and from a bed to a chair (including a wheelchair)? [] 1   [] 2   [x] 3   [] 4   5. Need to walk in hospital room? [] 1   [] 2   [x] 3   [] 4   6. Climbing 3-5 steps with a railing? [] 1   [] 2   [x] 3   [] 4   © 2007, Trustees of [facility] [employer], under license to [employer]. All rights reserved     Score:  Initial: 19 Most Recent: X (Date: -- )    Interpretation of Tool:  Represents activities that are increasingly more difficult (i.e. Bed mobility, Transfers, Gait).     PLAN:   FREQUENCY/DURATION: PT Plan of Care: 3 times/week for duration of hospital stay or until stated goals are met, whichever comes first.    PROBLEM LIST:   (Skilled intervention is medically necessary to address:)  1. Decreased ADL/Functional Activities  2. Decreased Activity Tolerance  3. Decreased Balance  4. Decreased Coordination  5. Decreased Gait Ability  6. Decreased Strength  7. Decreased Transfer Abilities   INTERVENTIONS PLANNED:   (Benefits and precautions of physical therapy have been discussed with the patient.)  1. Therapeutic Activity  2. Therapeutic Exercise/HEP  3. Neuromuscular Re-education  4. Gait Training  5. Manual Therapy  6. Education     TREATMENT:     EVALUATION: Low Complexity : (Untimed Charge)    TREATMENT:   ($$ Therapeutic Activity: 38-52 mins    )  Therapeutic Activity (38 Minutes): Therapeutic activity included Rolling, Supine to Sit, Scooting, Transfer Training, Ambulation on level ground, Sitting balance  and Standing balance to improve functional Mobility, Strength and Activity tolerance.     AFTER TREATMENT POSITION/PRECAUTIONS:  Alarm Activated, Chair, Needs within reach and RN notified    INTERDISCIPLINARY COLLABORATION:  RN/PCT, PT/PTA and OT/RG    TOTAL TREATMENT DURATION:  PT Patient Time In/Time Out  Time In: [address]  Time Out: [address], PT, DPT

## 2021-01-07 NOTE — PROGRESS NOTES
A follow up visit was made to the patient. Emotional support, spiritual presence and   prayer were provided for the patient. He was receiving assistance from the physical therapists.       Deb Burrell, [facility], [facility]

## 2021-01-07 NOTE — PROGRESS NOTES
SPEECH LANGUAGE PATHOLOGY: DYSPHAGIA AND SPEECH-LANGUAGE/COGNITION: Initial Assessment and Discharge    NAME/AGE/GENDER: Edda Almonte is a 79 y.o. male  DATE: 1/7/2021  PRIMARY DIAGNOSIS: [facility] [I60.9]      ICD-10: Treatment Diagnosis: R13.12 Dysphagia, Oropharyngeal Phase    RECOMMENDATIONS   DIET:    PO:  Regular   Liquids:  regular thin    MEDICATIONS: With liquid     ASPIRATION PRECAUTIONS  · Slow rate of intake  · Small bites/sips  · Upright at 90 degrees during meal     COMPENSATORY STRATEGIES/MODIFICATIONS  · None     EDUCATION:  · Recommendations discussed with Nursing  · Patient     RECOMMENDATIONS for CONTINUED SPEECH THERAPY: No further speech therapy indicated at this time. ASSESSMENT   Patient presents with swallow function that is within normal limits. No dysphagia identified    Recommend regular diet/thin liquids. Medications whole with liquid wash. No dysphagia needs identified. Initial cognitive-linguistic evaluation also completed. No acute needs identified. Speech to sign off. Please consider re-consult if new concerns arise. REHABILITATION POTENTIAL FOR STATED GOALS: Excellent    PLAN    FREQUENCY/DURATION: No further speech therapy indicated at this time as oropharyngeal swallow function is within normal limits. - Recommendations for next treatment session: No additional speech therapy indicated at this time. SUBJECTIVE   Pleasant and cooperative. States he feels \"so much better\" today. History of Present Injury/Illness: Mr. Nadir Mckeon  has no past medical history on file. Luc Truong He also  has no past surgical history on file. Problem List:  (Impairments causing functional limitations):  1.  Oropharyngeal dysphagia- No symptoms identified    Previous Dysphagia: NONE REPORTED  Diet Prior to Evaluation: Regular/thin liquids    Orientation:   Person  Place  Time  Situation    Pain: Pain Scale 1: Numeric (0 - 10)  Pain Intensity 1: 0    OBJECTIVE   Oral Motor:   · Labial: No impairment  · Dentition: Upper Dentures and Lower Dentures  · Oral Hygiene: Adequate  · Lingual: No impairment    Swallow evaluation:   Patient presented with thin liquid via cup and straw, puree, mixed, and solid consistencies. Appropriate oral prep with all textures. Timely swallow initiation, and single swallows upon palpation. Adequate oral clearing. No overt signs or symptoms of airway compromise observed with liquid or solid textures. Cognitive Linguistic Assessment :  Initial cognitive-linguistic evaluation completed using portions of Amthieu Cognitive Assessment and informal assessment tasks. Results as follows:              Orientation:              Namin/8             Memory: Immediate memory ; delayed recall              Language: 3/3            Attention:             Abstraction:   Patient communicates at the conversational level with appropriate word finding and complex sentences. No word finding difficulties identified. He described basic daily responsibilities appropriately. Simple and complex commands followed with 100% accuracy. He responded to open ended questions without error. Speech 100% intelligible. Patient reports he is back at baseline- does not endorse any changes to cognitive-linguistic abilities. INTERDISCIPLINARY COLLABORATION: Registered Nurse  PRECAUTIONS/ALLERGIES: Patient has no known allergies.      Tool Used: Dysphagia Outcome and Severity Scale (ARLEY)    Score Comments   Normal Diet  [] 7 With no strategies or extra time needed   Functional Swallow  [] 6 May have mild oral or pharyngeal delay   Mild Dysphagia  [] 5 Which may require one diet consistency restricted    Mild-Moderate Dysphagia  [] 4 With 1-2 diet consistencies restricted   Moderate Dysphagia  [] 3 With 2 or more diet consistencies restricted   Moderate-Severe Dysphagia  [] 2 With partial PO strategies (trials with ST only)   Severe Dysphagia [] 1 With inability to tolerate any PO safely      Score:  Initial: 7 Most Recent: x (Date 01/07/21 )   Interpretation of Tool: The Dysphagia Outcome and Severity Scale (ARLEY) is a simple, easy-to-use, 7-point scale developed to systematically rate the functional severity of dysphagia based on objective assessment and make recommendations for diet level, independence level, and type of nutrition. Tool Used: MODIFIED ROYAL SCALE (mRS)   Score   No Symptoms  [] 0   No significant disability despite symptoms; able to carry out all usual duties and activities  [] 1   Slight disability; unable to carry out all previous activities but able to look after own affairs without assistance. [] 2   Moderate disability; requiring some help but able to walk without assistance  [] 3   Moderately severe disability; unable to walk without assistance and unable to attend to own bodily needs without assistance  [] 4   Severe disability; bedridden, incontinent, and requiring constant nursing care and attention  [] 5      Score:  Initial: 0    Interpretation of Tool: The Modified [place] Scale is a 7-point scaled used to quantify level of disability as it relates to a patient's functional abilities. Current Medications:   No current facility-administered medications on file prior to encounter. Current Outpatient Medications on File Prior to Encounter   Medication Sig Dispense Refill    telmisartan-hydroCHLOROthiazide (MICARDIS HCT) 40-12.5 mg per tablet Take 1 Tab by mouth daily.  tamsulosin (FLOMAX) 0.4 mg capsule Take 0.4 mg by mouth daily.  pravastatin (PRAVACHOL) 40 mg tablet Take 40 mg by mouth nightly.  aspirin 81 mg chewable tablet Take 81 mg by mouth daily.  latanoprost (XALATAN) 0.005 % ophthalmic solution Administer 1 Drop to both eyes nightly.  budesonide-formoteroL (Symbicort) 160-4.5 mcg/actuation HFAA Take 2 Puffs by inhalation.          SAFETY:  After treatment position/precautions:  · Upright in bed  · RN notified    Total Treatment Duration:   Time In: 0950  Time Out: 1017    IRAM Peralta, CCC-SLP

## 2021-01-07 NOTE — PROGRESS NOTES
Bedside and Verbal shift change report given to Dorota Freeman RN (oncoming nurse) by Cassi Carrillo RN (offgoing nurse). Report included the following information SBAR, Kardex, Intake/Output and Recent Results.     Dual RN NIH completed at bedside

## 2021-01-07 NOTE — PROGRESS NOTES
ACUTE OT GOALS:  (Developed with and agreed upon by patient and/or caregiver.)  1. Patient will complete lower body bathing and dressing with MOD I and adaptive equipment as needed. 2. Patient will complete toileting with MOD I.   3. Patient will tolerate 25 minutes of OT treatment with 1-2 rest breaks to increase activity tolerance for ADLs. 4. Patient will complete functional transfers with MOD I and adaptive equipment as needed. 5. Patient will complete functional mobility for household distances with MOD I and adaptive equipment as needed. 6. Patient will complete self-grooming while standing edge of sink with MOD I and adaptive equipment as needed. Timeframe: 7 visits         OCCUPATIONAL THERAPY ASSESSMENT: Initial Assessment and Daily Note OT Treatment Day # 1    Maysimin Gonzalez is a 79 y.o. male   PRIMARY DIAGNOSIS: SAH (subarachnoid hemorrhage) ([facility] [address]  SAH (subarachnoid hemorrhage) ([facility] [address] [facility] [I60.9]       Reason for Referral:    ICD-10: Treatment Diagnosis: Generalized Muscle Weakness (M62.81)  [place] / Product Type: Medicare /   ASSESSMENT:     REHAB RECOMMENDATIONS:   Recommendation to date pending progress:  [address] [place] [address] Therapy vs no needs  Equipment:    None     PRIOR LEVEL OF FUNCTION:  (Prior to Hospitalization)  INITIAL/CURRENT LEVEL OF FUNCTION:  (Most Recently Demonstrated)   Bathing:   Independent  Dressing:   Independent  Feeding/Grooming:   Independent  Toileting:   Independent  Functional Mobility:   Independent Bathing:   Not tested  Dressing:   Moderate Assistance  Feeding/Grooming:   Set Up  Toileting:   Standby Assistance  Functional Mobility:   Minimal Assistance     ASSESSMENT:  Mr. Iglesia Albarran presents for the above diagnoses with a MAP goal 70-90. Today, pt presents with deficits in overall strength, activity tolerance, ADL performance and functional mobility.  BUE AROM and strength (5/5) are generally decreased but WFL. Min A for functional mobility/transfers. SBA for self-grooming tasks while standing edge of sink; toileting hygiene completed with supervision. Min A for donning socks. At this time, Rico Bernard is functioning below baseline for ADLs and functional mobility. Pt would benefit from skilled OT services to address OT goals and and plan of care. SUBJECTIVE:   Mr. Karlo Byrne states, \"I feel good. .. I think. .\"    SOCIAL HISTORY/LIVING ENVIRONMENT:        OBJECTIVE:     PAIN: VITAL SIGNS: LINES/DRAINS:   Pre Treatment: Pain Screen  Pain Scale 1: Numeric (0 - 10)  Pain Intensity 1: 0  Post Treatment: 0   Garza Catheter and IV  O2 Device: Room air     GROSS EVALUATION:  BUEs Within Functional Limits Abnormal/ Functional Abnormal/ Non-Functional (see comments) Not Tested Comments:   AROM [x] [] [] []    PROM [x] [] [] []    Strength [x] [] [] []    Balance [x] [] [] []    Posture [x] [] [] []    Sensation [x] [] [] []    Coordination [x] [] [] []    Tone [x] [] [] []    Edema [x] [] [] []    Activity Tolerance [x] [] [] []     [] [] [] []      COGNITION/  PERCEPTION: Intact Impaired   (see comments) Comments:   Orientation [x] []    Vision [x] []    Hearing [x] []    Judgment/ Insight [x] []    Attention [x] []    Memory [x] []    Command Following [x] []    Emotional Regulation [x] []     [] []      ACTIVITIES OF DAILY LIVING: I Mod I S SBA CGA Min Mod Max Total NT Comments   BASIC ADLs:              Bathing/ Showering [] [] [] [] [] [x] [] [] [] []    Toileting [] [] [] [] [] [x] [] [] [] []    Dressing [] [] [] [] [] [x] [] [] [] []    Feeding [] [] [x] [] [] [] [] [] [] []    Grooming [] [] [x] [] [] [] [] [] [] []    Personal Device Care [] [] [] [] [] [x] [] [] [] []    Functional Mobility [] [] [] [] [x] [] [] [] [] []    INSTRUMENTAL ADLs:              Care of Others [] [] [] [] [] [] [] [] [] [x]    Community Mobility [] [] [] [] [] [] [] [] [] [x]    Financial Management [] [] [] [] [] [] [] [] [] [x]    Home Management [] [] [] [] [] [] [] [] [] [x]    Meal Preparation [] [] [] [] [] [] [] [] [] [x]    Health Management [] [] [] [] [] [] [] [] [] [x]    Work/Leisure [] [] [] [] [] [] [] [] [] [x]    I=Independent, Mod I=Modified Independent, S=Supervision, SBA=Standby Assistance, CGA=Contact Guard Assistance,   Min=Minimal Assistance, Mod=Moderate Assistance, Max=Maximal Assistance, Total=Total Assistance, NT=Not Tested    MOBILITY: I Mod I S SBA CGA Min Mod Max Total  NT x2 Comments:   Supine to sit [] [] [] [] [] [x] [] [] [] [] []    Sit to supine [] [] [] [] [] [x] [] [] [] [] []    Sit to stand [] [] [] [] [x] [] [] [] [] [] []    Bed to chair [] [] [] [] [x] [x] [] [] [] [] []    I=Independent, Mod I=Modified Independent, S=Supervision, SBA=Standby Assistance, CGA=Contact Guard Assistance,   Min=Minimal Assistance, Mod=Moderate Assistance, Max=Maximal Assistance, Total=Total Assistance, NT=Not [place] [facility] 6 Clicks   Daily Activity Inpatient Short Form        How much help from another person does the patient currently need. .. Total A Lot A Little None   1. Putting on and taking off regular lower body clothing? [] 1   [] 2   [x] 3   [] 4   2. Bathing (including washing, rinsing, drying)? [] 1   [] 2   [x] 3   [] 4   3. Toileting, which includes using toilet, bedpan or urinal?   [] 1   [] 2   [x] 3   [] 4   4. Putting on and taking off regular upper body clothing? [] 1   [] 2   [x] 3   [] 4   5. Taking care of personal grooming such as brushing teeth? [] 1   [] 2   [x] 3   [] 4   6. Eating meals? [] 1   [] 2   [x] 3   [] 4   © 2007, Trustees of [facility], under license to [employer]. All rights reserved     Score:  Initial: 18 Most Recent: X (Date: -- )   Interpretation of Tool:  Represents activities that are increasingly more difficult (i.e. Bed mobility, Transfers, Gait).     PLAN:   FREQUENCY/DURATION: OT Plan of Care: 3 times/week for duration of hospital stay or until stated goals are met, whichever comes first.    PROBLEM LIST:   (Skilled intervention is medically necessary to address:)  1. Decreased ADL/Functional Activities  2. Decreased Activity Tolerance  3. Decreased Balance  4. Decreased Coordination  5. Decreased Gait Ability  6. Decreased Strength  7. Decreased Transfer Abilities   INTERVENTIONS PLANNED:   (Benefits and precautions of occupational therapy have been discussed with the patient.)  1. Self Care Training  2. Therapeutic Activity  3. Therapeutic Exercise/HEP  4. Neuromuscular Re-education  5. Education     TREATMENT:     EVALUATION: Low Complexity : (Untimed Charge)    TREATMENT:   ($$ Self Care/Home Management: 23-37 mins$$ Neuromuscular Re-Education: 8-22 mins   )  Self Care (25 Minutes): Self care including Toileting, Lower Body Dressing and Grooming to increase independence. Neuromuscular Re-education (13 Minutes): Neuromuscular Re-education included Balance Training, Coordination training, Postural training and Sitting balance training to improve Balance, Coordination and Postural Control.     AFTER TREATMENT POSITION/PRECAUTIONS:  Alarm Activated, Chair, Needs within reach and RN notified    INTERDISCIPLINARY COLLABORATION:  RN/PCT, PT/PTA and OT/RG    TOTAL TREATMENT DURATION:  OT Patient Time In/Time Out  Time In: [address]  Time Out: [address], OT

## 2021-01-08 LAB
ANION GAP SERPL CALC-SCNC: 5 MMOL/L (ref 7–16)
BASOPHILS # BLD: 0.1 K/UL (ref 0–0.2)
BASOPHILS NFR BLD: 1 % (ref 0–2)
BUN SERPL-MCNC: 13 MG/DL (ref 8–23)
CALCIUM SERPL-MCNC: 8 MG/DL (ref 8.3–10.4)
CHLORIDE SERPL-SCNC: 110 MMOL/L (ref 98–107)
CO2 SERPL-SCNC: 25 MMOL/L (ref 21–32)
CREAT SERPL-MCNC: 1.38 MG/DL (ref 0.8–1.5)
DIFFERENTIAL METHOD BLD: ABNORMAL
EOSINOPHIL # BLD: 0.4 K/UL (ref 0–0.8)
EOSINOPHIL NFR BLD: 6 % (ref 0.5–7.8)
ERYTHROCYTE [DISTWIDTH] IN BLOOD BY AUTOMATED COUNT: 12.6 % (ref 11.9–14.6)
GLUCOSE SERPL-MCNC: 95 MG/DL (ref 65–100)
HCT VFR BLD AUTO: 33.5 % (ref 41.1–50.3)
HGB BLD-MCNC: 10.8 G/DL (ref 13.6–17.2)
IMM GRANULOCYTES # BLD AUTO: 0 K/UL (ref 0–0.5)
IMM GRANULOCYTES NFR BLD AUTO: 0 % (ref 0–5)
LYMPHOCYTES # BLD: 2.2 K/UL (ref 0.5–4.6)
LYMPHOCYTES NFR BLD: 37 % (ref 13–44)
MAGNESIUM SERPL-MCNC: 2.4 MG/DL (ref 1.8–2.4)
MCH RBC QN AUTO: 31.6 PG (ref 26.1–32.9)
MCHC RBC AUTO-ENTMCNC: 32.2 G/DL (ref 31.4–35)
MCV RBC AUTO: 98 FL (ref 79.6–97.8)
MONOCYTES # BLD: 0.5 K/UL (ref 0.1–1.3)
MONOCYTES NFR BLD: 9 % (ref 4–12)
NEUTS SEG # BLD: 2.9 K/UL (ref 1.7–8.2)
NEUTS SEG NFR BLD: 47 % (ref 43–78)
NRBC # BLD: 0 K/UL (ref 0–0.2)
PLATELET # BLD AUTO: 159 K/UL (ref 150–450)
PMV BLD AUTO: 10.8 FL (ref 9.4–12.3)
POTASSIUM SERPL-SCNC: 3.3 MMOL/L (ref 3.5–5.1)
POTASSIUM SERPL-SCNC: 3.8 MMOL/L (ref 3.5–5.1)
RBC # BLD AUTO: 3.42 M/UL (ref 4.23–5.6)
SODIUM SERPL-SCNC: 140 MMOL/L (ref 136–145)
WBC # BLD AUTO: 6.1 K/UL (ref 4.3–11.1)

## 2021-01-08 PROCEDURE — 74011000258 HC RX REV CODE- 258: Performed by: NURSE PRACTITIONER

## 2021-01-08 PROCEDURE — 93886 INTRACRANIAL COMPLETE STUDY: CPT

## 2021-01-08 PROCEDURE — 74011000250 HC RX REV CODE- 250: Performed by: NURSE PRACTITIONER

## 2021-01-08 PROCEDURE — 74011250636 HC RX REV CODE- 250/636: Performed by: NURSE PRACTITIONER

## 2021-01-08 PROCEDURE — 74011250637 HC RX REV CODE- 250/637: Performed by: NEUROLOGICAL SURGERY

## 2021-01-08 PROCEDURE — 74011250637 HC RX REV CODE- 250/637: Performed by: NURSE PRACTITIONER

## 2021-01-08 PROCEDURE — 65610000006 HC RM INTENSIVE CARE

## 2021-01-08 PROCEDURE — 93886 INTRACRANIAL COMPLETE STUDY: CPT | Performed by: PSYCHIATRY & NEUROLOGY

## 2021-01-08 PROCEDURE — 99232 SBSQ HOSP IP/OBS MODERATE 35: CPT | Performed by: NURSE PRACTITIONER

## 2021-01-08 PROCEDURE — 97535 SELF CARE MNGMENT TRAINING: CPT

## 2021-01-08 PROCEDURE — 80048 BASIC METABOLIC PNL TOTAL CA: CPT

## 2021-01-08 PROCEDURE — 83735 ASSAY OF MAGNESIUM: CPT

## 2021-01-08 PROCEDURE — 84132 ASSAY OF SERUM POTASSIUM: CPT

## 2021-01-08 PROCEDURE — 2709999900 HC NON-CHARGEABLE SUPPLY

## 2021-01-08 PROCEDURE — 36592 COLLECT BLOOD FROM PICC: CPT

## 2021-01-08 PROCEDURE — 85025 COMPLETE CBC W/AUTO DIFF WBC: CPT

## 2021-01-08 RX ORDER — POTASSIUM CHLORIDE 14.9 MG/ML
20 INJECTION INTRAVENOUS
Status: COMPLETED | OUTPATIENT
Start: 2021-01-08 | End: 2021-01-08

## 2021-01-08 RX ADMIN — NIMODIPINE 60 MG: 30 CAPSULE, LIQUID FILLED ORAL at 23:04

## 2021-01-08 RX ADMIN — FAMOTIDINE 20 MG: 20 TABLET, FILM COATED ORAL at 08:04

## 2021-01-08 RX ADMIN — NIMODIPINE 60 MG: 30 CAPSULE, LIQUID FILLED ORAL at 15:14

## 2021-01-08 RX ADMIN — SODIUM CHLORIDE 75 ML/HR: 900 INJECTION, SOLUTION INTRAVENOUS at 04:30

## 2021-01-08 RX ADMIN — URINARY PAIN RELIEF 95 MG: 95 TABLET ORAL at 15:14

## 2021-01-08 RX ADMIN — TAMSULOSIN HYDROCHLORIDE 0.4 MG: 0.4 CAPSULE ORAL at 08:04

## 2021-01-08 RX ADMIN — POTASSIUM CHLORIDE 20 MEQ: 14.9 INJECTION, SOLUTION INTRAVENOUS at 07:45

## 2021-01-08 RX ADMIN — NIMODIPINE 60 MG: 30 CAPSULE, LIQUID FILLED ORAL at 12:26

## 2021-01-08 RX ADMIN — ENOXAPARIN SODIUM 30 MG: 30 INJECTION SUBCUTANEOUS at 12:26

## 2021-01-08 RX ADMIN — SODIUM CHLORIDE 5 MG/HR: 900 INJECTION, SOLUTION INTRAVENOUS at 01:26

## 2021-01-08 RX ADMIN — NIMODIPINE 60 MG: 30 CAPSULE, LIQUID FILLED ORAL at 08:03

## 2021-01-08 RX ADMIN — ATORVASTATIN CALCIUM 40 MG: 40 TABLET, FILM COATED ORAL at 21:43

## 2021-01-08 RX ADMIN — CEFTRIAXONE SODIUM 1 G: 1 INJECTION, POWDER, FOR SOLUTION INTRAMUSCULAR; INTRAVENOUS at 12:27

## 2021-01-08 RX ADMIN — URINARY PAIN RELIEF 95 MG: 95 TABLET ORAL at 08:04

## 2021-01-08 RX ADMIN — SODIUM CHLORIDE 5 MG/HR: 900 INJECTION, SOLUTION INTRAVENOUS at 15:11

## 2021-01-08 RX ADMIN — DOCUSATE SODIUM 50 MG AND SENNOSIDES 8.6 MG 2 TABLET: 8.6; 5 TABLET, FILM COATED ORAL at 21:43

## 2021-01-08 RX ADMIN — POTASSIUM CHLORIDE 20 MEQ: 14.9 INJECTION, SOLUTION INTRAVENOUS at 04:52

## 2021-01-08 RX ADMIN — LATANOPROST 1 DROP: 50 SOLUTION OPHTHALMIC at 22:00

## 2021-01-08 RX ADMIN — NIMODIPINE 60 MG: 30 CAPSULE, LIQUID FILLED ORAL at 20:00

## 2021-01-08 RX ADMIN — NIMODIPINE 60 MG: 30 CAPSULE, LIQUID FILLED ORAL at 00:05

## 2021-01-08 RX ADMIN — NIMODIPINE 60 MG: 30 CAPSULE, LIQUID FILLED ORAL at 04:52

## 2021-01-08 RX ADMIN — MAGNESIUM GLUCONATE 500 MG ORAL TABLET 400 MG: 500 TABLET ORAL at 17:06

## 2021-01-08 RX ADMIN — URINARY PAIN RELIEF 95 MG: 95 TABLET ORAL at 21:43

## 2021-01-08 RX ADMIN — ACETAMINOPHEN 650 MG: 325 TABLET, FILM COATED ORAL at 17:06

## 2021-01-08 RX ADMIN — SODIUM CHLORIDE 5 MG/HR: 900 INJECTION, SOLUTION INTRAVENOUS at 09:03

## 2021-01-08 RX ADMIN — MAGNESIUM GLUCONATE 500 MG ORAL TABLET 400 MG: 500 TABLET ORAL at 08:04

## 2021-01-08 RX ADMIN — MAGNESIUM SULFATE HEPTAHYDRATE 2 G: 40 INJECTION, SOLUTION INTRAVENOUS at 04:52

## 2021-01-08 RX ADMIN — SODIUM CHLORIDE 75 ML/HR: 900 INJECTION, SOLUTION INTRAVENOUS at 17:06

## 2021-01-08 RX ADMIN — VALSARTAN: 80 TABLET ORAL at 08:04

## 2021-01-08 NOTE — PROGRESS NOTES
Bedside and Verbal shift change report given to Zain Dimas RN (oncoming nurse) by Alden Fatima RN (offgoing nurse). Report included the following information SBAR, Kardex, ED Summary, OR Summary, Procedure Summary, Intake/Output, MAR, Accordion and Recent Results.

## 2021-01-08 NOTE — PROGRESS NOTES
Care Management Interventions  PCP Verified by CM: Yes(Asif)  Mode of Transport at Discharge: Other (see comment)  Transition of Care Consult (CM Consult): Discharge Planning  Discharge Durable Medical Equipment: (none)  Current Support Network: Lives Alone, Own Home  Confirm Follow Up Transport: Self  The Plan for Transition of Care is Related to the Following Treatment Goals : HH, outpt rehab vs STR  Name of the Patient Representative Who was Provided with a Choice of Provider and Agrees with the Discharge Plan: daughterFaheem of Choice List was Provided with Basic Dialogue that Supports the Patient's Individualized Plan of Care/Goals, Treatment Preferences and Shares the Quality Data Associated with the Providers?: Yes  The [employer] Information Provided?: (MCR/BCBS)  Discharge Location  Discharge Placement: Unable to determine at this time    CM met with pt to discuss DCP. Pt states he doesn't feel like he'll need therapy once discharged. OT has documented no skilled therapy needed. CM to continue to follow and update DCP.

## 2021-01-08 NOTE — PROGRESS NOTES
Bedside, Verbal and Written shift change report given to [address] (oncoming nurse) by Eris Nguyen (offgoing nurse). Report included the following information SBAR, Kardex, ED Summary, Procedure Summary, Intake/Output, MAR, Accordion, Recent Results, Med Rec Status, Cardiac Rhythm NSR and Alarm Parameters . Dual NIH/neuro assessment performed.

## 2021-01-08 NOTE — PROGRESS NOTES
A follow up visit was made to the patient. Emotional support, spiritual presence and   prayer were provided for the patient. The patient was having a medical procedure. He asked that the  pray for the conditions within our world. He shared that he is a Sunday  in his [employer].       Idania Salinas, [address] [facility] [place]

## 2021-01-08 NOTE — PROGRESS NOTES
ACUTE OT GOALS:  (Developed with and agreed upon by patient and/or caregiver.)  (Developed with and agreed upon by patient and/or caregiver.)  1. Patient will complete lower body bathing and dressing with MOD I and adaptive equipment as needed. 2. Patient will complete toileting with MOD I.   3. Patient will tolerate 25 minutes of OT treatment with 1-2 rest breaks to increase activity tolerance for ADLs. 4. Patient will complete functional transfers with MOD I and adaptive equipment as needed. 5. Patient will complete functional mobility for household distances with MOD I and adaptive equipment as needed. 6. Patient will complete self-grooming while standing edge of sink with MOD I and adaptive equipment as needed.     Timeframe: 7 visits     OCCUPATIONAL THERAPY: Daily Note OT Treatment Day # 2    Karely Campbell is a 79 y.o. male   PRIMARY DIAGNOSIS: SAH (subarachnoid hemorrhage) ([facility] [address]  SAH (subarachnoid hemorrhage) ([facility] [address] [I60.9]       [place] / [place] / Product Type: Medicare /   ASSESSMENT:     REHAB RECOMMENDATIONS: CURRENT LEVEL OF FUNCTION:  (Most Recently Demonstrated)   Recommendation to date pending progress:  Setting:   no anticipated OT needs at d/c  Equipment:    None Bathing:   Not tested  Dressing:   Not tested  Feeding/Grooming:   Set Up  Toileting:   Supervision  Functional Mobility:   Contact Guard Assistance     ASSESSMENT:  Mr. Lucy Dominguez presents with continued deficits in overall strength, activity tolerance, ADL performance, and functional mobility. MAP goal of 70-90. Pt completed bed mobility with min A; stood and ambulated in room with CGA while managing IV pole. Completed toileting hygiene with supervision; completed hand hygiene standing edge of sink with supervision. Upon completion, pt ambulated to chair with CGA and left sitting upright in chair.  Pt progressing well towards goals; will continue to follow to address OT needs and plan of care.      SUBJECTIVE:   Mr. Brisa Alvarez states, \"I might better use the bathroom before sitting up in the chair. \"    SOCIAL HISTORY/LIVING ENVIRONMENT:   Home Environment: Private residence  # Steps to Enter: 2  One/Two Story Residence: Two story, live on 1st floor  Living Alone: Yes  Support Systems: Child(sonu)    OBJECTIVE:     PAIN: VITAL SIGNS: LINES/DRAINS:   Pre Treatment: Pain Screen  Pain Scale 1: Numeric (0 - 10)  Pain Intensity 1: 0  Post Treatment: 0   IV  O2 Device: Room air     ACTIVITIES OF DAILY LIVING: I Mod I S SBA CGA Min Mod Max Total NT Comments   BASIC ADLs:              Bathing/ Showering [] [] [] [] [] [x] [] [] [] []    Toileting [] [] [] [] [] [x] [] [] [] []    Dressing [] [] [] [] [] [x] [] [] [] []    Feeding [] [] [x] [] [] [] [] [] [] []    Grooming [] [] [x] [] [] [] [] [] [] []    Personal Device Care [] [] [] [] [] [x] [] [] [] []    Functional Mobility [] [] [] [] [x] [] [] [] [] []    I=Independent, Mod I=Modified Independent, S=Supervision, SBA=Standby Assistance, CGA=Contact Guard Assistance,   Min=Minimal Assistance, Mod=Moderate Assistance, Max=Maximal Assistance, Total=Total Assistance, NT=Not Tested    MOBILITY: I Mod I S SBA CGA Min Mod Max Total  NT x2 Comments:   Supine to sit [] [] [] [] [] [x] [] [] [] [] []    Sit to supine [] [] [] [] [] [x] [] [] [] [] []    Sit to stand [] [] [] [] [x] [] [] [] [] [] []    Bed to chair [] [] [] [] [x] [] [] [] [] [] []    I=Independent, Mod I=Modified Independent, S=Supervision, SBA=Standby Assistance, CGA=Contact Guard Assistance,   Min=Minimal Assistance, Mod=Moderate Assistance, Max=Maximal Assistance, Total=Total Assistance, NT=Not Tested    PLAN:   FREQUENCY/DURATION: OT Plan of Care: 3 times/week for duration of hospital stay or until stated goals are met, whichever comes first.    TREATMENT:   TREATMENT:   ($$ Self Care/Home Management: 8-22 mins    )  Self Care (10 Minutes): Self care including Toileting and Grooming to increase independence.     AFTER TREATMENT POSITION/PRECAUTIONS:  Chair, Needs within reach and RN notified    INTERDISCIPLINARY COLLABORATION:  RN/PCT and OT/RG    TOTAL TREATMENT DURATION:  OT Patient Time In/Time Out  Time In: 6862  Time Out: 100 [facility]

## 2021-01-08 NOTE — PROGRESS NOTES
Comprehensive Nutrition Assessment    Type and Reason for Visit: Reassess    Nutrition Recommendations/Plan:    Continue current diet     Malnutrition Assessment:  Malnutrition Status: No malnutrition    Nutrition Assessment:   Nutrition History: Pt denies any change in oral intake pta. He indicates when and how frequently he eats is dependent on who is home and what time he gets up. Nutrition Background: PMH remarkable for HTN, Hyperlipidemia, BPH, asthma, anemia, Syncope. Admitted with [address] Daily Update:  Patient seen in follow-up. He states that he is doing well. He states that he typically is not a big eater at baseline. He states that he would have eaten more breakfast, but he accidentally added too much salt. He states he ate all of dinner last night. He states appetite is good and denies any difficulties with PO.     Nutrition Related Findings:   No physical findings indicative of malnutrition       Current Nutrition Therapies:  DIET REGULAR    Current Intake:   Average Meal Intake: % Average Supplement Intake: None ordered      Anthropometric Measures:  Height: 5' 7.5\" (171.5 cm)  Current Body Wt: 117.9 kg (259 lb 14.8 oz)(1/5), Weight source: Not specified  BMI: 40.1, Obese class 3 (BMI 40.0 or greater)  Admission Body Weight: 259 lb 14.8 oz(no source)  Ideal Body Wt: 151 lbs (69 kg), 172.1 %  Usual Body Wt: (denies change),            Estimated Daily Nutrient Needs:  Energy (kcal/day): 5871-2416 (Kcal/kg(15-20), Weight Used: Admission(117.9 kg))  Protein (g/day):  g (20%/kcal) Weight Used: (Admission)  Fluid (ml/day):   (1 ml/kcal)    Nutrition Diagnosis:   No nutrition diagnosis at this time     Nutrition Interventions:   Food and/or Nutrient Delivery: Continue current diet     Coordination of Nutrition Care: Continue to monitor while inpatient    Goals: Previous Goal Met: Goal(s) achieved  Active Goal: Continue to meet at least 75% needs     Nutrition Monitoring and Evaluation: Food/Nutrient Intake Outcomes: Food and nutrient intake       Discharge Planning:    No discharge needs at this time    [address] [place], LD on 1/8/2021 at 10:55 AM  Contact: [phone]        Disaster Mode active

## 2021-01-08 NOTE — PROGRESS NOTES
Progress Note    Patient: Fabricio Castellano MRN: [id number]  SSN: [id number]    YOB: 1940  Age: 79 y.o. Sex: male      Admit Date: 1/5/2021    LOS: 3 days     Subjective:   PBD: 4  No neuro changes. Looks good this am  aox3 and In nad.   Mri cspine: neg for any dural AVF    Current Facility-Administered Medications   Medication Dose Route Frequency    potassium chloride 20 mEq in 100 ml IVPB  20 mEq IntraVENous Q2H    cefTRIAXone (ROCEPHIN) 1 g in 0.9% sodium chloride (MBP/ADV) 50 mL MBP  1 g IntraVENous Q24H    phenazopyridine (PYRIDIUM) tab 95 mg  95 mg Oral TID    enoxaparin (LOVENOX) injection 30 mg  30 mg SubCUTAneous Q24H    famotidine (PEPCID) tablet 20 mg  20 mg Oral DAILY    latanoprost (XALATAN) 0.005 % ophthalmic solution 1 Drop  1 Drop Both Eyes QHS    tamsulosin (FLOMAX) capsule 0.4 mg  0.4 mg Oral DAILY    valsartan/hydroCHLOROthiazide (DIOVAN HCT)  80/12.5 mg   Oral DAILY    ondansetron (ZOFRAN) injection 4 mg  4 mg IntraVENous Q6H PRN    atorvastatin (LIPITOR) tablet 40 mg  40 mg Oral QHS    niMODipine (NIMOTOP) capsule 60 mg  60 mg Oral Q4H    acetaminophen (TYLENOL) tablet 650 mg  650 mg Oral Q4H PRN    senna-docusate (PERICOLACE) 8.6-50 mg per tablet 2 Tab  2 Tab Oral QHS    NUTRITIONAL SUPPORT ELECTROLYTE PRN ORDERS   Does Not Apply PRN    niCARdipine (CARDENE) 25 mg in 0.9% sodium chloride (MBP/ADV) 250 mL infusion  0-15 mg/hr IntraVENous TITRATE    magnesium sulfate 2 g/50 ml IVPB (premix or compounded)  2 g IntraVENous DAILY PRN    magnesium oxide (MAG-OX) tablet 400 mg  400 mg Oral BID    magnesium sulfate 4 g/100 mL IVPB  4 g IntraVENous DAILY PRN    ketorolac (TORADOL) injection 15 mg  15 mg IntraVENous Q6H PRN    fentaNYL citrate (PF) injection 50 mcg  50 mcg IntraVENous Q2H PRN    0.9% sodium chloride infusion  75 mL/hr IntraVENous CONTINUOUS       Objective:     Vitals:    01/08/21 0816 01/08/21 0817 01/08/21 0830 01/08/21 0845   BP: 122/81 124/65 117/61 (!) 113/57   Pulse: 93 93 96 87   Resp:       Temp:       SpO2: 95% 95% 96% 94%   Weight:       Height:             Intake and Output:  Current Shift: 01/08 0701 - 01/08 1900  In: -   Out: 100 [Urine:100]  Last 24 hr: 01/07 0701 - 01/08 0700  In: 3293.8 [P.O.:60; I.V.:3233.8]  Out: 1135 [Urine:1135]         Physical Exam:   General:  Alert, cooperative, no distress   Eyes:  PERRL+3, midline   Neck: Supple, symmetrical, trachea midline, no adenopathy, thyroid: no enlargment/tenderness/nodules, no carotid bruit and no JVD. Lungs:   Clear to auscultation bilaterally. Heart:  Regular rate and rhythm, S1, S2 normal, no murmur, click, rub or gallop. Abdomen:   Soft, non-tender. Bowel sounds normal. No masses,  No organomegaly. Complains of burning with urination. Extremities: Extremities normal, atraumatic, no cyanosis or edema. Pulses: 2+ and symmetric all extremities. Skin: Skin color, texture, turgor normal. No rashes or lesions   Neurologic: CNII-XII intact. Normal strength, sensation and reflexes throughout. Lab/Data Review:    Recent Results (from the past 12 hour(s))   CBC WITH AUTOMATED DIFF    Collection Time: 01/08/21  3:24 AM   Result Value Ref Range    WBC 6.1 4.3 - 11.1 K/uL    RBC 3.42 (L) 4.23 - 5.6 M/uL    HGB 10.8 (L) 13.6 - 17.2 g/dL    HCT 33.5 (L) 41.1 - 50.3 %    MCV 98.0 (H) 79.6 - 97.8 FL    MCH 31.6 26.1 - 32.9 PG    MCHC 32.2 31.4 - 35.0 g/dL    RDW 12.6 11.9 - 14.6 %    PLATELET 049 234 - 204 K/uL    MPV 10.8 9.4 - 12.3 FL    ABSOLUTE NRBC 0.00 0.0 - 0.2 K/uL    DF AUTOMATED      NEUTROPHILS 47 43 - 78 %    LYMPHOCYTES 37 13 - 44 %    MONOCYTES 9 4.0 - 12.0 %    EOSINOPHILS 6 0.5 - 7.8 %    BASOPHILS 1 0.0 - 2.0 %    IMMATURE GRANULOCYTES 0 0.0 - 5.0 %    ABS. NEUTROPHILS 2.9 1.7 - 8.2 K/UL    ABS. LYMPHOCYTES 2.2 0.5 - 4.6 K/UL    ABS. MONOCYTES 0.5 0.1 - 1.3 K/UL    ABS. EOSINOPHILS 0.4 0.0 - 0.8 K/UL    ABS. BASOPHILS 0.1 0.0 - 0.2 K/UL    ABS. IMM.  GRANS. 0.0 0.0 - 0.5 K/UL   METABOLIC PANEL, BASIC    Collection Time: 01/08/21  3:24 AM   Result Value Ref Range    Sodium 140 136 - 145 mmol/L    Potassium 3.3 (L) 3.5 - 5.1 mmol/L    Chloride 110 (H) 98 - 107 mmol/L    CO2 25 21 - 32 mmol/L    Anion gap 5 (L) 7 - 16 mmol/L    Glucose 95 65 - 100 mg/dL    BUN 13 8 - 23 MG/DL    Creatinine 1.38 0.8 - 1.5 MG/DL    GFR est AA >60 >60 ml/min/1.73m2    GFR est non-AA 53 (L) >60 ml/min/1.73m2    Calcium 8.0 (L) 8.3 - 10.4 MG/DL   MAGNESIUM    Collection Time: 01/08/21  3:24 AM   Result Value Ref Range    Magnesium 2.4 1.8 - 2.4 mg/dL       Assessment/ Plan:     Principal Problem:    SAH (subarachnoid hemorrhage) ([facility]) (1/5/2021)        NEURO: SAH and in ICU under SAH protocol. Will do cerebral angiogram today. Daughter, Karishma, has been updated and is NOK.  Consult PT/OT/ST/Rehab as warranted. Angio neg 1-6-21. Will repeat next week. TCD normal thus far. Looks great this am. Mild headache.  RESP:RA and tolerating well.   CV:MAP 70-90. cardene prn, trop mildly elevated, will trend . SCD/lovenox.  HEME: HH:10/33   NEPH:bun/cr: 13/1.3.  No dysuria today.  GI:Reg diet.  Pepcid, senna.   ID:98.7. Rocephin: D2/5. Pyridium D2/3.    LINES:IV, PICC,   Signed By: Dorothea Kraft, RAMÓN     January 8, 2021

## 2021-01-08 NOTE — PROGRESS NOTES
Bedside and Verbal shift change report to be given to Ping Coyne RN (oncoming nurse) by Emerita Musa RN (offgoing nurse). Report included the following information SBAR, Kardex, Intake/Output and Recent Results.    Dual RN NIH completed at bedside

## 2021-01-09 LAB
ANION GAP SERPL CALC-SCNC: 3 MMOL/L (ref 7–16)
BASOPHILS # BLD: 0 K/UL (ref 0–0.2)
BASOPHILS NFR BLD: 1 % (ref 0–2)
BUN SERPL-MCNC: 11 MG/DL (ref 8–23)
CALCIUM SERPL-MCNC: 8 MG/DL (ref 8.3–10.4)
CHLORIDE SERPL-SCNC: 112 MMOL/L (ref 98–107)
CO2 SERPL-SCNC: 26 MMOL/L (ref 21–32)
CREAT SERPL-MCNC: 1.31 MG/DL (ref 0.8–1.5)
DIFFERENTIAL METHOD BLD: ABNORMAL
EOSINOPHIL # BLD: 0.5 K/UL (ref 0–0.8)
EOSINOPHIL NFR BLD: 8 % (ref 0.5–7.8)
ERYTHROCYTE [DISTWIDTH] IN BLOOD BY AUTOMATED COUNT: 12.4 % (ref 11.9–14.6)
GLUCOSE SERPL-MCNC: 105 MG/DL (ref 65–100)
HCT VFR BLD AUTO: 34.1 % (ref 41.1–50.3)
HGB BLD-MCNC: 11 G/DL (ref 13.6–17.2)
IMM GRANULOCYTES # BLD AUTO: 0 K/UL (ref 0–0.5)
IMM GRANULOCYTES NFR BLD AUTO: 0 % (ref 0–5)
LYMPHOCYTES # BLD: 2.2 K/UL (ref 0.5–4.6)
LYMPHOCYTES NFR BLD: 37 % (ref 13–44)
MAGNESIUM SERPL-MCNC: 2.3 MG/DL (ref 1.8–2.4)
MCH RBC QN AUTO: 31.6 PG (ref 26.1–32.9)
MCHC RBC AUTO-ENTMCNC: 32.3 G/DL (ref 31.4–35)
MCV RBC AUTO: 98 FL (ref 79.6–97.8)
MONOCYTES # BLD: 0.6 K/UL (ref 0.1–1.3)
MONOCYTES NFR BLD: 10 % (ref 4–12)
NEUTS SEG # BLD: 2.6 K/UL (ref 1.7–8.2)
NEUTS SEG NFR BLD: 44 % (ref 43–78)
NRBC # BLD: 0 K/UL (ref 0–0.2)
PLATELET # BLD AUTO: 150 K/UL (ref 150–450)
PMV BLD AUTO: 10 FL (ref 9.4–12.3)
POTASSIUM SERPL-SCNC: 3.5 MMOL/L (ref 3.5–5.1)
RBC # BLD AUTO: 3.48 M/UL (ref 4.23–5.6)
SODIUM SERPL-SCNC: 141 MMOL/L (ref 138–145)
WBC # BLD AUTO: 5.9 K/UL (ref 4.3–11.1)

## 2021-01-09 PROCEDURE — 74011250637 HC RX REV CODE- 250/637: Performed by: NURSE PRACTITIONER

## 2021-01-09 PROCEDURE — 65610000006 HC RM INTENSIVE CARE

## 2021-01-09 PROCEDURE — 74011250636 HC RX REV CODE- 250/636: Performed by: NURSE PRACTITIONER

## 2021-01-09 PROCEDURE — 2709999900 HC NON-CHARGEABLE SUPPLY

## 2021-01-09 PROCEDURE — 93886 INTRACRANIAL COMPLETE STUDY: CPT

## 2021-01-09 PROCEDURE — 85025 COMPLETE CBC W/AUTO DIFF WBC: CPT

## 2021-01-09 PROCEDURE — 93886 INTRACRANIAL COMPLETE STUDY: CPT | Performed by: PSYCHIATRY & NEUROLOGY

## 2021-01-09 PROCEDURE — 80048 BASIC METABOLIC PNL TOTAL CA: CPT

## 2021-01-09 PROCEDURE — 99232 SBSQ HOSP IP/OBS MODERATE 35: CPT | Performed by: NURSE PRACTITIONER

## 2021-01-09 PROCEDURE — 97530 THERAPEUTIC ACTIVITIES: CPT

## 2021-01-09 PROCEDURE — 83735 ASSAY OF MAGNESIUM: CPT

## 2021-01-09 PROCEDURE — 74011000258 HC RX REV CODE- 258: Performed by: NURSE PRACTITIONER

## 2021-01-09 PROCEDURE — 74011000250 HC RX REV CODE- 250: Performed by: NURSE PRACTITIONER

## 2021-01-09 PROCEDURE — 74011250637 HC RX REV CODE- 250/637: Performed by: NEUROLOGICAL SURGERY

## 2021-01-09 RX ADMIN — VALSARTAN: 80 TABLET ORAL at 08:11

## 2021-01-09 RX ADMIN — ENOXAPARIN SODIUM 30 MG: 30 INJECTION SUBCUTANEOUS at 12:35

## 2021-01-09 RX ADMIN — SODIUM CHLORIDE 5 MG/HR: 900 INJECTION, SOLUTION INTRAVENOUS at 11:28

## 2021-01-09 RX ADMIN — ATORVASTATIN CALCIUM 40 MG: 40 TABLET, FILM COATED ORAL at 21:07

## 2021-01-09 RX ADMIN — CEFTRIAXONE SODIUM 1 G: 1 INJECTION, POWDER, FOR SOLUTION INTRAMUSCULAR; INTRAVENOUS at 12:35

## 2021-01-09 RX ADMIN — SODIUM CHLORIDE 75 ML/HR: 900 INJECTION, SOLUTION INTRAVENOUS at 08:28

## 2021-01-09 RX ADMIN — MAGNESIUM GLUCONATE 500 MG ORAL TABLET 400 MG: 500 TABLET ORAL at 17:21

## 2021-01-09 RX ADMIN — KETOROLAC TROMETHAMINE 15 MG: 30 INJECTION, SOLUTION INTRAMUSCULAR; INTRAVENOUS at 04:04

## 2021-01-09 RX ADMIN — TAMSULOSIN HYDROCHLORIDE 0.4 MG: 0.4 CAPSULE ORAL at 08:11

## 2021-01-09 RX ADMIN — NIMODIPINE 60 MG: 30 CAPSULE, LIQUID FILLED ORAL at 03:30

## 2021-01-09 RX ADMIN — KETOROLAC TROMETHAMINE 15 MG: 30 INJECTION, SOLUTION INTRAMUSCULAR; INTRAVENOUS at 21:17

## 2021-01-09 RX ADMIN — NIMODIPINE 60 MG: 30 CAPSULE, LIQUID FILLED ORAL at 23:00

## 2021-01-09 RX ADMIN — NIMODIPINE 60 MG: 30 CAPSULE, LIQUID FILLED ORAL at 21:07

## 2021-01-09 RX ADMIN — URINARY PAIN RELIEF 95 MG: 95 TABLET ORAL at 21:07

## 2021-01-09 RX ADMIN — SODIUM CHLORIDE 75 ML/HR: 900 INJECTION, SOLUTION INTRAVENOUS at 23:13

## 2021-01-09 RX ADMIN — MAGNESIUM GLUCONATE 500 MG ORAL TABLET 400 MG: 500 TABLET ORAL at 08:11

## 2021-01-09 RX ADMIN — NIMODIPINE 60 MG: 30 CAPSULE, LIQUID FILLED ORAL at 10:58

## 2021-01-09 RX ADMIN — SODIUM CHLORIDE 5 MG/HR: 900 INJECTION, SOLUTION INTRAVENOUS at 02:29

## 2021-01-09 RX ADMIN — URINARY PAIN RELIEF 95 MG: 95 TABLET ORAL at 17:21

## 2021-01-09 RX ADMIN — SODIUM CHLORIDE 5 MG/HR: 900 INJECTION, SOLUTION INTRAVENOUS at 16:37

## 2021-01-09 RX ADMIN — NIMODIPINE 60 MG: 30 CAPSULE, LIQUID FILLED ORAL at 14:13

## 2021-01-09 RX ADMIN — MAGNESIUM SULFATE HEPTAHYDRATE 2 G: 40 INJECTION, SOLUTION INTRAVENOUS at 05:20

## 2021-01-09 RX ADMIN — URINARY PAIN RELIEF 95 MG: 95 TABLET ORAL at 08:11

## 2021-01-09 RX ADMIN — ACETAMINOPHEN 650 MG: 325 TABLET, FILM COATED ORAL at 17:22

## 2021-01-09 RX ADMIN — FAMOTIDINE 20 MG: 20 TABLET, FILM COATED ORAL at 08:11

## 2021-01-09 RX ADMIN — DOCUSATE SODIUM 50 MG AND SENNOSIDES 8.6 MG 2 TABLET: 8.6; 5 TABLET, FILM COATED ORAL at 21:07

## 2021-01-09 RX ADMIN — LATANOPROST 1 DROP: 50 SOLUTION OPHTHALMIC at 22:00

## 2021-01-09 RX ADMIN — NIMODIPINE 60 MG: 30 CAPSULE, LIQUID FILLED ORAL at 17:22

## 2021-01-09 NOTE — PROGRESS NOTES
Bedside and Verbal shift change report given to Polly Suh rn (oncoming nurse) by Di Quezada (offgoing nurse). Report included the following information SBAR, Kardex, ED Summary, OR Summary, Procedure Summary, Intake/Output, MAR, Cardiac Rhythm NSR, Alarm Parameters  and Dual Neuro Assessment.

## 2021-01-09 NOTE — PROGRESS NOTES
ACUTE PHYSICAL THERAPY GOALS:  (Developed with and agreed upon by patient and/or caregiver. )  LTG:  (1.)Mr. Griselda Cotto will move from supine to sit and sit to supine , scoot up and down and roll side to side in bed with INDEPENDENT within 7 treatment day(s). (2.)Mr. Griselda Cotto will transfer from bed to chair and chair to bed with MODIFIED INDEPENDENCE using the least restrictive device within 7 treatment day(s). (3.)Mr. Griselda Cotto will ambulate with MODIFIED INDEPENDENCE for 500 feet with the least restrictive device within 7 treatment day(s). GOAL MET 1/9/2021    (4.)Mr. Griselda Cotto will participate in therapeutic activity/exercises x 25 minutes for increased strength within 7 treatment days. (5.)Mr. Griselda Cotto will ascend and descend 2 stairs using 0 hand rail(s) with SUPERVISION to improve functional mobility and safety within 7 day(s). PHYSICAL THERAPY: Daily Note and PM Treatment Day # 2    Julia Araujo is a 79 y.o. male   PRIMARY DIAGNOSIS: SAH (subarachnoid hemorrhage) ([facility] [address]  SAH (subarachnoid hemorrhage) ([facility] [address] [I60.9]         ASSESSMENT:     REHAB RECOMMENDATIONS: CURRENT LEVEL OF FUNCTION:  (Most Recently Demonstrated)   Recommendation to date pending progress:  Setting:   No further skilled therapy   Equipment:    None Bed Mobility:   Not tested  Sit to Stand:   Modified Independent  Transfers:   Supervision  Gait/Mobility:   Modified Independent     ASSESSMENT:  Mr. Griselda Cotto was up in the chair and willing to walk. He walked the whole floor today starting off with the walker and ditching it half way around. He was safe and walking very well although he was moderately SOB upon return (sats 96%)  Good progress and will continue to help mobilize. SUBJECTIVE:   Mr. Griselda Cotto states, \"ok. \"    SOCIAL HISTORY/ LIVING ENVIRONMENT:   Home Environment: Private residence  # Steps to Enter: 2  One/Two Story Residence: Two story, live on 1st floor  Living Alone: Yes  Support Systems: Child(sonu)  OBJECTIVE:     PAIN: VITAL SIGNS: LINES/DRAINS:   Pre Treatment: Pain Screen  Pain Scale 1: FLACC  Pain Intensity 1: 0  Post Treatment: none   none  O2 Device: Room air     MOBILITY: I Mod I S SBA CGA Min Mod Max Total  NT x2 Comments:   Bed Mobility    Rolling [] [] [] [] [] [] [] [] [] [] []    Supine to Sit [] [] [] [] [] [] [] [] [] [] []    Scooting [] [] [] [] [] [] [] [] [] [] []    Sit to Supine [] [] [] [] [] [] [] [] [] [] []    Transfers    Sit to Stand [x] [] [] [] [] [] [] [] [] [] []    Bed to Chair [] [] [] [] [] [] [] [] [] [] []    Stand to Sit [x] [] [] [] [] [] [] [] [] [] []    I=Independent, Mod I=Modified Independent, S=Supervision, SBA=Standby Assistance, CGA=Contact Guard Assistance,   Min=Minimal Assistance, Mod=Moderate Assistance, Max=Maximal Assistance, Total=Total Assistance, NT=Not Tested    GAIT: I Mod I S SBA CGA Min Mod Max Total  NT x2 Comments:   Level of Assistance [] [x] [] [] [] [] [] [] [] [] []    Distance 500    DME Rolling Walker but left it half way around    Gait Quality Good, slightly accelerated    Weightbearing  Status N/A     I=Independent, Mod I=Modified Independent, S=Supervision, SBA=Standby Assistance, CGA=Contact Guard Assistance,   Min=Minimal Assistance, Mod=Moderate Assistance, Max=Maximal Assistance, Total=Total Assistance, NT=Not Tested    PLAN:   FREQUENCY/DURATION: PT Plan of Care: 3 times/week for duration of hospital stay or until stated goals are met, whichever comes first.  TREATMENT:     TREATMENT:   ($$ Therapeutic Activity: 8-22 mins    )  Therapeutic Activity (15 Minutes): Therapeutic activity included Transfer Training and Ambulation on level ground to improve functional Activity tolerance.     AFTER TREATMENT POSITION/PRECAUTIONS:  Chair, Needs within reach and RN notified    INTERDISCIPLINARY COLLABORATION:  RN/PCT and PT/PTA    TOTAL TREATMENT DURATION:  PT Patient Time In/Time Out  Time In: 1430  Time Out: [address], [facility]

## 2021-01-09 NOTE — PROCEDURES
Transcranial Doppler    Transcranial Doppler studies were obtained on this patient for the evaluation of intracranial hemodynamics status post subarachnoid hemorrhage. Insonation was performed via the transtemporal and foramen magnum window utilizing a 2 MHz probe. Flow in the a left middle anterior and posterior cerebral arteries is physiologic. Flow in the right middle anterior and posterior cerebral arteries is physiologic. Flow velocities in the vertebrobasilar basilar system is antegrade vertebral flow bilaterally and normal physiologic basilar artery flow.     Impression    No evidence of intracranial vascular spasm is identified on this transcranial Doppler

## 2021-01-09 NOTE — PROGRESS NOTES
Progress Note    Patient: Vincent Quick MRN: [id number]  SSN: [id number]    YOB: 1940  Age: 79 y.o. Sex: male      Admit Date: 1/5/2021    LOS: 4 days     Subjective:   PBD: 5  No neuro changes. Looks good this am  aox3 and In nad.       Current Facility-Administered Medications   Medication Dose Route Frequency    cefTRIAXone (ROCEPHIN) 1 g in 0.9% sodium chloride (MBP/ADV) 50 mL MBP  1 g IntraVENous Q24H    phenazopyridine (PYRIDIUM) tab 95 mg  95 mg Oral TID    enoxaparin (LOVENOX) injection 30 mg  30 mg SubCUTAneous Q24H    famotidine (PEPCID) tablet 20 mg  20 mg Oral DAILY    latanoprost (XALATAN) 0.005 % ophthalmic solution 1 Drop  1 Drop Both Eyes QHS    tamsulosin (FLOMAX) capsule 0.4 mg  0.4 mg Oral DAILY    valsartan/hydroCHLOROthiazide (DIOVAN HCT)  80/12.5 mg   Oral DAILY    ondansetron (ZOFRAN) injection 4 mg  4 mg IntraVENous Q6H PRN    atorvastatin (LIPITOR) tablet 40 mg  40 mg Oral QHS    niMODipine (NIMOTOP) capsule 60 mg  60 mg Oral Q4H    acetaminophen (TYLENOL) tablet 650 mg  650 mg Oral Q4H PRN    senna-docusate (PERICOLACE) 8.6-50 mg per tablet 2 Tab  2 Tab Oral QHS    NUTRITIONAL SUPPORT ELECTROLYTE PRN ORDERS   Does Not Apply PRN    niCARdipine (CARDENE) 25 mg in 0.9% sodium chloride (MBP/ADV) 250 mL infusion  0-15 mg/hr IntraVENous TITRATE    magnesium sulfate 2 g/50 ml IVPB (premix or compounded)  2 g IntraVENous DAILY PRN    magnesium oxide (MAG-OX) tablet 400 mg  400 mg Oral BID    magnesium sulfate 4 g/100 mL IVPB  4 g IntraVENous DAILY PRN    ketorolac (TORADOL) injection 15 mg  15 mg IntraVENous Q6H PRN    fentaNYL citrate (PF) injection 50 mcg  50 mcg IntraVENous Q2H PRN    0.9% sodium chloride infusion  75 mL/hr IntraVENous CONTINUOUS       Objective:     Vitals:    01/09/21 0745 01/09/21 0800 01/09/21 0811 01/09/21 0815   BP: 117/65 112/64 112/64 125/67   Pulse: 86 84 92 93   Resp: 16 17  24   Temp:       SpO2: 94% 95%  93%   Weight: Height:             Intake and Output:  Current Shift: No intake/output data recorded. Last 24 hr: 01/08 0701 - 01/09 0700  In: 2546.3 [P.O.:300; I.V.:2246.3]  Out: 2070 [Urine:2070]         Physical Exam:   General:  Alert, cooperative, no distress   Eyes:  PERRL+3, midline   Neck: Supple, symmetrical, trachea midline, no adenopathy, thyroid: no enlargment/tenderness/nodules, no carotid bruit and no JVD. Lungs:   Clear to auscultation bilaterally. Heart:  Regular rate and rhythm, S1, S2 normal, no murmur, click, rub or gallop. Abdomen:   Soft, non-tender. Bowel sounds normal. No masses,  No organomegaly. Complains of burning with urination. Extremities: Extremities normal, atraumatic, no cyanosis or edema. Pulses: 2+ and symmetric all extremities. Skin: Skin color, texture, turgor normal. No rashes or lesions   Neurologic: CNII-XII intact. Normal strength, sensation and reflexes throughout. Lab/Data Review:    Recent Results (from the past 12 hour(s))   CBC WITH AUTOMATED DIFF    Collection Time: 01/09/21  3:23 AM   Result Value Ref Range    WBC 5.9 4.3 - 11.1 K/uL    RBC 3.48 (L) 4.23 - 5.6 M/uL    HGB 11.0 (L) 13.6 - 17.2 g/dL    HCT 34.1 (L) 41.1 - 50.3 %    MCV 98.0 (H) 79.6 - 97.8 FL    MCH 31.6 26.1 - 32.9 PG    MCHC 32.3 31.4 - 35.0 g/dL    RDW 12.4 11.9 - 14.6 %    PLATELET 690 910 - 177 K/uL    MPV 10.0 9.4 - 12.3 FL    ABSOLUTE NRBC 0.00 0.0 - 0.2 K/uL    DF AUTOMATED      NEUTROPHILS 44 43 - 78 %    LYMPHOCYTES 37 13 - 44 %    MONOCYTES 10 4.0 - 12.0 %    EOSINOPHILS 8 (H) 0.5 - 7.8 %    BASOPHILS 1 0.0 - 2.0 %    IMMATURE GRANULOCYTES 0 0.0 - 5.0 %    ABS. NEUTROPHILS 2.6 1.7 - 8.2 K/UL    ABS. LYMPHOCYTES 2.2 0.5 - 4.6 K/UL    ABS. MONOCYTES 0.6 0.1 - 1.3 K/UL    ABS. EOSINOPHILS 0.5 0.0 - 0.8 K/UL    ABS. BASOPHILS 0.0 0.0 - 0.2 K/UL    ABS. IMM.  GRANS. 0.0 0.0 - 0.5 K/UL   METABOLIC PANEL, BASIC    Collection Time: 01/09/21  3:23 AM   Result Value Ref Range    Sodium 141 138 - 145 mmol/L    Potassium 3.5 3.5 - 5.1 mmol/L    Chloride 112 (H) 98 - 107 mmol/L    CO2 26 21 - 32 mmol/L    Anion gap 3 (L) 7 - 16 mmol/L    Glucose 105 (H) 65 - 100 mg/dL    BUN 11 8 - 23 MG/DL    Creatinine 1.31 0.8 - 1.5 MG/DL    GFR est AA >60 >60 ml/min/1.73m2    GFR est non-AA 56 (L) >60 ml/min/1.73m2    Calcium 8.0 (L) 8.3 - 10.4 MG/DL   MAGNESIUM    Collection Time: 01/09/21  3:23 AM   Result Value Ref Range    Magnesium 2.3 1.8 - 2.4 mg/dL       Assessment/ Plan:     Principal Problem:    SAH (subarachnoid hemorrhage) ([address] (1/5/2021)        NEURO: SAH and in ICU under [facility] protocol. Will do cerebral angiogram today. Daughter, Reyna Clemente, has been updated and is NOK. Consult PT/OT/ST/Rehab as warranted. Angio neg 1-6-21. Will repeat next week. TCD normal. Looks good this am. Will get OOB and ambulate in unit today. Pt with mild ha this am, but overall looks and feels good. RESP:RA and tolerating well. CV:MAP 70-90. cardene prn, trop mildly elevated, will trend . SCD/lovenox. HEME: HH:11/34   NEPH:bun/cr: 11/1.3  GI:Reg diet. Pepcid, senna. ID:98.7. Rocephin: D3/5. Pyridium D3/3.     LINES:IV, PICC,   Signed By: Reji Garrison NP     January 9, 2021

## 2021-01-09 NOTE — PROCEDURES
Transcranial Doppler    Transcranial Doppler studies were obtained on this patient for the evaluation of intracranial hemodynamics status post subarachnoid hemorrhage. Insonation was performed via the trapped temporal window and via the foramen magnum window for the posterior fossa. Studies are physiologic in the left and right middle anterior and posterior cerebral arteries and in the basilar artery. The left and right Lindegaard ratios are 1.30 and 1.34 respectively.     Impression    Normal TCD

## 2021-01-09 NOTE — PROCEDURES
TCD INTRACRANIAL ARTERIES COMPLETE [[id number]] ordered by Jeremiah Ignacio NP at 01/06/21 1544               []Hide copied text    []Leroy for details  Transcranial Doppler     Transcranial Doppler studies were obtained on this patient for the evaluation of intracranial hemodynamics status post subarachnoid hemorrhage.     Insonation was performed via the transtemporal and foramen magnum window utilizing a 2 MHz probe.     Flow in the a left middle anterior and posterior cerebral arteries is physiologic. Flow in the right middle anterior and posterior cerebral arteries is physiologic.   Flow velocities in the vertebrobasilar basilar system is antegrade vertebral flow bilaterally and normal physiologic basilar artery flow.     Impression     No evidence of intracranial vascular spasm is identified on this transcranial Doppler evaluation and no significant changes noted from the study of 1/8/2021 Purse String (Simple) Text: Given the location of the defect and the characteristics of the surrounding skin a purse string closure was deemed most appropriate.  Undermining was performed circumfirentially around the surgical defect.  A purse string suture was then placed and tightened.

## 2021-01-10 ENCOUNTER — APPOINTMENT (OUTPATIENT)
Dept: GENERAL RADIOLOGY | Age: 81
DRG: 066 | End: 2021-01-10
Attending: NEUROLOGICAL SURGERY
Payer: MEDICARE

## 2021-01-10 LAB
ANION GAP SERPL CALC-SCNC: 5 MMOL/L (ref 7–16)
BASOPHILS # BLD: 0.1 K/UL (ref 0–0.2)
BASOPHILS NFR BLD: 1 % (ref 0–2)
BUN SERPL-MCNC: 9 MG/DL (ref 8–23)
CALCIUM SERPL-MCNC: 7.6 MG/DL (ref 8.3–10.4)
CHLORIDE SERPL-SCNC: 113 MMOL/L (ref 98–107)
CO2 SERPL-SCNC: 24 MMOL/L (ref 21–32)
CREAT SERPL-MCNC: 1.39 MG/DL (ref 0.8–1.5)
DIFFERENTIAL METHOD BLD: ABNORMAL
EOSINOPHIL # BLD: 0.5 K/UL (ref 0–0.8)
EOSINOPHIL NFR BLD: 10 % (ref 0.5–7.8)
ERYTHROCYTE [DISTWIDTH] IN BLOOD BY AUTOMATED COUNT: 12.4 % (ref 11.9–14.6)
GLUCOSE SERPL-MCNC: 93 MG/DL (ref 65–100)
HCT VFR BLD AUTO: 31.7 % (ref 41.1–50.3)
HGB BLD-MCNC: 10.2 G/DL (ref 13.6–17.2)
IMM GRANULOCYTES # BLD AUTO: 0 K/UL (ref 0–0.5)
IMM GRANULOCYTES NFR BLD AUTO: 0 % (ref 0–5)
LYMPHOCYTES # BLD: 2.1 K/UL (ref 0.5–4.6)
LYMPHOCYTES NFR BLD: 40 % (ref 13–44)
MAGNESIUM SERPL-MCNC: 2.5 MG/DL (ref 1.8–2.4)
MCH RBC QN AUTO: 31.2 PG (ref 26.1–32.9)
MCHC RBC AUTO-ENTMCNC: 32.2 G/DL (ref 31.4–35)
MCV RBC AUTO: 96.9 FL (ref 79.6–97.8)
MONOCYTES # BLD: 0.6 K/UL (ref 0.1–1.3)
MONOCYTES NFR BLD: 11 % (ref 4–12)
NEUTS SEG # BLD: 2 K/UL (ref 1.7–8.2)
NEUTS SEG NFR BLD: 38 % (ref 43–78)
NRBC # BLD: 0 K/UL (ref 0–0.2)
PLATELET # BLD AUTO: 172 K/UL (ref 150–450)
PMV BLD AUTO: 10 FL (ref 9.4–12.3)
POTASSIUM SERPL-SCNC: 3.5 MMOL/L (ref 3.5–5.1)
RBC # BLD AUTO: 3.27 M/UL (ref 4.23–5.6)
SODIUM SERPL-SCNC: 142 MMOL/L (ref 136–145)
WBC # BLD AUTO: 5.2 K/UL (ref 4.3–11.1)

## 2021-01-10 PROCEDURE — 83735 ASSAY OF MAGNESIUM: CPT

## 2021-01-10 PROCEDURE — 74011250637 HC RX REV CODE- 250/637: Performed by: NURSE PRACTITIONER

## 2021-01-10 PROCEDURE — 65610000006 HC RM INTENSIVE CARE

## 2021-01-10 PROCEDURE — 74011250636 HC RX REV CODE- 250/636: Performed by: NEUROLOGICAL SURGERY

## 2021-01-10 PROCEDURE — 74011000258 HC RX REV CODE- 258: Performed by: NEUROLOGICAL SURGERY

## 2021-01-10 PROCEDURE — 2709999900 HC NON-CHARGEABLE SUPPLY

## 2021-01-10 PROCEDURE — 74011000250 HC RX REV CODE- 250: Performed by: NURSE PRACTITIONER

## 2021-01-10 PROCEDURE — 74011250637 HC RX REV CODE- 250/637: Performed by: NEUROLOGICAL SURGERY

## 2021-01-10 PROCEDURE — 71045 X-RAY EXAM CHEST 1 VIEW: CPT

## 2021-01-10 PROCEDURE — 99232 SBSQ HOSP IP/OBS MODERATE 35: CPT | Performed by: NEUROLOGICAL SURGERY

## 2021-01-10 PROCEDURE — 74011250636 HC RX REV CODE- 250/636: Performed by: NURSE PRACTITIONER

## 2021-01-10 PROCEDURE — 74011000250 HC RX REV CODE- 250: Performed by: NEUROLOGICAL SURGERY

## 2021-01-10 PROCEDURE — 80048 BASIC METABOLIC PNL TOTAL CA: CPT

## 2021-01-10 PROCEDURE — 74011000258 HC RX REV CODE- 258: Performed by: NURSE PRACTITIONER

## 2021-01-10 PROCEDURE — 85025 COMPLETE CBC W/AUTO DIFF WBC: CPT

## 2021-01-10 RX ORDER — KETOROLAC TROMETHAMINE 30 MG/ML
15 INJECTION, SOLUTION INTRAMUSCULAR; INTRAVENOUS
Status: DISCONTINUED | OUTPATIENT
Start: 2021-01-10 | End: 2021-01-13 | Stop reason: HOSPADM

## 2021-01-10 RX ORDER — NIMODIPINE 30 MG/1
30 CAPSULE, LIQUID FILLED ORAL EVERY 4 HOURS
Status: DISCONTINUED | OUTPATIENT
Start: 2021-01-10 | End: 2021-01-12

## 2021-01-10 RX ADMIN — NIMODIPINE 30 MG: 30 CAPSULE, LIQUID FILLED ORAL at 08:06

## 2021-01-10 RX ADMIN — NIMODIPINE 30 MG: 30 CAPSULE, LIQUID FILLED ORAL at 23:05

## 2021-01-10 RX ADMIN — ACETAMINOPHEN 650 MG: 325 TABLET, FILM COATED ORAL at 14:36

## 2021-01-10 RX ADMIN — SODIUM CHLORIDE 5 MG/HR: 900 INJECTION, SOLUTION INTRAVENOUS at 09:04

## 2021-01-10 RX ADMIN — NICARDIPINE HYDROCHLORIDE 5 MG/HR: 2.5 INJECTION, SOLUTION INTRAVENOUS at 14:32

## 2021-01-10 RX ADMIN — FAMOTIDINE 20 MG: 20 TABLET, FILM COATED ORAL at 08:05

## 2021-01-10 RX ADMIN — MAGNESIUM GLUCONATE 500 MG ORAL TABLET 400 MG: 500 TABLET ORAL at 08:06

## 2021-01-10 RX ADMIN — NIMODIPINE 30 MG: 30 CAPSULE, LIQUID FILLED ORAL at 03:42

## 2021-01-10 RX ADMIN — NICARDIPINE HYDROCHLORIDE 10 MG/HR: 2.5 INJECTION, SOLUTION INTRAVENOUS at 22:27

## 2021-01-10 RX ADMIN — SODIUM CHLORIDE 1000 ML: 900 INJECTION, SOLUTION INTRAVENOUS at 01:04

## 2021-01-10 RX ADMIN — ENOXAPARIN SODIUM 30 MG: 30 INJECTION SUBCUTANEOUS at 11:02

## 2021-01-10 RX ADMIN — NIMODIPINE 30 MG: 30 CAPSULE, LIQUID FILLED ORAL at 20:00

## 2021-01-10 RX ADMIN — TAMSULOSIN HYDROCHLORIDE 0.4 MG: 0.4 CAPSULE ORAL at 08:06

## 2021-01-10 RX ADMIN — MAGNESIUM GLUCONATE 500 MG ORAL TABLET 400 MG: 500 TABLET ORAL at 18:02

## 2021-01-10 RX ADMIN — LATANOPROST 1 DROP: 50 SOLUTION OPHTHALMIC at 22:00

## 2021-01-10 RX ADMIN — SODIUM CHLORIDE 1000 ML: 900 INJECTION, SOLUTION INTRAVENOUS at 01:53

## 2021-01-10 RX ADMIN — DOCUSATE SODIUM 50 MG AND SENNOSIDES 8.6 MG 2 TABLET: 8.6; 5 TABLET, FILM COATED ORAL at 21:17

## 2021-01-10 RX ADMIN — ACETAMINOPHEN 650 MG: 325 TABLET, FILM COATED ORAL at 10:29

## 2021-01-10 RX ADMIN — VALSARTAN: 80 TABLET ORAL at 08:05

## 2021-01-10 RX ADMIN — NIMODIPINE 30 MG: 30 CAPSULE, LIQUID FILLED ORAL at 15:21

## 2021-01-10 RX ADMIN — ATORVASTATIN CALCIUM 40 MG: 40 TABLET, FILM COATED ORAL at 21:17

## 2021-01-10 RX ADMIN — URINARY PAIN RELIEF 95 MG: 95 TABLET ORAL at 08:05

## 2021-01-10 RX ADMIN — NIMODIPINE 30 MG: 30 CAPSULE, LIQUID FILLED ORAL at 11:02

## 2021-01-10 RX ADMIN — CEFTRIAXONE SODIUM 1 G: 1 INJECTION, POWDER, FOR SOLUTION INTRAMUSCULAR; INTRAVENOUS at 12:05

## 2021-01-10 NOTE — PROGRESS NOTES
Bedside and Verbal shift change report given to General Graciela rn (oncoming nurse) by Isha Lerner (offgoing nurse). Report included the following information SBAR, Kardex, Procedure Summary, Intake/Output and Cardiac Rhythm NSR.

## 2021-01-10 NOTE — PROGRESS NOTES
Pleasant resting in bed. Has concerns of headache worse with coughing and lack of appetite. Informed him that we will discuss with doctor this morning. No distress noted.

## 2021-01-10 NOTE — PROGRESS NOTES
Progress Note    Patient: Vincent Quick MRN: [id number]  SSN: [id number]    YOB: 1940  Age: 79 y.o. Sex: male      Admit Date: 1/5/2021    LOS: 5 days     Subjective:   Nothing acute overnight.     Current Facility-Administered Medications   Medication Dose Route Frequency    niMODipine (NIMOTOP) capsule 30 mg  30 mg Oral Q4H    niCARdipine (CARDENE) 50 mg in 0.9% sodium chloride 100 mL infusion  0-15 mg/hr IntraVENous TITRATE    ketorolac (TORADOL) injection 15 mg  15 mg IntraVENous Q6H PRN    cefTRIAXone (ROCEPHIN) 1 g in 0.9% sodium chloride (MBP/ADV) 50 mL MBP  1 g IntraVENous Q24H    enoxaparin (LOVENOX) injection 30 mg  30 mg SubCUTAneous Q24H    famotidine (PEPCID) tablet 20 mg  20 mg Oral DAILY    latanoprost (XALATAN) 0.005 % ophthalmic solution 1 Drop  1 Drop Both Eyes QHS    tamsulosin (FLOMAX) capsule 0.4 mg  0.4 mg Oral DAILY    valsartan/hydroCHLOROthiazide (DIOVAN HCT)  80/12.5 mg   Oral DAILY    ondansetron (ZOFRAN) injection 4 mg  4 mg IntraVENous Q6H PRN    atorvastatin (LIPITOR) tablet 40 mg  40 mg Oral QHS    acetaminophen (TYLENOL) tablet 650 mg  650 mg Oral Q4H PRN    senna-docusate (PERICOLACE) 8.6-50 mg per tablet 2 Tab  2 Tab Oral QHS    NUTRITIONAL SUPPORT ELECTROLYTE PRN ORDERS   Does Not Apply PRN    magnesium sulfate 2 g/50 ml IVPB (premix or compounded)  2 g IntraVENous DAILY PRN    magnesium oxide (MAG-OX) tablet 400 mg  400 mg Oral BID    magnesium sulfate 4 g/100 mL IVPB  4 g IntraVENous DAILY PRN    fentaNYL citrate (PF) injection 50 mcg  50 mcg IntraVENous Q2H PRN       Objective:     Vitals:    01/10/21 1330 01/10/21 1345 01/10/21 1401 01/10/21 1415   BP: 120/66 (!) 117/59 134/62 130/60   Pulse: 88 (!) 102 98 95   Resp: 24 24 24 23   Temp:       SpO2: 91% 97% 95% 96%   Weight:       Height:             Intake and Output:  Current Shift: 01/10 0701 - 01/10 1900  In: -   Out: 700 [Urine:700]  Last 24 hr: 01/09 0701 - 01/10 0700  In: 2811.5 [P.O.:480; I.V.:4311.6]  Out: 1600 [Urine:1600]    Physical Exam:   General:  Alert, cooperative, no distress, appears stated age. Eyes:  Conjunctivae/corneas clear. PERRL, EOMs intact. Neck: Supple, symmetrical, trachea midline   Lungs:   Clear to auscultation bilaterally. Heart:  Regular rate and rhythm, S1, S2 normal, no murmur, click, rub or gallop. Abdomen:   Soft, non-tender. Bowel sounds normal. No masses,  No organomegaly. Extremities: Extremities normal, atraumatic, no cyanosis or edema. Pulses: 2+ and symmetric all extremities. Skin: Skin color, texture, turgor normal. No rashes or lesions   Neurologic: CNII-XII intact. Normal strength, sensation throughout. Lab/Data Review:    Recent Results (from the past 12 hour(s))   CBC WITH AUTOMATED DIFF    Collection Time: 01/10/21  2:33 AM   Result Value Ref Range    WBC 5.2 4.3 - 11.1 K/uL    RBC 3.27 (L) 4.23 - 5.6 M/uL    HGB 10.2 (L) 13.6 - 17.2 g/dL    HCT 31.7 (L) 41.1 - 50.3 %    MCV 96.9 79.6 - 97.8 FL    MCH 31.2 26.1 - 32.9 PG    MCHC 32.2 31.4 - 35.0 g/dL    RDW 12.4 11.9 - 14.6 %    PLATELET 185 186 - 805 K/uL    MPV 10.0 9.4 - 12.3 FL    ABSOLUTE NRBC 0.00 0.0 - 0.2 K/uL    DF AUTOMATED      NEUTROPHILS 38 (L) 43 - 78 %    LYMPHOCYTES 40 13 - 44 %    MONOCYTES 11 4.0 - 12.0 %    EOSINOPHILS 10 (H) 0.5 - 7.8 %    BASOPHILS 1 0.0 - 2.0 %    IMMATURE GRANULOCYTES 0 0.0 - 5.0 %    ABS. NEUTROPHILS 2.0 1.7 - 8.2 K/UL    ABS. LYMPHOCYTES 2.1 0.5 - 4.6 K/UL    ABS. MONOCYTES 0.6 0.1 - 1.3 K/UL    ABS. EOSINOPHILS 0.5 0.0 - 0.8 K/UL    ABS. BASOPHILS 0.1 0.0 - 0.2 K/UL    ABS. IMM.  GRANS. 0.0 0.0 - 0.5 K/UL   METABOLIC PANEL, BASIC    Collection Time: 01/10/21  2:33 AM   Result Value Ref Range    Sodium 142 136 - 145 mmol/L    Potassium 3.5 3.5 - 5.1 mmol/L    Chloride 113 (H) 98 - 107 mmol/L    CO2 24 21 - 32 mmol/L    Anion gap 5 (L) 7 - 16 mmol/L    Glucose 93 65 - 100 mg/dL    BUN 9 8 - 23 MG/DL    Creatinine 1.39 0.8 - 1.5 MG/DL GFR est AA >60 >60 ml/min/1.73m2    GFR est non-AA 52 (L) >60 ml/min/1.73m2    Calcium 7.6 (L) 8.3 - 10.4 MG/DL   MAGNESIUM    Collection Time: 01/10/21  2:33 AM   Result Value Ref Range    Magnesium 2.5 (H) 1.8 - 2.4 mg/dL       Assessment/ Plan:     Principal Problem:    SAH (subarachnoid hemorrhage) ([address] (1/5/2021)      NEURO: Pt with SAH, no obvious source of vascular abnormality, but does not appear traumatic. Pt GCS 15. Admitted to ICU under [facility] protocol. Q1 hour neuro checks. MAP goal 70-90. TCDs have been normal. Cerebral angiogram showed no obvious vascular abnormality. MRI of the c-spine was negative for AVF. Patient remains intact. He will need a repeat cerebral angiogram on Tuesday. RESP: RA and tolerating well. Has some SOB with ambulation. Will get CXR today. CV:MAP 70-90. cardene prn, trop peaked at 19.2. 2D echo EF 60-65%. SCD/Lovenox. HEME: HH: 10/32   NEPH:bun/cr: 9/1.39. Fluids off. GI:Reg diet.  Pepcid, senna.   ID: Tm 100. Pt with hx of UTI, states burning with urination. Afebrile. Spasms. Empiric rocephin D4/5. LINES:IV, PICC.       Signed By: Yarely Lamb MD     January 10, 2021

## 2021-01-10 NOTE — PROGRESS NOTES
Bedside and Verbal shift change report given to Jass Brady (oncoming nurse) by Solange Tripp RN (offgoing nurse). Report included the following information SBAR, Kardex, Intake/Output, MAR, Recent Results, Med Rec Status, Cardiac Rhythm SR and Dual Neuro Assessment.

## 2021-01-11 LAB
ANION GAP SERPL CALC-SCNC: 5 MMOL/L (ref 7–16)
APPEARANCE UR: CLEAR
BACTERIA URNS QL MICRO: 0 /HPF
BASOPHILS # BLD: 0.1 K/UL (ref 0–0.2)
BASOPHILS NFR BLD: 1 % (ref 0–2)
BILIRUB UR QL: NEGATIVE
BUN SERPL-MCNC: 10 MG/DL (ref 8–23)
CALCIUM SERPL-MCNC: 8.2 MG/DL (ref 8.3–10.4)
CASTS URNS QL MICRO: ABNORMAL /LPF
CHLORIDE SERPL-SCNC: 109 MMOL/L (ref 98–107)
CO2 SERPL-SCNC: 24 MMOL/L (ref 21–32)
COLOR UR: YELLOW
CREAT SERPL-MCNC: 1.44 MG/DL (ref 0.8–1.5)
DIFFERENTIAL METHOD BLD: ABNORMAL
EOSINOPHIL # BLD: 0.5 K/UL (ref 0–0.8)
EOSINOPHIL NFR BLD: 7 % (ref 0.5–7.8)
EPI CELLS #/AREA URNS HPF: ABNORMAL /HPF
ERYTHROCYTE [DISTWIDTH] IN BLOOD BY AUTOMATED COUNT: 12.4 % (ref 11.9–14.6)
GLUCOSE SERPL-MCNC: 101 MG/DL (ref 65–100)
GLUCOSE UR STRIP.AUTO-MCNC: NEGATIVE MG/DL
HCT VFR BLD AUTO: 33.2 % (ref 41.1–50.3)
HGB BLD-MCNC: 10.8 G/DL (ref 13.6–17.2)
HGB UR QL STRIP: ABNORMAL
IMM GRANULOCYTES # BLD AUTO: 0 K/UL (ref 0–0.5)
IMM GRANULOCYTES NFR BLD AUTO: 0 % (ref 0–5)
KETONES UR QL STRIP.AUTO: NEGATIVE MG/DL
LEUKOCYTE ESTERASE UR QL STRIP.AUTO: NEGATIVE
LYMPHOCYTES # BLD: 2.1 K/UL (ref 0.5–4.6)
LYMPHOCYTES NFR BLD: 30 % (ref 13–44)
MAGNESIUM SERPL-MCNC: 2.1 MG/DL (ref 1.8–2.4)
MCH RBC QN AUTO: 31.5 PG (ref 26.1–32.9)
MCHC RBC AUTO-ENTMCNC: 32.5 G/DL (ref 31.4–35)
MCV RBC AUTO: 96.8 FL (ref 79.6–97.8)
MONOCYTES # BLD: 0.6 K/UL (ref 0.1–1.3)
MONOCYTES NFR BLD: 9 % (ref 4–12)
NEUTS SEG # BLD: 3.6 K/UL (ref 1.7–8.2)
NEUTS SEG NFR BLD: 52 % (ref 43–78)
NITRITE UR QL STRIP.AUTO: NEGATIVE
NRBC # BLD: 0 K/UL (ref 0–0.2)
PH UR STRIP: 6.5 [PH] (ref 5–9)
PLATELET # BLD AUTO: 194 K/UL (ref 150–450)
PMV BLD AUTO: 10.1 FL (ref 9.4–12.3)
POTASSIUM SERPL-SCNC: 3.6 MMOL/L (ref 3.5–5.1)
PROT UR STRIP-MCNC: NEGATIVE MG/DL
RBC # BLD AUTO: 3.43 M/UL (ref 4.23–5.6)
RBC #/AREA URNS HPF: >100 /HPF
SODIUM SERPL-SCNC: 138 MMOL/L (ref 136–145)
SP GR UR REFRACTOMETRY: 1.02 (ref 1–1.02)
UROBILINOGEN UR QL STRIP.AUTO: 1 EU/DL (ref 0.2–1)
WBC # BLD AUTO: 6.9 K/UL (ref 4.3–11.1)
WBC URNS QL MICRO: ABNORMAL /HPF

## 2021-01-11 PROCEDURE — 87040 BLOOD CULTURE FOR BACTERIA: CPT

## 2021-01-11 PROCEDURE — 74011000250 HC RX REV CODE- 250: Performed by: NEUROLOGICAL SURGERY

## 2021-01-11 PROCEDURE — 74011250637 HC RX REV CODE- 250/637: Performed by: NEUROLOGICAL SURGERY

## 2021-01-11 PROCEDURE — 93886 INTRACRANIAL COMPLETE STUDY: CPT | Performed by: PSYCHIATRY & NEUROLOGY

## 2021-01-11 PROCEDURE — 74011250637 HC RX REV CODE- 250/637: Performed by: NURSE PRACTITIONER

## 2021-01-11 PROCEDURE — 81001 URINALYSIS AUTO W/SCOPE: CPT

## 2021-01-11 PROCEDURE — 2709999900 HC NON-CHARGEABLE SUPPLY

## 2021-01-11 PROCEDURE — 74011000258 HC RX REV CODE- 258: Performed by: NURSE PRACTITIONER

## 2021-01-11 PROCEDURE — 74011250636 HC RX REV CODE- 250/636: Performed by: NURSE PRACTITIONER

## 2021-01-11 PROCEDURE — 83735 ASSAY OF MAGNESIUM: CPT

## 2021-01-11 PROCEDURE — 74011000250 HC RX REV CODE- 250: Performed by: NURSE PRACTITIONER

## 2021-01-11 PROCEDURE — 87086 URINE CULTURE/COLONY COUNT: CPT

## 2021-01-11 PROCEDURE — 85025 COMPLETE CBC W/AUTO DIFF WBC: CPT

## 2021-01-11 PROCEDURE — 97530 THERAPEUTIC ACTIVITIES: CPT

## 2021-01-11 PROCEDURE — 80048 BASIC METABOLIC PNL TOTAL CA: CPT

## 2021-01-11 PROCEDURE — 65610000006 HC RM INTENSIVE CARE

## 2021-01-11 PROCEDURE — 99232 SBSQ HOSP IP/OBS MODERATE 35: CPT | Performed by: NEUROLOGICAL SURGERY

## 2021-01-11 PROCEDURE — 74011000258 HC RX REV CODE- 258: Performed by: NEUROLOGICAL SURGERY

## 2021-01-11 PROCEDURE — 93886 INTRACRANIAL COMPLETE STUDY: CPT

## 2021-01-11 PROCEDURE — 36415 COLL VENOUS BLD VENIPUNCTURE: CPT

## 2021-01-11 RX ORDER — ACETAMINOPHEN 325 MG/1
650 TABLET ORAL
Status: DISCONTINUED | OUTPATIENT
Start: 2021-01-11 | End: 2021-01-12 | Stop reason: SDUPTHER

## 2021-01-11 RX ORDER — FUROSEMIDE 10 MG/ML
40 INJECTION INTRAMUSCULAR; INTRAVENOUS ONCE
Status: COMPLETED | OUTPATIENT
Start: 2021-01-11 | End: 2021-01-11

## 2021-01-11 RX ADMIN — FAMOTIDINE 20 MG: 20 TABLET, FILM COATED ORAL at 08:45

## 2021-01-11 RX ADMIN — NIMODIPINE 30 MG: 30 CAPSULE, LIQUID FILLED ORAL at 12:07

## 2021-01-11 RX ADMIN — NIMODIPINE 30 MG: 30 CAPSULE, LIQUID FILLED ORAL at 15:43

## 2021-01-11 RX ADMIN — ALTEPLASE 1 MG: 2.2 INJECTION, POWDER, LYOPHILIZED, FOR SOLUTION INTRAVENOUS at 21:20

## 2021-01-11 RX ADMIN — NIMODIPINE 30 MG: 30 CAPSULE, LIQUID FILLED ORAL at 20:38

## 2021-01-11 RX ADMIN — ACETAMINOPHEN 650 MG: 325 TABLET, FILM COATED ORAL at 03:17

## 2021-01-11 RX ADMIN — ATORVASTATIN CALCIUM 40 MG: 40 TABLET, FILM COATED ORAL at 21:20

## 2021-01-11 RX ADMIN — MAGNESIUM SULFATE HEPTAHYDRATE 2 G: 40 INJECTION, SOLUTION INTRAVENOUS at 05:57

## 2021-01-11 RX ADMIN — ACETAMINOPHEN 650 MG: 325 TABLET, FILM COATED ORAL at 21:19

## 2021-01-11 RX ADMIN — NICARDIPINE HYDROCHLORIDE 7.5 MG/HR: 2.5 INJECTION, SOLUTION INTRAVENOUS at 12:21

## 2021-01-11 RX ADMIN — ACETAMINOPHEN 650 MG: 325 TABLET, FILM COATED ORAL at 15:15

## 2021-01-11 RX ADMIN — ENOXAPARIN SODIUM 30 MG: 30 INJECTION SUBCUTANEOUS at 12:07

## 2021-01-11 RX ADMIN — FUROSEMIDE 40 MG: 10 INJECTION, SOLUTION INTRAMUSCULAR; INTRAVENOUS at 08:46

## 2021-01-11 RX ADMIN — CEFTRIAXONE SODIUM 1 G: 1 INJECTION, POWDER, FOR SOLUTION INTRAMUSCULAR; INTRAVENOUS at 12:07

## 2021-01-11 RX ADMIN — NIMODIPINE 30 MG: 30 CAPSULE, LIQUID FILLED ORAL at 08:45

## 2021-01-11 RX ADMIN — TAMSULOSIN HYDROCHLORIDE 0.4 MG: 0.4 CAPSULE ORAL at 08:45

## 2021-01-11 RX ADMIN — VALSARTAN: 80 TABLET ORAL at 08:45

## 2021-01-11 RX ADMIN — LATANOPROST 1 DROP: 50 SOLUTION OPHTHALMIC at 22:00

## 2021-01-11 RX ADMIN — NIMODIPINE 30 MG: 30 CAPSULE, LIQUID FILLED ORAL at 03:10

## 2021-01-11 RX ADMIN — NICARDIPINE HYDROCHLORIDE 7.5 MG/HR: 2.5 INJECTION, SOLUTION INTRAVENOUS at 22:49

## 2021-01-11 RX ADMIN — DOCUSATE SODIUM 50 MG AND SENNOSIDES 8.6 MG 2 TABLET: 8.6; 5 TABLET, FILM COATED ORAL at 21:20

## 2021-01-11 RX ADMIN — NICARDIPINE HYDROCHLORIDE 7.5 MG/HR: 2.5 INJECTION, SOLUTION INTRAVENOUS at 04:04

## 2021-01-11 NOTE — PROGRESS NOTES
ACUTE PHYSICAL THERAPY GOALS:  (Developed with and agreed upon by patient and/or caregiver. )  LTG:  (1.)Mr. Nancy Mcdaniel will move from supine to sit and sit to supine , scoot up and down and roll side to side in bed with INDEPENDENT within 7 treatment day(s). (2.)Mr. Nancy Mcdaniel will transfer from bed to chair and chair to bed with MODIFIED INDEPENDENCE using the least restrictive device within 7 treatment day(s). (3.)Mr. Nancy Mcdaniel will ambulate with MODIFIED INDEPENDENCE for 500 feet with the least restrictive device within 7 treatment day(s). GOAL MET 1/9/2021    (4.)Mr. Nancy Mcdaniel will participate in therapeutic activity/exercises x 25 minutes for increased strength within 7 treatment days. (5.)Mr. Nancy Mcdaniel will ascend and descend 2 stairs using 0 hand rail(s) with SUPERVISION to improve functional mobility and safety within 7 day(s). PHYSICAL THERAPY: Daily Note and AM Treatment Day # 3    Brionna Ramsay is a 79 y.o. male   PRIMARY DIAGNOSIS: SAH (subarachnoid hemorrhage) ([facility] [address]  SAH (subarachnoid hemorrhage) ([facility] [address] [I60.9]         ASSESSMENT:     REHAB RECOMMENDATIONS: CURRENT LEVEL OF FUNCTION:  (Most Recently Demonstrated)   Recommendation to date pending progress:  Setting:   No further skilled therapy   Equipment:    None Bed Mobility:   Modified Independent  Sit to Stand:   Modified Independent  Transfers:   Supervision  Gait/Mobility:   Supervision     ASSESSMENT:  Mr. Nancy Mcdaniel was agreeable to PT. He performed bed mobility with Wayne and STS with supervision. Pt ambulated pushing IV pole with supervision. He then performed sitting/standing activities with decreased activity tolerance. Pt progressed in mobility today. SUBJECTIVE:   Mr. Nancy Mcdaniel states, \"ok. \"    SOCIAL HISTORY/ LIVING ENVIRONMENT:   Home Environment: Private residence  # Steps to Enter: 2  One/Two Story Residence: Two story, live on 1st floor  Living Alone: Yes  Support Systems: Child(sonu)  OBJECTIVE:     PAIN: VITAL SIGNS: LINES/DRAINS:   Pre Treatment: Pain Screen  Pain Scale 1: Numeric (0 - 10)  Pain Intensity 1: 0  Post Treatment: none   none  O2 Device: Room air     MOBILITY: I Mod I S SBA CGA Min Mod Max Total  NT x2 Comments:   Bed Mobility    Rolling [] [] [] [] [] [] [] [] [] [] []    Supine to Sit [] [x] [] [] [] [] [] [] [] [] []    Scooting [] [] [] [] [] [] [] [] [] [] []    Sit to Supine [] [] [] [] [] [] [] [] [] [] []    Transfers    Sit to Stand [] [] [x] [] [] [] [] [] [] [] []    Bed to Chair [] [] [x] [] [] [] [] [] [] [] []    Stand to Sit [] [] [x] [] [] [] [] [] [] [] []    I=Independent, Mod I=Modified Independent, S=Supervision, SBA=Standby Assistance, CGA=Contact Guard Assistance,   Min=Minimal Assistance, Mod=Moderate Assistance, Max=Maximal Assistance, Total=Total Assistance, NT=Not Tested    GAIT: I Mod I S SBA CGA Min Mod Max Total  NT x2 Comments:   Level of Assistance [] [] [x] [] [] [] [] [] [] [] []    Distance 500    DME pushing IV pole     Gait Quality No significant deficits    Weightbearing  Status N/A     I=Independent, Mod I=Modified Independent, S=Supervision, SBA=Standby Assistance, CGA=Contact Guard Assistance,   Min=Minimal Assistance, Mod=Moderate Assistance, Max=Maximal Assistance, Total=Total Assistance, NT=Not Tested    PLAN:   FREQUENCY/DURATION: PT Plan of Care: 3 times/week for duration of hospital stay or until stated goals are met, whichever comes first.  TREATMENT:     TREATMENT:   ($$ Therapeutic Activity: 23-37 mins    )  Therapeutic Activity (23 Minutes): Therapeutic activity included Supine to Sit, Scooting, Transfer Training, Ambulation on level ground, Sitting balance  and Standing balance to improve functional Mobility, Strength and Activity tolerance.     AFTER TREATMENT POSITION/PRECAUTIONS:  Chair and Needs within reach    INTERDISCIPLINARY COLLABORATION:  RN/PCT and PT/PTA    TOTAL TREATMENT DURATION:  PT Patient Time In/Time Out  Time In: 1027  Time Out: 895 [address] [place]

## 2021-01-11 NOTE — PROGRESS NOTES
Care Management Interventions  PCP Verified by CM: Yes(Asif)  Mode of Transport at Discharge: Other (see comment)  Transition of Care Consult (CM Consult): Discharge Planning  Discharge Durable Medical Equipment: (none)  Current Support Network: Lives Alone, Own Home  Confirm Follow Up Transport: Self  The Plan for Transition of Care is Related to the Following Treatment Goals : HH, outpt rehab vs STR  Name of the Patient Representative Who was Provided with a Choice of Provider and Agrees with the Discharge Plan: Narda reyna of Choice List was Provided with Basic Dialogue that Supports the Patient's Individualized Plan of Care/Goals, Treatment Preferences and Shares the Quality Data Associated with the Providers?: Yes  The [employer] Information Provided?: (MCR/BCBS)  Discharge Location  Discharge Placement: Unable to determine at this time    CM saw pt today walking down the [place] with PT. PT has documented no skilled therapy needed. Pt to have repeat angiogram in the AM.  CM following with current DCP to home.

## 2021-01-11 NOTE — PROGRESS NOTES
Bedside and Verbal shift change report given to Shakila Jones rn (oncoming nurse) by Letty Blancas (offgoing nurse). Report included the following information SBAR and Kardex.

## 2021-01-11 NOTE — PROCEDURES
Transcranial Doppler    Transcranial Doppler studies were obtained on this patient for the evaluation of intracranial hemodynamics status post subarachnoid hemorrhage    Insonation was performed via the transtemporal window via the foramen magnum window for the flow posterior fossa. Flow velocities are normal throughout in the left and right anterior circulation.   Flow in the basilar arteries is normal.  There is no evidence of localized flow abnormality    Impression study continues to be unremarkable without evidence of intracranial vasospasm

## 2021-01-11 NOTE — PROGRESS NOTES
Bedside and Verbal shift change report given to Angelo Espinoza RN (oncoming nurse) by Berenice Krause RN (offgoing nurse). Report included the following information SBAR, Kardex, ED Summary, Intake/Output, MAR, Recent Results, Cardiac Rhythm NSR, Alarm Parameters  and Dual Neuro Assessment. Dual neuro and full NIH preformed with offgoing RN.

## 2021-01-11 NOTE — PROGRESS NOTES
A follow up visit was made to the patient. Emotional support, spiritual presence and   prayer were provided for the patient. He was sitting in his recliner. He asked that the  pray for unity.        Roberto Salvador, [address] [facility], [facility]

## 2021-01-11 NOTE — PROGRESS NOTES
Bedside and Verbal shift change report given to Jass Brady (oncoming nurse) by Mara López RN (offgoing nurse). Report included the following information SBAR, Intake/Output, MAR, Recent Results, Med Rec Status, Cardiac Rhythm SR and Alarm Parameters . Dual neuro assessment. assisted back to bed after ambulating to bathroom.  voided

## 2021-01-11 NOTE — PROGRESS NOTES
Progress Note    Patient: Rico Bernard MRN: [id number]  SSN: [id number]    YOB: 1940  Age: 79 y.o. Sex: male      Admit Date: 1/5/2021    LOS: 6 days     Subjective:   Nothing acute overnight. Current Facility-Administered Medications   Medication Dose Route Frequency    niMODipine (NIMOTOP) capsule 30 mg  30 mg Oral Q4H    niCARdipine (CARDENE) 50 mg in 0.9% sodium chloride 100 mL infusion  0-15 mg/hr IntraVENous TITRATE    ketorolac (TORADOL) injection 15 mg  15 mg IntraVENous Q6H PRN    cefTRIAXone (ROCEPHIN) 1 g in 0.9% sodium chloride (MBP/ADV) 50 mL MBP  1 g IntraVENous Q24H    enoxaparin (LOVENOX) injection 30 mg  30 mg SubCUTAneous Q24H    famotidine (PEPCID) tablet 20 mg  20 mg Oral DAILY    latanoprost (XALATAN) 0.005 % ophthalmic solution 1 Drop  1 Drop Both Eyes QHS    tamsulosin (FLOMAX) capsule 0.4 mg  0.4 mg Oral DAILY    valsartan/hydroCHLOROthiazide (DIOVAN HCT)  80/12.5 mg   Oral DAILY    ondansetron (ZOFRAN) injection 4 mg  4 mg IntraVENous Q6H PRN    atorvastatin (LIPITOR) tablet 40 mg  40 mg Oral QHS    acetaminophen (TYLENOL) tablet 650 mg  650 mg Oral Q4H PRN    senna-docusate (PERICOLACE) 8.6-50 mg per tablet 2 Tab  2 Tab Oral QHS    NUTRITIONAL SUPPORT ELECTROLYTE PRN ORDERS   Does Not Apply PRN    magnesium sulfate 2 g/50 ml IVPB (premix or compounded)  2 g IntraVENous DAILY PRN    magnesium sulfate 4 g/100 mL IVPB  4 g IntraVENous DAILY PRN    fentaNYL citrate (PF) injection 50 mcg  50 mcg IntraVENous Q2H PRN       Objective:     Vitals:    01/11/21 1100 01/11/21 1126 01/11/21 1138 01/11/21 1200   BP: 113/61 139/60 115/61    Pulse: (!) 108 (!) 108 94 100   Resp: 19 23 (!) 33 (!) 63   Temp: 98.5 °F (36.9 °C)      SpO2: 96% 97% 94% 95%   Weight:       Height:             Intake and Output:  Current Shift: 01/11 0701 - 01/11 1900  In: -   Out: 1200 [Urine:1200]  Last 24 hr: 01/10 0701 - 01/11 0700  In: 2568.8 [P.O.:1200;  I.V.:1368.8]  Out: 5386 [Urine:2375]    Physical Exam:   General:  Alert, cooperative, no distress   Eyes:   PERRL, EOMs intact. Neck: Supple, symmetrical, trachea midline   Lungs:   Clear to auscultation bilaterally. Heart:  Regular rate and rhythm, S1, S2 normal, no murmur, click, rub or gallop. Abdomen:   Soft, non-tender. Bowel sounds normal. No masses,  No organomegaly. Extremities: Extremities normal, atraumatic, no cyanosis or edema. Pulses: 2+ and symmetric all extremities. Skin: Skin color, texture, turgor normal. No rashes or lesions   Neurologic: CNII-XII intact. Normal strength, sensation throughout. Lab/Data Review:    Recent Results (from the past 12 hour(s))   CBC WITH AUTOMATED DIFF    Collection Time: 01/11/21  3:20 AM   Result Value Ref Range    WBC 6.9 4.3 - 11.1 K/uL    RBC 3.43 (L) 4.23 - 5.6 M/uL    HGB 10.8 (L) 13.6 - 17.2 g/dL    HCT 33.2 (L) 41.1 - 50.3 %    MCV 96.8 79.6 - 97.8 FL    MCH 31.5 26.1 - 32.9 PG    MCHC 32.5 31.4 - 35.0 g/dL    RDW 12.4 11.9 - 14.6 %    PLATELET 488 448 - 830 K/uL    MPV 10.1 9.4 - 12.3 FL    ABSOLUTE NRBC 0.00 0.0 - 0.2 K/uL    DF AUTOMATED      NEUTROPHILS 52 43 - 78 %    LYMPHOCYTES 30 13 - 44 %    MONOCYTES 9 4.0 - 12.0 %    EOSINOPHILS 7 0.5 - 7.8 %    BASOPHILS 1 0.0 - 2.0 %    IMMATURE GRANULOCYTES 0 0.0 - 5.0 %    ABS. NEUTROPHILS 3.6 1.7 - 8.2 K/UL    ABS. LYMPHOCYTES 2.1 0.5 - 4.6 K/UL    ABS. MONOCYTES 0.6 0.1 - 1.3 K/UL    ABS. EOSINOPHILS 0.5 0.0 - 0.8 K/UL    ABS. BASOPHILS 0.1 0.0 - 0.2 K/UL    ABS. IMM.  GRANS. 0.0 0.0 - 0.5 K/UL   METABOLIC PANEL, BASIC    Collection Time: 01/11/21  3:20 AM   Result Value Ref Range    Sodium 138 136 - 145 mmol/L    Potassium 3.6 3.5 - 5.1 mmol/L    Chloride 109 (H) 98 - 107 mmol/L    CO2 24 21 - 32 mmol/L    Anion gap 5 (L) 7 - 16 mmol/L    Glucose 101 (H) 65 - 100 mg/dL    BUN 10 8 - 23 MG/DL    Creatinine 1.44 0.8 - 1.5 MG/DL    GFR est AA >60 >60 ml/min/1.73m2    GFR est non-AA 50 (L) >60 ml/min/1.73m2    Calcium 8.2 (L) 8.3 - 10.4 MG/DL   MAGNESIUM    Collection Time: 01/11/21  3:20 AM   Result Value Ref Range    Magnesium 2.1 1.8 - 2.4 mg/dL       Assessment/ Plan:     Principal Problem:    SAH (subarachnoid hemorrhage) ([address] (1/5/2021)      NEURO: Pt with SAH, no obvious source of vascular abnormality, but does not appear traumatic. Pt GCS 15. Admitted to ICU under [facility] protocol. Q1 hour neuro checks. MAP goal 70-90. TCDs have been normal. Cerebral angiogram showed no obvious vascular abnormality. MRI of the c-spine was negative for AVF. Patient remains intact. Repeat cerebral angiogram in am.  RESP: RA and tolerating well. Lasix x1 today for SOB with ambulation, CXR mild edema. CV:MAP 70-90. cardene prn, trop peaked at 19.2. 2D echo EF 60-65%. SCD/Lovenox. HEME: HH: 10/33   NEPH:bun/cr: 10/1.44 Fluids off. GI:Reg diet.  Pepcid, senna.   ID: Tm 100.4. Rocephin D5/5, empiric dysuria. LINES:IV, PICC.       Signed By: Maryjane Fothergill, NP     January 11, 2021

## 2021-01-11 NOTE — PROGRESS NOTES
ACUTE OT GOALS:  (Developed with and agreed upon by patient and/or caregiver.)  (Developed with and agreed upon by patient and/or caregiver.)  1. Patient will complete lower body bathing and dressing with MOD I and adaptive equipment as needed. 2. Patient will complete toileting with MOD I.   3. Patient will tolerate 25 minutes of OT treatment with 1-2 rest breaks to increase activity tolerance for ADLs. 4. Patient will complete functional transfers with MOD I and adaptive equipment as needed. 5. Patient will complete functional mobility for household distances with MOD I and adaptive equipment as needed. 6. Patient will complete self-grooming while standing edge of sink with MOD I and adaptive equipment as needed.     Timeframe: 7 visits     OCCUPATIONAL THERAPY: Daily Note OT Treatment Day # 3    Rachelle Mitchell is a 79 y.o. male   PRIMARY DIAGNOSIS: SAH (subarachnoid hemorrhage) ([facility] [address]  SAH (subarachnoid hemorrhage) ([facility] [address] [I60.9]       [place] / [place] / Product Type: Medicare /   ASSESSMENT:     REHAB RECOMMENDATIONS: CURRENT LEVEL OF FUNCTION:  (Most Recently Demonstrated)   Recommendation to date pending progress:  Setting:   no anticipated OT needs at d/c  Equipment:    None Bathing:   Not tested  Dressing:   Not tested  Feeding/Grooming:   Not tested  Toileting:   Not tested  Functional Mobility:   Standby Assistance     ASSESSMENT:  Mr. Prince Phan presents sitting up in the chair upon arrival. Pt is alert and oriented x 4. Pt denies any complaints currently. Pt declines participating in ADL due to already completing most ADL for the day. Pt did work on functional mobility and standing tolerance for ADL tasks with SBA. Pt did require seated rest break due to fatigue. Pt left up in the chair at end of session with all needs at bedside. SUBJECTIVE:   Mr. Prince Phan states, \"I wiped down this morning already. \"    SOCIAL HISTORY/LIVING ENVIRONMENT:   Home Environment: Private residence  # Steps to Enter: 2  One/Two Story Residence: Two story, live on 1st floor  Living Alone: Yes  Support Systems: Child(sonu)    OBJECTIVE:     PAIN: VITAL SIGNS: LINES/DRAINS:   Pre Treatment: Pain Screen  Pain Scale 1: Numeric (0 - 10)  Pain Intensity 1: 0  Post Treatment: 0   IV  O2 Device: Room air     ACTIVITIES OF DAILY LIVING: I Mod I S SBA CGA Min Mod Max Total NT Comments   BASIC ADLs:              Bathing/ Showering [] [] [] [] [] [] [] [] [] [x]    Toileting [] [] [] [] [] [] [] [] [] [x]    Dressing [] [] [] [] [] [] [] [] [] [x]    Feeding [] [] [] [] [] [] [] [] [] [x]    Grooming [] [] [] [] [] [] [] [] [] [x]    Personal Device Care [] [] [] [] [] [] [] [] [] [x]    Functional Mobility [] [] [] [] [] [x] [] [] [] []    I=Independent, Mod I=Modified Independent, S=Supervision, SBA=Standby Assistance, CGA=Contact Guard Assistance,   Min=Minimal Assistance, Mod=Moderate Assistance, Max=Maximal Assistance, Total=Total Assistance, NT=Not Tested    MOBILITY: I Mod I S SBA CGA Min Mod Max Total  NT x2 Comments:   Supine to sit [] [] [] [] [] [] [] [] [] [x] []    Sit to supine [] [] [] [] [] [] [] [] [] [x] []    Sit to stand [] [] [] [x] [] [] [] [] [] [] []    Bed to chair [] [] [] [x] [] [] [] [] [] [] []    I=Independent, Mod I=Modified Independent, S=Supervision, SBA=Standby Assistance, CGA=Contact Guard Assistance,   Min=Minimal Assistance, Mod=Moderate Assistance, Max=Maximal Assistance, Total=Total Assistance, NT=Not Tested    PLAN:   FREQUENCY/DURATION: OT Plan of Care: 3 times/week for duration of hospital stay or until stated goals are met, whichever comes first.    TREATMENT:   TREATMENT:   ( $$ Therapeutic Activity: 23-37 mins   )  Therapeutic Activity (23 Minutes): Therapeutic activity included Transfer Training, Ambulation on level ground, Standing balance and standing tolerance to improve functional Mobility, Strength, Activity tolerance and ADLs.     AFTER TREATMENT POSITION/PRECAUTIONS:  Chair, Needs within reach and RN notified    INTERDISCIPLINARY COLLABORATION:  RN/PCT and OT/RG    TOTAL TREATMENT DURATION:  OT Patient Time In/Time Out  Time In: 1406  Time Out: Preeti 48, OT

## 2021-01-12 ENCOUNTER — APPOINTMENT (OUTPATIENT)
Dept: CT IMAGING | Age: 81
DRG: 066 | End: 2021-01-12
Attending: NEUROLOGICAL SURGERY
Payer: MEDICARE

## 2021-01-12 ENCOUNTER — APPOINTMENT (OUTPATIENT)
Dept: GENERAL RADIOLOGY | Age: 81
DRG: 066 | End: 2021-01-12
Attending: NURSE PRACTITIONER
Payer: MEDICARE

## 2021-01-12 ENCOUNTER — APPOINTMENT (OUTPATIENT)
Dept: OTHER | Age: 81
DRG: 066 | End: 2021-01-12
Attending: NURSE PRACTITIONER
Payer: MEDICARE

## 2021-01-12 LAB
ANION GAP SERPL CALC-SCNC: 5 MMOL/L (ref 7–16)
BASOPHILS # BLD: 0.1 K/UL (ref 0–0.2)
BASOPHILS NFR BLD: 1 % (ref 0–2)
BUN SERPL-MCNC: 13 MG/DL (ref 8–23)
CALCIUM SERPL-MCNC: 8.3 MG/DL (ref 8.3–10.4)
CHLORIDE SERPL-SCNC: 107 MMOL/L (ref 98–107)
CO2 SERPL-SCNC: 27 MMOL/L (ref 21–32)
CREAT SERPL-MCNC: 1.59 MG/DL (ref 0.8–1.5)
DIFFERENTIAL METHOD BLD: ABNORMAL
EOSINOPHIL # BLD: 0.4 K/UL (ref 0–0.8)
EOSINOPHIL NFR BLD: 7 % (ref 0.5–7.8)
ERYTHROCYTE [DISTWIDTH] IN BLOOD BY AUTOMATED COUNT: 12.5 % (ref 11.9–14.6)
GLUCOSE SERPL-MCNC: 105 MG/DL (ref 65–100)
HCT VFR BLD AUTO: 31.8 % (ref 41.1–50.3)
HGB BLD-MCNC: 10.5 G/DL (ref 13.6–17.2)
IMM GRANULOCYTES # BLD AUTO: 0 K/UL (ref 0–0.5)
IMM GRANULOCYTES NFR BLD AUTO: 0 % (ref 0–5)
LYMPHOCYTES # BLD: 2.4 K/UL (ref 0.5–4.6)
LYMPHOCYTES NFR BLD: 36 % (ref 13–44)
MAGNESIUM SERPL-MCNC: 2.3 MG/DL (ref 1.8–2.4)
MCH RBC QN AUTO: 31.3 PG (ref 26.1–32.9)
MCHC RBC AUTO-ENTMCNC: 33 G/DL (ref 31.4–35)
MCV RBC AUTO: 94.9 FL (ref 79.6–97.8)
MONOCYTES # BLD: 0.8 K/UL (ref 0.1–1.3)
MONOCYTES NFR BLD: 11 % (ref 4–12)
NEUTS SEG # BLD: 2.9 K/UL (ref 1.7–8.2)
NEUTS SEG NFR BLD: 45 % (ref 43–78)
NRBC # BLD: 0 K/UL (ref 0–0.2)
PLATELET # BLD AUTO: 196 K/UL (ref 150–450)
PMV BLD AUTO: 9.9 FL (ref 9.4–12.3)
POTASSIUM SERPL-SCNC: 3.3 MMOL/L (ref 3.5–5.1)
RBC # BLD AUTO: 3.35 M/UL (ref 4.23–5.6)
SODIUM SERPL-SCNC: 139 MMOL/L (ref 136–145)
WBC # BLD AUTO: 6.6 K/UL (ref 4.3–11.1)

## 2021-01-12 PROCEDURE — C1894 INTRO/SHEATH, NON-LASER: HCPCS

## 2021-01-12 PROCEDURE — 77030012468 HC VLV BLEEDBK CNTRL ABBT -B

## 2021-01-12 PROCEDURE — 99152 MOD SED SAME PHYS/QHP 5/>YRS: CPT

## 2021-01-12 PROCEDURE — 85025 COMPLETE CBC W/AUTO DIFF WBC: CPT

## 2021-01-12 PROCEDURE — 77030022017 HC DRSG HEMO QCLOT ZMED -A

## 2021-01-12 PROCEDURE — C1769 GUIDE WIRE: HCPCS

## 2021-01-12 PROCEDURE — B3141ZZ FLUOROSCOPY OF LEFT COMMON CAROTID ARTERY USING LOW OSMOLAR CONTRAST: ICD-10-PCS | Performed by: NEUROLOGICAL SURGERY

## 2021-01-12 PROCEDURE — 74011250637 HC RX REV CODE- 250/637: Performed by: NURSE PRACTITIONER

## 2021-01-12 PROCEDURE — C1725 CATH, TRANSLUMIN NON-LASER: HCPCS

## 2021-01-12 PROCEDURE — 71045 X-RAY EXAM CHEST 1 VIEW: CPT

## 2021-01-12 PROCEDURE — 36226 PLACE CATH VERTEBRAL ART: CPT | Performed by: NEUROLOGICAL SURGERY

## 2021-01-12 PROCEDURE — 74011000250 HC RX REV CODE- 250: Performed by: NEUROLOGICAL SURGERY

## 2021-01-12 PROCEDURE — C1887 CATHETER, GUIDING: HCPCS

## 2021-01-12 PROCEDURE — B31G1ZZ FLUOROSCOPY OF BILATERAL VERTEBRAL ARTERIES USING LOW OSMOLAR CONTRAST: ICD-10-PCS | Performed by: NEUROLOGICAL SURGERY

## 2021-01-12 PROCEDURE — 65610000006 HC RM INTENSIVE CARE

## 2021-01-12 PROCEDURE — 74011250636 HC RX REV CODE- 250/636: Performed by: NEUROLOGICAL SURGERY

## 2021-01-12 PROCEDURE — 99232 SBSQ HOSP IP/OBS MODERATE 35: CPT | Performed by: NEUROLOGICAL SURGERY

## 2021-01-12 PROCEDURE — 77030003629 HC NDL PERC VASC COOK -A

## 2021-01-12 PROCEDURE — 74174 CTA ABD&PLVS W/CONTRAST: CPT

## 2021-01-12 PROCEDURE — 74011250636 HC RX REV CODE- 250/636: Performed by: NURSE PRACTITIONER

## 2021-01-12 PROCEDURE — 80048 BASIC METABOLIC PNL TOTAL CA: CPT

## 2021-01-12 PROCEDURE — 74011000258 HC RX REV CODE- 258: Performed by: NEUROLOGICAL SURGERY

## 2021-01-12 PROCEDURE — 83735 ASSAY OF MAGNESIUM: CPT

## 2021-01-12 PROCEDURE — 36223 PLACE CATH CAROTID/INOM ART: CPT | Performed by: NEUROLOGICAL SURGERY

## 2021-01-12 PROCEDURE — 76937 US GUIDE VASCULAR ACCESS: CPT

## 2021-01-12 PROCEDURE — 74011000636 HC RX REV CODE- 636: Performed by: NEUROLOGICAL SURGERY

## 2021-01-12 PROCEDURE — 74011250637 HC RX REV CODE- 250/637: Performed by: NEUROLOGICAL SURGERY

## 2021-01-12 PROCEDURE — 2709999900 HC NON-CHARGEABLE SUPPLY

## 2021-01-12 RX ORDER — POTASSIUM CHLORIDE 14.9 MG/ML
20 INJECTION INTRAVENOUS
Status: COMPLETED | OUTPATIENT
Start: 2021-01-12 | End: 2021-01-12

## 2021-01-12 RX ORDER — FENTANYL CITRATE 50 UG/ML
25-100 INJECTION, SOLUTION INTRAMUSCULAR; INTRAVENOUS
Status: DISCONTINUED | OUTPATIENT
Start: 2021-01-12 | End: 2021-01-13 | Stop reason: HOSPADM

## 2021-01-12 RX ORDER — HEPARIN SODIUM 200 [USP'U]/100ML
1000-8000 INJECTION, SOLUTION INTRAVENOUS CONTINUOUS
Status: DISCONTINUED | OUTPATIENT
Start: 2021-01-12 | End: 2021-01-12

## 2021-01-12 RX ORDER — SODIUM CHLORIDE 0.9 % (FLUSH) 0.9 %
10 SYRINGE (ML) INJECTION
Status: COMPLETED | OUTPATIENT
Start: 2021-01-12 | End: 2021-01-12

## 2021-01-12 RX ORDER — ACETAMINOPHEN 325 MG/1
650 TABLET ORAL
Status: DISCONTINUED | OUTPATIENT
Start: 2021-01-12 | End: 2021-01-12

## 2021-01-12 RX ORDER — SODIUM CHLORIDE 9 MG/ML
500 INJECTION, SOLUTION INTRAVENOUS CONTINUOUS
Status: ACTIVE | OUTPATIENT
Start: 2021-01-12 | End: 2021-01-13

## 2021-01-12 RX ORDER — LIDOCAINE HYDROCHLORIDE 20 MG/ML
20-300 INJECTION, SOLUTION INFILTRATION; PERINEURAL
Status: DISCONTINUED | OUTPATIENT
Start: 2021-01-12 | End: 2021-01-13 | Stop reason: HOSPADM

## 2021-01-12 RX ORDER — AMLODIPINE BESYLATE 5 MG/1
5 TABLET ORAL DAILY
Status: DISCONTINUED | OUTPATIENT
Start: 2021-01-13 | End: 2021-01-12

## 2021-01-12 RX ORDER — ACETAMINOPHEN 325 MG/1
650 TABLET ORAL
Status: DISCONTINUED | OUTPATIENT
Start: 2021-01-12 | End: 2021-01-13 | Stop reason: HOSPADM

## 2021-01-12 RX ORDER — MIDAZOLAM HYDROCHLORIDE 1 MG/ML
.5-2 INJECTION, SOLUTION INTRAMUSCULAR; INTRAVENOUS
Status: DISCONTINUED | OUTPATIENT
Start: 2021-01-12 | End: 2021-01-13 | Stop reason: HOSPADM

## 2021-01-12 RX ORDER — ONDANSETRON 2 MG/ML
4 INJECTION INTRAMUSCULAR; INTRAVENOUS
Status: DISCONTINUED | OUTPATIENT
Start: 2021-01-12 | End: 2021-01-12

## 2021-01-12 RX ORDER — AMLODIPINE BESYLATE 5 MG/1
5 TABLET ORAL DAILY
Status: DISCONTINUED | OUTPATIENT
Start: 2021-01-12 | End: 2021-01-13 | Stop reason: HOSPADM

## 2021-01-12 RX ADMIN — MIDAZOLAM 1 MG: 1 INJECTION INTRAMUSCULAR; INTRAVENOUS at 11:23

## 2021-01-12 RX ADMIN — NICARDIPINE HYDROCHLORIDE 5 MG/HR: 2.5 INJECTION, SOLUTION INTRAVENOUS at 07:15

## 2021-01-12 RX ADMIN — NIMODIPINE 30 MG: 30 CAPSULE, LIQUID FILLED ORAL at 07:11

## 2021-01-12 RX ADMIN — POTASSIUM CHLORIDE 20 MEQ: 14.9 INJECTION, SOLUTION INTRAVENOUS at 04:52

## 2021-01-12 RX ADMIN — IOPAMIDOL 140 ML: 612 INJECTION, SOLUTION INTRAVENOUS at 12:32

## 2021-01-12 RX ADMIN — NIMODIPINE 30 MG: 30 CAPSULE, LIQUID FILLED ORAL at 00:17

## 2021-01-12 RX ADMIN — NIMODIPINE 30 MG: 30 CAPSULE, LIQUID FILLED ORAL at 11:00

## 2021-01-12 RX ADMIN — IOPAMIDOL 100 ML: 755 INJECTION, SOLUTION INTRAVENOUS at 13:17

## 2021-01-12 RX ADMIN — VALSARTAN: 80 TABLET ORAL at 08:04

## 2021-01-12 RX ADMIN — ACETAMINOPHEN 650 MG: 325 TABLET, FILM COATED ORAL at 11:00

## 2021-01-12 RX ADMIN — ATORVASTATIN CALCIUM 40 MG: 40 TABLET, FILM COATED ORAL at 22:02

## 2021-01-12 RX ADMIN — KETOROLAC TROMETHAMINE 15 MG: 30 INJECTION, SOLUTION INTRAMUSCULAR; INTRAVENOUS at 14:36

## 2021-01-12 RX ADMIN — LATANOPROST 1 DROP: 50 SOLUTION OPHTHALMIC at 22:13

## 2021-01-12 RX ADMIN — LIDOCAINE HYDROCHLORIDE 100 MG: 20 INJECTION, SOLUTION INFILTRATION; PERINEURAL at 11:29

## 2021-01-12 RX ADMIN — MAGNESIUM SULFATE HEPTAHYDRATE 2 G: 40 INJECTION, SOLUTION INTRAVENOUS at 04:52

## 2021-01-12 RX ADMIN — TAMSULOSIN HYDROCHLORIDE 0.4 MG: 0.4 CAPSULE ORAL at 08:04

## 2021-01-12 RX ADMIN — SODIUM CHLORIDE 100 ML: 900 INJECTION, SOLUTION INTRAVENOUS at 13:17

## 2021-01-12 RX ADMIN — POTASSIUM CHLORIDE 20 MEQ: 14.9 INJECTION, SOLUTION INTRAVENOUS at 07:12

## 2021-01-12 RX ADMIN — SODIUM CHLORIDE 1000 ML: 900 INJECTION, SOLUTION INTRAVENOUS at 09:39

## 2021-01-12 RX ADMIN — KETOROLAC TROMETHAMINE 15 MG: 30 INJECTION, SOLUTION INTRAMUSCULAR; INTRAVENOUS at 04:22

## 2021-01-12 RX ADMIN — Medication 10 ML: at 13:17

## 2021-01-12 RX ADMIN — DOCUSATE SODIUM 50 MG AND SENNOSIDES 8.6 MG 2 TABLET: 8.6; 5 TABLET, FILM COATED ORAL at 22:15

## 2021-01-12 RX ADMIN — ENOXAPARIN SODIUM 30 MG: 30 INJECTION SUBCUTANEOUS at 13:48

## 2021-01-12 RX ADMIN — SODIUM CHLORIDE 1000 ML: 900 INJECTION, SOLUTION INTRAVENOUS at 17:00

## 2021-01-12 RX ADMIN — KETOROLAC TROMETHAMINE 15 MG: 30 INJECTION, SOLUTION INTRAMUSCULAR; INTRAVENOUS at 20:13

## 2021-01-12 RX ADMIN — FAMOTIDINE 20 MG: 20 TABLET, FILM COATED ORAL at 08:04

## 2021-01-12 RX ADMIN — HEPARIN SODIUM 8000 UNITS: 200 INJECTION, SOLUTION INTRAVENOUS at 12:32

## 2021-01-12 RX ADMIN — FENTANYL CITRATE 50 MCG: 50 INJECTION, SOLUTION INTRAMUSCULAR; INTRAVENOUS at 11:23

## 2021-01-12 RX ADMIN — AMLODIPINE BESYLATE 5 MG: 5 TABLET ORAL at 13:48

## 2021-01-12 RX ADMIN — NIMODIPINE 30 MG: 30 CAPSULE, LIQUID FILLED ORAL at 04:22

## 2021-01-12 NOTE — PROGRESS NOTES
Progress Note    Patient: Jelani Alvarez MRN: [id number]  SSN: [id number]    YOB: 1940  Age: 79 y.o. Sex: male      Admit Date: 1/5/2021    LOS: 7 days     Subjective:   Nothing acute neuro changes. Pt did have temp 100.6: sent blood cx x2, UA/Cx. Neg thus far. Pt cxr this am normal.     Current Facility-Administered Medications   Medication Dose Route Frequency    sodium chloride 0.9 % bolus infusion 1,000 mL  1,000 mL IntraVENous ONCE    acetaminophen (TYLENOL) tablet 650 mg  650 mg Oral Q4H PRN    niMODipine (NIMOTOP) capsule 30 mg  30 mg Oral Q4H    niCARdipine (CARDENE) 50 mg in 0.9% sodium chloride 100 mL infusion  0-15 mg/hr IntraVENous TITRATE    ketorolac (TORADOL) injection 15 mg  15 mg IntraVENous Q6H PRN    enoxaparin (LOVENOX) injection 30 mg  30 mg SubCUTAneous Q24H    famotidine (PEPCID) tablet 20 mg  20 mg Oral DAILY    latanoprost (XALATAN) 0.005 % ophthalmic solution 1 Drop  1 Drop Both Eyes QHS    tamsulosin (FLOMAX) capsule 0.4 mg  0.4 mg Oral DAILY    valsartan/hydroCHLOROthiazide (DIOVAN HCT)  80/12.5 mg   Oral DAILY    ondansetron (ZOFRAN) injection 4 mg  4 mg IntraVENous Q6H PRN    atorvastatin (LIPITOR) tablet 40 mg  40 mg Oral QHS    senna-docusate (PERICOLACE) 8.6-50 mg per tablet 2 Tab  2 Tab Oral QHS    NUTRITIONAL SUPPORT ELECTROLYTE PRN ORDERS   Does Not Apply PRN    magnesium sulfate 2 g/50 ml IVPB (premix or compounded)  2 g IntraVENous DAILY PRN    magnesium sulfate 4 g/100 mL IVPB  4 g IntraVENous DAILY PRN    fentaNYL citrate (PF) injection 50 mcg  50 mcg IntraVENous Q2H PRN       Objective:     Vitals:    01/12/21 0830 01/12/21 0845 01/12/21 0859 01/12/21 0900   BP: 111/64 111/62 115/62    Pulse: 84 82  84   Resp: 20 24  26   Temp:       SpO2: 95% 95%  94%   Weight:       Height:             Intake and Output:  Current Shift: No intake/output data recorded.   Last 24 hr: 01/11 0701 - 01/12 0700  In: 1060.6 [P.O.:480; I.V.:580.6]  Out: 3180 [Urine:3180]         Physical Exam:   General:  Alert, cooperative, no distress   Eyes:   PERRL, EOMs intact. Neck: Supple, symmetrical, trachea midline   Lungs:   Clear to auscultation bilaterally. Heart:  Regular rate and rhythm, S1, S2 normal, no murmur, click, rub or gallop. Abdomen:   Soft, non-tender. Bowel sounds normal. No masses,  No organomegaly. Extremities: Extremities normal, atraumatic, no cyanosis or edema. Pulses: 2+ and symmetric all extremities. Skin: Skin color, texture, turgor normal. No rashes or lesions   Neurologic: CNII-XII intact. Normal strength, sensation throughout. Lab/Data Review:    Recent Results (from the past 12 hour(s))   CBC WITH AUTOMATED DIFF    Collection Time: 01/12/21  2:51 AM   Result Value Ref Range    WBC 6.6 4.3 - 11.1 K/uL    RBC 3.35 (L) 4.23 - 5.6 M/uL    HGB 10.5 (L) 13.6 - 17.2 g/dL    HCT 31.8 (L) 41.1 - 50.3 %    MCV 94.9 79.6 - 97.8 FL    MCH 31.3 26.1 - 32.9 PG    MCHC 33.0 31.4 - 35.0 g/dL    RDW 12.5 11.9 - 14.6 %    PLATELET 790 472 - 315 K/uL    MPV 9.9 9.4 - 12.3 FL    ABSOLUTE NRBC 0.00 0.0 - 0.2 K/uL    DF AUTOMATED      NEUTROPHILS 45 43 - 78 %    LYMPHOCYTES 36 13 - 44 %    MONOCYTES 11 4.0 - 12.0 %    EOSINOPHILS 7 0.5 - 7.8 %    BASOPHILS 1 0.0 - 2.0 %    IMMATURE GRANULOCYTES 0 0.0 - 5.0 %    ABS. NEUTROPHILS 2.9 1.7 - 8.2 K/UL    ABS. LYMPHOCYTES 2.4 0.5 - 4.6 K/UL    ABS. MONOCYTES 0.8 0.1 - 1.3 K/UL    ABS. EOSINOPHILS 0.4 0.0 - 0.8 K/UL    ABS. BASOPHILS 0.1 0.0 - 0.2 K/UL    ABS. IMM.  GRANS. 0.0 0.0 - 0.5 K/UL   METABOLIC PANEL, BASIC    Collection Time: 01/12/21  2:51 AM   Result Value Ref Range    Sodium 139 136 - 145 mmol/L    Potassium 3.3 (L) 3.5 - 5.1 mmol/L    Chloride 107 98 - 107 mmol/L    CO2 27 21 - 32 mmol/L    Anion gap 5 (L) 7 - 16 mmol/L    Glucose 105 (H) 65 - 100 mg/dL    BUN 13 8 - 23 MG/DL    Creatinine 1.59 (H) 0.8 - 1.5 MG/DL    GFR est AA 54 (L) >60 ml/min/1.73m2    GFR est non-AA 45 (L) >60 ml/min/1.73m2    Calcium 8.3 8.3 - 10.4 MG/DL   MAGNESIUM    Collection Time: 01/12/21  2:51 AM   Result Value Ref Range    Magnesium 2.3 1.8 - 2.4 mg/dL       Assessment/ Plan:     Principal Problem:    SAH (subarachnoid hemorrhage) ([facility] [address] (1/5/2021)      NEURO: Pt with SAH, no obvious source of vascular abnormality, but does not appear traumatic. Pt GCS 15. Admitted to ICU under [facility] protocol. Q1 hour neuro checks. MAP goal 70-90. TCDs have been normal. Cerebral angiogram showed no obvious vascular abnormality. MRI of the c-spine was negative for AVF. Patient remains intact. Repeat cerebral angiogram this am. Pt in nad. RESP: RA and tolerating well. CXR clear this am.   CV:MAP 70-90. cardene prn, trop peaked at 19.2. 2D echo EF 60-65%. SCD/Lovenox. HEME: HH: 10/31   NEPH:bun/cr: 13/1. 59. UA + hematuria, but neg otherwise. GI:Reg diet.  Pepcid, senna. NPO this am for angiogram.    ID: Tm 100.6. Rocephin D5/5, completed 1-11-21. Dysuria completed. LINES:IV, PICC.       Signed By: Federico Duran NP     January 12, 2021

## 2021-01-12 NOTE — PROGRESS NOTES
PT note: Patient on bedrest this afternoon following procedure. Will check back tomorrow.      AG CarusoT

## 2021-01-12 NOTE — PROGRESS NOTES
Bedside and Verbal shift change report given to Azul Bah RN (oncoming nurse) by Ozzie Adkins RN (offgoing nurse). Report included the following information SBAR, Kardex, Intake/Output and Recent Results.    Dual RN NIH completed at bedside

## 2021-01-12 NOTE — OP NOTES
Procedure: Cerebral angiogram  Surgeon: Dr. Sarah Oconnor  Pre-op Dx: [facility]  Post-op Dx: same  Anesthesia: Conscious sedation with fentanyl and versed  Complications: None  EBL: 10cc  Specimens: None  Findings: No vascular abnormality seen.

## 2021-01-12 NOTE — PROGRESS NOTES
A follow up visit was made to the patient. Emotional support, spiritual presence and   prayer were provided for the patient. He ws sleeping. A  contact card was left on his table.       Hilary Zeng, [address] [facility], [facility]

## 2021-01-12 NOTE — PROGRESS NOTES
Pt back from biplane with LEDA Early. Verbal report received from [facility]. Sheath went into R groin. Dual neuro, full NIH preformed, and vascular site checked with St. cat[facility] RN.

## 2021-01-13 ENCOUNTER — APPOINTMENT (OUTPATIENT)
Dept: CT IMAGING | Age: 81
DRG: 066 | End: 2021-01-13
Attending: NEUROLOGICAL SURGERY
Payer: MEDICARE

## 2021-01-13 LAB
ANION GAP SERPL CALC-SCNC: 6 MMOL/L (ref 7–16)
BASOPHILS # BLD: 0.1 K/UL (ref 0–0.2)
BASOPHILS NFR BLD: 1 % (ref 0–2)
BUN SERPL-MCNC: 13 MG/DL (ref 8–23)
CALCIUM SERPL-MCNC: 7.9 MG/DL (ref 8.3–10.4)
CHLORIDE SERPL-SCNC: 108 MMOL/L (ref 98–107)
CO2 SERPL-SCNC: 25 MMOL/L (ref 21–32)
COVID-19 RAPID TEST, COVR: NOT DETECTED
CREAT SERPL-MCNC: 1.44 MG/DL (ref 0.8–1.5)
DIFFERENTIAL METHOD BLD: ABNORMAL
EOSINOPHIL # BLD: 0.2 K/UL (ref 0–0.8)
EOSINOPHIL NFR BLD: 4 % (ref 0.5–7.8)
ERYTHROCYTE [DISTWIDTH] IN BLOOD BY AUTOMATED COUNT: 12.3 % (ref 11.9–14.6)
FLUAV AG NPH QL IA: NEGATIVE
FLUBV AG NPH QL IA: NEGATIVE
GLUCOSE SERPL-MCNC: 98 MG/DL (ref 65–100)
HCT VFR BLD AUTO: 30.7 % (ref 41.1–50.3)
HGB BLD-MCNC: 10.1 G/DL (ref 13.6–17.2)
IMM GRANULOCYTES # BLD AUTO: 0 K/UL (ref 0–0.5)
IMM GRANULOCYTES NFR BLD AUTO: 1 % (ref 0–5)
LYMPHOCYTES # BLD: 1.6 K/UL (ref 0.5–4.6)
LYMPHOCYTES NFR BLD: 28 % (ref 13–44)
MAGNESIUM SERPL-MCNC: 2.2 MG/DL (ref 1.8–2.4)
MCH RBC QN AUTO: 32 PG (ref 26.1–32.9)
MCHC RBC AUTO-ENTMCNC: 32.9 G/DL (ref 31.4–35)
MCV RBC AUTO: 97.2 FL (ref 79.6–97.8)
MONOCYTES # BLD: 0.5 K/UL (ref 0.1–1.3)
MONOCYTES NFR BLD: 9 % (ref 4–12)
NEUTS SEG # BLD: 3.4 K/UL (ref 1.7–8.2)
NEUTS SEG NFR BLD: 58 % (ref 43–78)
NRBC # BLD: 0.02 K/UL (ref 0–0.2)
PLATELET # BLD AUTO: 160 K/UL (ref 150–450)
PMV BLD AUTO: 9.9 FL (ref 9.4–12.3)
POTASSIUM SERPL-SCNC: 4 MMOL/L (ref 3.5–5.1)
RBC # BLD AUTO: 3.16 M/UL (ref 4.23–5.6)
SODIUM SERPL-SCNC: 139 MMOL/L (ref 136–145)
SOURCE, COVRS: NORMAL
SPECIMEN SOURCE: NORMAL
WBC # BLD AUTO: 5.9 K/UL (ref 4.3–11.1)

## 2021-01-13 PROCEDURE — 85025 COMPLETE CBC W/AUTO DIFF WBC: CPT

## 2021-01-13 PROCEDURE — 99238 HOSP IP/OBS DSCHRG MGMT 30/<: CPT | Performed by: NURSE PRACTITIONER

## 2021-01-13 PROCEDURE — 80048 BASIC METABOLIC PNL TOTAL CA: CPT

## 2021-01-13 PROCEDURE — 83735 ASSAY OF MAGNESIUM: CPT

## 2021-01-13 PROCEDURE — 87804 INFLUENZA ASSAY W/OPTIC: CPT

## 2021-01-13 PROCEDURE — 74011000258 HC RX REV CODE- 258: Performed by: NEUROLOGICAL SURGERY

## 2021-01-13 PROCEDURE — 97530 THERAPEUTIC ACTIVITIES: CPT

## 2021-01-13 PROCEDURE — 74011250637 HC RX REV CODE- 250/637: Performed by: NEUROLOGICAL SURGERY

## 2021-01-13 PROCEDURE — 74011250636 HC RX REV CODE- 250/636: Performed by: NURSE PRACTITIONER

## 2021-01-13 PROCEDURE — 74011250637 HC RX REV CODE- 250/637: Performed by: NURSE PRACTITIONER

## 2021-01-13 PROCEDURE — 74011000636 HC RX REV CODE- 636: Performed by: NEUROLOGICAL SURGERY

## 2021-01-13 PROCEDURE — 70496 CT ANGIOGRAPHY HEAD: CPT

## 2021-01-13 PROCEDURE — 87635 SARS-COV-2 COVID-19 AMP PRB: CPT

## 2021-01-13 RX ORDER — TRIPROLIDINE/PSEUDOEPHEDRINE 2.5MG-60MG
TABLET ORAL
Status: CANCELLED | OUTPATIENT
Start: 2021-01-13

## 2021-01-13 RX ORDER — BUTALBITAL, ACETAMINOPHEN AND CAFFEINE 50; 325; 40 MG/1; MG/1; MG/1
1 TABLET ORAL
Qty: 30 TAB | Refills: 1 | Status: SHIPPED | OUTPATIENT
Start: 2021-01-13

## 2021-01-13 RX ORDER — SODIUM CHLORIDE 0.9 % (FLUSH) 0.9 %
10 SYRINGE (ML) INJECTION
Status: COMPLETED | OUTPATIENT
Start: 2021-01-13 | End: 2021-01-13

## 2021-01-13 RX ORDER — BUTALBITAL, ACETAMINOPHEN AND CAFFEINE 50; 325; 40 MG/1; MG/1; MG/1
1 TABLET ORAL
Status: DISCONTINUED | OUTPATIENT
Start: 2021-01-13 | End: 2021-01-13 | Stop reason: HOSPADM

## 2021-01-13 RX ORDER — AMLODIPINE BESYLATE 5 MG/1
5 TABLET ORAL DAILY
Qty: 90 TAB | Refills: 1 | Status: SHIPPED | OUTPATIENT
Start: 2021-01-14

## 2021-01-13 RX ADMIN — FAMOTIDINE 20 MG: 20 TABLET, FILM COATED ORAL at 09:34

## 2021-01-13 RX ADMIN — ENOXAPARIN SODIUM 30 MG: 30 INJECTION SUBCUTANEOUS at 12:00

## 2021-01-13 RX ADMIN — AMLODIPINE BESYLATE 5 MG: 5 TABLET ORAL at 09:34

## 2021-01-13 RX ADMIN — VALSARTAN: 80 TABLET ORAL at 09:34

## 2021-01-13 RX ADMIN — ACETAMINOPHEN 650 MG: 325 TABLET, FILM COATED ORAL at 00:10

## 2021-01-13 RX ADMIN — ACETAMINOPHEN 650 MG: 325 TABLET, FILM COATED ORAL at 07:40

## 2021-01-13 RX ADMIN — TAMSULOSIN HYDROCHLORIDE 0.4 MG: 0.4 CAPSULE ORAL at 09:34

## 2021-01-13 RX ADMIN — SODIUM CHLORIDE 100 ML: 900 INJECTION, SOLUTION INTRAVENOUS at 04:00

## 2021-01-13 RX ADMIN — Medication 10 ML: at 04:00

## 2021-01-13 RX ADMIN — IOPAMIDOL 100 ML: 755 INJECTION, SOLUTION INTRAVENOUS at 04:00

## 2021-01-13 NOTE — DISCHARGE SUMMARY
Discharge Summary     Patient: Rachelle Mitchell MRN: [id number]  SSN: [id number]    YOB: 1940  Age: 79 y.o. Sex: male       Admit Date: 1/5/2021    Discharge Date: 1/13/2021      Admission Diagnoses: SAH (subarachnoid hemorrhage) ([facility] [I60.9]    Discharge Diagnoses:   Problem List as of 1/13/2021 Date Reviewed: 1/5/2021          Codes Class Noted - Resolved    * (Principal) SAH (subarachnoid hemorrhage) ([facility] ICD-10-CM: I60.9  ICD-9-CM: 223  1/5/2021 - Present               Discharge Condition: Good    Hospital Course: Mr. Pirnce Phan was admitted on 1-5-21 with acute SAH after he had a syncopal episode at home and complained of HA and was brought to the ED at [facility]. Rosy MIGUEL. He was admitted to our ICU under [facility] protocol. Mr. Prince Phan had two negative cerebral angiograms for any acute vascular abnormalities as the source of his [facility]: 1-6-21 and 1-12-21. He had a negative MRI Cspine for any AV fistula as well on 1-7-21. Mr. Prince Phan has been evaluated by our PT/OT/ST/Rehab teams and deemed not to need any inpt rehab at this time. He is ready for DC home today. He is aox3, has ambulated in room and in madera without difficulty and is tolerating po fluids/foods wo difficulty. During his admit he was treated with 5 days of rocephin 1gm IV for empiric dysuria, UA and UA cx negative. He continued to have a low grade temp and this am it was 101, a flu and rapid covid were sent and both are negative as well as neg cxr. He will dc home today with family, but he has been instructed to follow up with his PCP asap to ensure provider is aware of his recent admit for [facility] and that he had hematuria on this admit and will need follow up. Mr. Prince Phan is GCS 15/NIHSS 0 on dc today. He will follow up with DR. Manohar Espinosa in 4 weeks in clinic. He verbalized understanding of dc instructions.       Consults: Physical Medicine and Rehabilitation    Significant Diagnostic Studies: labs, 2D echo, MRI cspine, CT/CTA/CTP of head and neck, pcxr. Blood cultures, UA cx. Disposition: home    Discharge Medications:   Current Discharge Medication List      START taking these medications    Details   amLODIPine (NORVASC) 5 mg tablet Take 1 Tab by mouth daily. Qty: 90 Tab, Refills: 1    Associated Diagnoses: SAH (subarachnoid hemorrhage) (HCC)      butalbital-acetaminophen-caffeine (FIORICET, ESGIC) -40 mg per tablet Take 1 Tab by mouth every four (4) hours as needed for Headache. Qty: 30 Tab, Refills: 1    Associated Diagnoses: SAH (subarachnoid hemorrhage) ([address]         CONTINUE these medications which have NOT CHANGED    Details   telmisartan-hydroCHLOROthiazide (MICARDIS HCT) 40-12.5 mg per tablet Take 1 Tab by mouth daily. tamsulosin (FLOMAX) 0.4 mg capsule Take 0.4 mg by mouth daily. pravastatin (PRAVACHOL) 40 mg tablet Take 40 mg by mouth nightly. aspirin 81 mg chewable tablet Take 81 mg by mouth daily. latanoprost (XALATAN) 0.005 % ophthalmic solution Administer 1 Drop to both eyes nightly. budesonide-formoteroL (Symbicort) 160-4.5 mcg/actuation HFAA Take 2 Puffs by inhalation. Activity: No driving for 2 weeks, low impact walking for exercise. Activity as tolerated. Diet: Regular Diet  Wound Care: R groin: CDI on dc today, pulses are intact. Pt is to shower only next 7 days. No swimming or baths. Call us if any increased pain/bleeding or pain at site. DC time: 25 minutes for dc instructions, med recon and dc summary.        Follow-up Appointments   Procedures    FOLLOW UP VISIT Appointment in: One Month     Standing Status:   Standing     Number of Occurrences:   1     Order Specific Question:   Appointment in     Answer:   One Month       Signed By: Emery Cadena NP     January 13, 2021

## 2021-01-13 NOTE — DISCHARGE INSTRUCTIONS
1. Follow up with Dr. Marly Ortega in 4 weeks, Our office will call you to set up appointment date and time. Masoud Hayes will call you, if you have any questions prior to follow up call our office and ask for Judy Calix.)  2. Call your primary doctor and arrange for a follow up appointment asap, ensure they are aware of your recent admission for subarachnoid hemorrhage. 3. No baths/swimming next 7days. Shower only: this is due to puncture site at your right groin from procedure. Call us is any increased pain, drainage, numbness or weakness down your right leg. 4. No driving for atleast 2 weeks, then only if you are feeling better and can tolerate. Call our office if any issues. 5. You can do low impact exercise such as walking, but no running or heavy lifting over 5lbs. 6. Do no bend over or lift anything over 5lbs next 3 days due to right groin puncture site and closure. Call if any issues. 7. You may take over the counter Excedrine migraine for your headaches and/or motrin/alleve.

## 2021-01-13 NOTE — PROGRESS NOTES
Pt is discharging home in stable condition. Pt refused [facility] services. Nurse PHANI provided the pt with her business card with contact information on it in case he changes his mind. CM verified PCP and insurance. Pt reported that his son was transporting him home. No other d/c needs identified. Tx goals met. Care Management Interventions  PCP Verified by CM: Yes  Mode of Transport at Discharge:  Other (see comment)(Pts son)  Transition of Care Consult (CM Consult): Discharge Planning  Discharge Durable Medical Equipment: No  Physical Therapy Consult: Yes  Occupational Therapy Consult: Yes  Speech Therapy Consult: Yes  Current Support Network: Family Lives Nearby, Relative's Home(Pt lives with his daughter)  Confirm Follow Up Transport: Family  The Plan for Transition of Care is Related to the Following Treatment Goals : Return to his baseline  The Patient and/or Patient Representative was Provided with a Choice of Provider and Agrees with the Discharge Plan?: Yes  Name of the Patient Representative Who was Provided with a Choice of Provider and Agrees with the Discharge Plan: Patient  Freedom of Choice List was Provided with Basic Dialogue that Supports the Patient's Individualized Plan of Care/Goals, Treatment Preferences and Shares the Quality Data Associated with the Providers?: Yes   Resource Information Provided?: No  Discharge Location  Discharge Placement: Home with family assistance

## 2021-01-13 NOTE — PROGRESS NOTES
Pt removed from monitor while working with PT. Patient ambulatory of own power without apparent difficulty.

## 2021-01-13 NOTE — PROGRESS NOTES
ACUTE PHYSICAL THERAPY GOALS:  (Developed with and agreed upon by patient and/or caregiver. )  LTG:  (1.)Mr. Maria Luz Schwartz will move from supine to sit and sit to supine , scoot up and down and roll side to side in bed with INDEPENDENT within 7 treatment day(s). (2.)Mr. Maria Luz Scwhartz will transfer from bed to chair and chair to bed with MODIFIED INDEPENDENCE using the least restrictive device within 7 treatment day(s). (3.)Mr. Maria Luz Schwartz will ambulate with MODIFIED INDEPENDENCE for 500 feet with the least restrictive device within 7 treatment day(s). GOAL MET 2021    (4.)Mr. Maria Luz Schwartz will participate in therapeutic activity/exercises x 25 minutes for increased strength within 7 treatment days. (5.)Mr. Maria Luz Schwartz will ascend and descend 2 stairs using 0 hand rail(s) with SUPERVISION to improve functional mobility and safety within 7 day(s). PHYSICAL THERAPY: Daily Note and AM Treatment Day # 4    Koby Tellez is a 79 y.o. male   PRIMARY DIAGNOSIS: SAH (subarachnoid hemorrhage) ([facility] [address]  SAH (subarachnoid hemorrhage) ([facility] [address] [I60.9]         ASSESSMENT:     REHAB RECOMMENDATIONS: CURRENT LEVEL OF FUNCTION:  (Most Recently Demonstrated)   Recommendation to date pending progress:  [address] [place] [address]  Equipment:    None Bed Mobility:   Modified Independent  Sit to Stand:   Modified Independent  Transfers:   Supervision  Gait/Mobility:   Standby Assistance     ASSESSMENT:  Mr. Maria Luz Schwartz continues to make progress with therapy. Performs bed mobility with mod I, transfers with supervision. Ambulatory in room and hallway 500 ft then 250 ft more with SBA-supervision, no DME used. Mild trunk sway/path deviations however no loss of balance. Min verbal cues for gait safety, speed. Seated rest break between trials, SpO2 97% on RA. Performs standing functional activities and transfers after ambulation to address safety, independence, and activity tolerance. Good progress today.  Anticipate dc home, may benefit from [place] PT.      SUBJECTIVE:   Mr. Rosanne Andrea states, \"I guess I can try it\"    SOCIAL HISTORY/ LIVING ENVIRONMENT:   Home Environment: Private residence  # Steps to Enter: 2  One/Two Story Residence: Two story, live on 1st floor  Living Alone: Yes  Support Systems: Child(sonu)  OBJECTIVE:     PAIN: VITAL SIGNS: LINES/DRAINS:   Pre Treatment: Pain Screen  Pain Scale 1: Numeric (0 - 10)  Pain Intensity 1: 0  Post Treatment: none Vital Signs  O2 Sat (%): 97 %  O2 Device: Room air none  O2 Device: Room air     MOBILITY: I Mod I S SBA CGA Min Mod Max Total  NT x2 Comments:   Bed Mobility    Rolling [] [] [] [] [] [] [] [] [] [] []    Supine to Sit [] [x] [] [] [] [] [] [] [] [] []    Scooting [] [] [] [] [] [] [] [] [] [] []    Sit to Supine [] [] [] [] [] [] [] [] [] [] []    Transfers    Sit to Stand [] [] [x] [] [] [] [] [] [] [] []    Bed to Chair [] [] [x] [] [] [] [] [] [] [] []    Stand to Sit [] [] [x] [] [] [] [] [] [] [] []    I=Independent, Mod I=Modified Independent, S=Supervision, SBA=Standby Assistance, CGA=Contact Guard Assistance,   Min=Minimal Assistance, Mod=Moderate Assistance, Max=Maximal Assistance, Total=Total Assistance, NT=Not Tested    GAIT: I Mod I S SBA CGA Min Mod Max Total  NT x2 Comments:   Level of Assistance [] [] [x] [x] [] [] [] [] [] [] []    Distance 500 ft + 250 ft    DME None     Gait Quality Mild trunk sway, path deviations. No LOB. Weightbearing  Status N/A     I=Independent, Mod I=Modified Independent, S=Supervision, SBA=Standby Assistance, CGA=Contact Guard Assistance,   Min=Minimal Assistance, Mod=Moderate Assistance, Max=Maximal Assistance, Total=Total Assistance, NT=Not Tested    PLAN:   FREQUENCY/DURATION: PT Plan of Care: 3 times/week for duration of hospital stay or until stated goals are met, whichever comes first.  TREATMENT:     TREATMENT:   ($$ Therapeutic Activity: 23-37 mins    )  Therapeutic Activity (23 Minutes):  Therapeutic activity included Supine to Sit, Scooting, Transfer Training, Ambulation on level ground, Sitting balance , Standing balance and standing functional activities to improve functional Mobility, Strength, Activity tolerance and balance and to improve gait safety/independence.     AFTER TREATMENT POSITION/PRECAUTIONS:  Chair, Needs within reach and RN notified    INTERDISCIPLINARY COLLABORATION:  RN/PCT and PT/PTA    TOTAL TREATMENT DURATION:  PT Patient Time In/Time Out  Time In: 1124  Time Out: 20[facility]

## 2021-01-13 NOTE — PROGRESS NOTES
Discharge instructions and information reviewed with patient. A printed copy of all materials was given to patient. RN answered all of patient's questions; patient has no further questions at this time. AVS Signed electronically by Patient and this RN.

## 2021-01-13 NOTE — PROGRESS NOTES
Bedside, Verbal and Written shift change report given to Paul Sotelo RN (oncoming nurse) by Angelo Espinoza RN (offgoing nurse). Report included the following information SBAR, Kardex, ED Summary, OR Summary, Procedure Summary, Intake/Output, MAR, Med Rec Status, Cardiac Rhythm NSR, Alarm Parameters  and Dual Neuro Assessment. Dual NIH/Neuro performed at bedside.

## 2021-01-13 NOTE — PROGRESS NOTES
A follow up visit was made to the patient. Emotional support, spiritual presence and   prayer were provided for the patient. He was awake, alert, and oriented. He said that he was hoping to come home soon.       Remy Haynes, [facility], [facility]

## 2021-01-13 NOTE — PROGRESS NOTES
Bedside and Verbal shift change report given to Jose Awad RN (oncoming nurse) by Kylah Frank RN (offgoing nurse). Report included the following information Kardex, MAR, Recent Results, Cardiac Rhythm . and Dual Neuro Assessment.

## 2021-01-14 LAB
BACTERIA SPEC CULT: NORMAL
SERVICE CMNT-IMP: NORMAL

## 2021-01-16 LAB
BACTERIA SPEC CULT: NORMAL
BACTERIA SPEC CULT: NORMAL
SERVICE CMNT-IMP: NORMAL
SERVICE CMNT-IMP: NORMAL

## 2021-01-20 NOTE — PROGRESS NOTES
Physician Progress Note      PATIENT:               Colin Pearl  CSN #:                  [id number]  :                       1940  ADMIT DATE:       2021 11:26 AM  DISCH DATE:        2021 2:47 PM  RESPONDING  PROVIDER #:        Papo DAVALOS MD          QUERY TEXT:    Patient admitted with [address] and noted to have  BMI 40.08. Please clarify if patient has any of the following: The medical record reflects the following:  Risk Factors: BMI 40.08  Clinical Indicators: RD consult note documented BMI 40.1, obese class 3. IP rehab consult note has documented history of obesity. Treatment: RD consult. Regular diet. Thank You,  Annel Regan RN CDI  [phone]  Options provided:  -- Obesity  -- Morbid obesity  -- Overweight  -- BMI not clinically significant  -- Other - I will add my own diagnosis  -- Disagree - Not applicable / Not valid  -- Disagree - Clinically unable to determine / Unknown  -- Refer to Clinical Documentation Reviewer    PROVIDER RESPONSE TEXT:    This patient?s BMI is not clinically significant.     Query created by: Shantal Yeung on 2021 3:02 PM      Electronically signed by:  Paul Burns MD 2021 1:57 PM

## 2021-01-23 ENCOUNTER — HOSPITAL ENCOUNTER (INPATIENT)
Age: 81
LOS: 1 days | Discharge: HOME HEALTH | DRG: 064 | End: 2021-01-26
Attending: EMERGENCY MEDICINE | Admitting: HOSPITALIST
Payer: MEDICARE

## 2021-01-23 ENCOUNTER — APPOINTMENT (OUTPATIENT)
Dept: GENERAL RADIOLOGY | Age: 81
DRG: 064 | End: 2021-01-23
Attending: EMERGENCY MEDICINE
Payer: MEDICARE

## 2021-01-23 ENCOUNTER — APPOINTMENT (OUTPATIENT)
Dept: CT IMAGING | Age: 81
DRG: 064 | End: 2021-01-23
Attending: EMERGENCY MEDICINE
Payer: MEDICARE

## 2021-01-23 DIAGNOSIS — R40.4 TRANSIENT ALTERATION OF AWARENESS: Primary | ICD-10-CM

## 2021-01-23 LAB
ALBUMIN SERPL-MCNC: 3.7 G/DL (ref 3.2–4.6)
ALBUMIN/GLOB SERPL: 0.9 {RATIO} (ref 1.2–3.5)
ALP SERPL-CCNC: 83 U/L (ref 50–136)
ALT SERPL-CCNC: 24 U/L (ref 12–65)
ANION GAP SERPL CALC-SCNC: 5 MMOL/L (ref 7–16)
AST SERPL-CCNC: 13 U/L (ref 15–37)
BASOPHILS # BLD: 0.1 K/UL (ref 0–0.2)
BASOPHILS NFR BLD: 1 % (ref 0–2)
BILIRUB SERPL-MCNC: 0.4 MG/DL (ref 0.2–1.1)
BUN SERPL-MCNC: 19 MG/DL (ref 8–23)
CALCIUM SERPL-MCNC: 9 MG/DL (ref 8.3–10.4)
CHLORIDE SERPL-SCNC: 106 MMOL/L (ref 98–107)
CO2 SERPL-SCNC: 28 MMOL/L (ref 21–32)
CREAT SERPL-MCNC: 1.55 MG/DL (ref 0.8–1.5)
DIFFERENTIAL METHOD BLD: ABNORMAL
EOSINOPHIL # BLD: 0.4 K/UL (ref 0–0.8)
EOSINOPHIL NFR BLD: 6 % (ref 0.5–7.8)
ERYTHROCYTE [DISTWIDTH] IN BLOOD BY AUTOMATED COUNT: 12.5 % (ref 11.9–14.6)
GLOBULIN SER CALC-MCNC: 4.1 G/DL (ref 2.3–3.5)
GLUCOSE BLD STRIP.AUTO-MCNC: 114 MG/DL (ref 65–100)
GLUCOSE SERPL-MCNC: 101 MG/DL (ref 65–100)
HCT VFR BLD AUTO: 34.4 % (ref 41.1–50.3)
HGB BLD-MCNC: 11 G/DL (ref 13.6–17.2)
IMM GRANULOCYTES # BLD AUTO: 0 K/UL (ref 0–0.5)
IMM GRANULOCYTES NFR BLD AUTO: 0 % (ref 0–5)
INR BLD: 1.1 (ref 0.9–1.2)
LYMPHOCYTES # BLD: 2.7 K/UL (ref 0.5–4.6)
LYMPHOCYTES NFR BLD: 41 % (ref 13–44)
MCH RBC QN AUTO: 31.2 PG (ref 26.1–32.9)
MCHC RBC AUTO-ENTMCNC: 32 G/DL (ref 31.4–35)
MCV RBC AUTO: 97.5 FL (ref 79.6–97.8)
MONOCYTES # BLD: 0.7 K/UL (ref 0.1–1.3)
MONOCYTES NFR BLD: 10 % (ref 4–12)
NEUTS SEG # BLD: 2.8 K/UL (ref 1.7–8.2)
NEUTS SEG NFR BLD: 42 % (ref 43–78)
NRBC # BLD: 0 K/UL (ref 0–0.2)
PLATELET # BLD AUTO: 466 K/UL (ref 150–450)
PMV BLD AUTO: 9.4 FL (ref 9.4–12.3)
POTASSIUM SERPL-SCNC: 3.5 MMOL/L (ref 3.5–5.1)
PROCALCITONIN SERPL-MCNC: <0.05 NG/ML
PROT SERPL-MCNC: 7.8 G/DL (ref 6.3–8.2)
PT BLD: 12.8 SECS (ref 9.6–11.6)
RBC # BLD AUTO: 3.53 M/UL (ref 4.23–5.6)
SODIUM SERPL-SCNC: 139 MMOL/L (ref 136–145)
TROPONIN-HIGH SENSITIVITY: 6.4 PG/ML (ref 0–14)
WBC # BLD AUTO: 6.7 K/UL (ref 4.3–11.1)

## 2021-01-23 PROCEDURE — 83605 ASSAY OF LACTIC ACID: CPT

## 2021-01-23 PROCEDURE — 81003 URINALYSIS AUTO W/O SCOPE: CPT

## 2021-01-23 PROCEDURE — 71045 X-RAY EXAM CHEST 1 VIEW: CPT

## 2021-01-23 PROCEDURE — 87040 BLOOD CULTURE FOR BACTERIA: CPT

## 2021-01-23 PROCEDURE — 80053 COMPREHEN METABOLIC PANEL: CPT

## 2021-01-23 PROCEDURE — 82962 GLUCOSE BLOOD TEST: CPT

## 2021-01-23 PROCEDURE — 84484 ASSAY OF TROPONIN QUANT: CPT

## 2021-01-23 PROCEDURE — 85610 PROTHROMBIN TIME: CPT

## 2021-01-23 PROCEDURE — 74011250636 HC RX REV CODE- 250/636: Performed by: EMERGENCY MEDICINE

## 2021-01-23 PROCEDURE — 84145 PROCALCITONIN (PCT): CPT

## 2021-01-23 PROCEDURE — 83036 HEMOGLOBIN GLYCOSYLATED A1C: CPT

## 2021-01-23 PROCEDURE — 99285 EMERGENCY DEPT VISIT HI MDM: CPT

## 2021-01-23 PROCEDURE — 70450 CT HEAD/BRAIN W/O DYE: CPT

## 2021-01-23 PROCEDURE — 85025 COMPLETE CBC W/AUTO DIFF WBC: CPT

## 2021-01-23 RX ORDER — SODIUM CHLORIDE 9 MG/ML
100 INJECTION, SOLUTION INTRAVENOUS CONTINUOUS
Status: DISCONTINUED | OUTPATIENT
Start: 2021-01-23 | End: 2021-01-26 | Stop reason: HOSPADM

## 2021-01-23 RX ADMIN — SODIUM CHLORIDE 150 ML/HR: 900 INJECTION, SOLUTION INTRAVENOUS at 23:59

## 2021-01-24 ENCOUNTER — APPOINTMENT (OUTPATIENT)
Dept: CT IMAGING | Age: 81
DRG: 064 | End: 2021-01-24
Attending: HOSPITALIST
Payer: MEDICARE

## 2021-01-24 ENCOUNTER — APPOINTMENT (OUTPATIENT)
Dept: MRI IMAGING | Age: 81
DRG: 064 | End: 2021-01-24
Attending: FAMILY MEDICINE
Payer: MEDICARE

## 2021-01-24 PROBLEM — S06.5XAA SUBDURAL HEMATOMA: Status: ACTIVE | Noted: 2021-01-24

## 2021-01-24 PROBLEM — R41.0 TRANSIENT CONFUSION: Status: ACTIVE | Noted: 2021-01-24

## 2021-01-24 PROBLEM — I63.9 ACUTE ISCHEMIC STROKE (HCC): Status: ACTIVE | Noted: 2021-01-23

## 2021-01-24 LAB
EST. AVERAGE GLUCOSE BLD GHB EST-MCNC: 105 MG/DL
GLUCOSE BLD STRIP.AUTO-MCNC: 232 MG/DL (ref 65–100)
HBA1C MFR BLD: 5.3 % (ref 4.2–6.3)
LACTATE SERPL-SCNC: 1 MMOL/L (ref 0.4–2)

## 2021-01-24 PROCEDURE — 70551 MRI BRAIN STEM W/O DYE: CPT

## 2021-01-24 PROCEDURE — 99218 HC RM OBSERVATION: CPT

## 2021-01-24 PROCEDURE — 82962 GLUCOSE BLOOD TEST: CPT

## 2021-01-24 PROCEDURE — 74011250637 HC RX REV CODE- 250/637: Performed by: HOSPITALIST

## 2021-01-24 PROCEDURE — 94760 N-INVAS EAR/PLS OXIMETRY 1: CPT

## 2021-01-24 PROCEDURE — 74011250637 HC RX REV CODE- 250/637: Performed by: FAMILY MEDICINE

## 2021-01-24 PROCEDURE — 70450 CT HEAD/BRAIN W/O DYE: CPT

## 2021-01-24 PROCEDURE — 94640 AIRWAY INHALATION TREATMENT: CPT

## 2021-01-24 PROCEDURE — 74011000250 HC RX REV CODE- 250: Performed by: FAMILY MEDICINE

## 2021-01-24 RX ORDER — BUDESONIDE AND FORMOTEROL FUMARATE DIHYDRATE 160; 4.5 UG/1; UG/1
2 AEROSOL RESPIRATORY (INHALATION)
Status: DISCONTINUED | OUTPATIENT
Start: 2021-01-24 | End: 2021-01-26 | Stop reason: HOSPADM

## 2021-01-24 RX ORDER — ASPIRIN 325 MG
325 TABLET ORAL DAILY
Status: DISCONTINUED | OUTPATIENT
Start: 2021-01-24 | End: 2021-01-25

## 2021-01-24 RX ORDER — METOPROLOL TARTRATE 5 MG/5ML
2.5 INJECTION INTRAVENOUS
Status: DISCONTINUED | OUTPATIENT
Start: 2021-01-24 | End: 2021-01-26 | Stop reason: HOSPADM

## 2021-01-24 RX ORDER — AMLODIPINE BESYLATE 5 MG/1
5 TABLET ORAL DAILY
Status: DISCONTINUED | OUTPATIENT
Start: 2021-01-24 | End: 2021-01-26 | Stop reason: HOSPADM

## 2021-01-24 RX ORDER — BISACODYL 5 MG
5 TABLET, DELAYED RELEASE (ENTERIC COATED) ORAL DAILY PRN
Status: DISCONTINUED | OUTPATIENT
Start: 2021-01-24 | End: 2021-01-26 | Stop reason: HOSPADM

## 2021-01-24 RX ORDER — GUAIFENESIN 100 MG/5ML
81 LIQUID (ML) ORAL DAILY
Status: DISCONTINUED | OUTPATIENT
Start: 2021-01-24 | End: 2021-01-24 | Stop reason: SDUPTHER

## 2021-01-24 RX ORDER — SODIUM CHLORIDE 0.9 % (FLUSH) 0.9 %
5-40 SYRINGE (ML) INJECTION EVERY 8 HOURS
Status: DISCONTINUED | OUTPATIENT
Start: 2021-01-24 | End: 2021-01-26 | Stop reason: HOSPADM

## 2021-01-24 RX ORDER — LATANOPROST 50 UG/ML
1 SOLUTION/ DROPS OPHTHALMIC
Status: DISCONTINUED | OUTPATIENT
Start: 2021-01-24 | End: 2021-01-26 | Stop reason: HOSPADM

## 2021-01-24 RX ORDER — ACETAMINOPHEN 325 MG/1
650 TABLET ORAL
Status: DISCONTINUED | OUTPATIENT
Start: 2021-01-24 | End: 2021-01-26 | Stop reason: HOSPADM

## 2021-01-24 RX ORDER — PRAVASTATIN SODIUM 20 MG/1
40 TABLET ORAL
Status: DISCONTINUED | OUTPATIENT
Start: 2021-01-24 | End: 2021-01-26 | Stop reason: HOSPADM

## 2021-01-24 RX ORDER — HYDRALAZINE HYDROCHLORIDE 25 MG/1
25 TABLET, FILM COATED ORAL 3 TIMES DAILY
Status: DISCONTINUED | OUTPATIENT
Start: 2021-01-24 | End: 2021-01-26 | Stop reason: HOSPADM

## 2021-01-24 RX ORDER — ONDANSETRON 2 MG/ML
4 INJECTION INTRAMUSCULAR; INTRAVENOUS
Status: DISCONTINUED | OUTPATIENT
Start: 2021-01-24 | End: 2021-01-26 | Stop reason: HOSPADM

## 2021-01-24 RX ORDER — TAMSULOSIN HYDROCHLORIDE 0.4 MG/1
0.4 CAPSULE ORAL DAILY
Status: DISCONTINUED | OUTPATIENT
Start: 2021-01-24 | End: 2021-01-26 | Stop reason: HOSPADM

## 2021-01-24 RX ORDER — SODIUM CHLORIDE 0.9 % (FLUSH) 0.9 %
5-40 SYRINGE (ML) INJECTION AS NEEDED
Status: DISCONTINUED | OUTPATIENT
Start: 2021-01-24 | End: 2021-01-26 | Stop reason: HOSPADM

## 2021-01-24 RX ORDER — ENOXAPARIN SODIUM 100 MG/ML
30 INJECTION SUBCUTANEOUS EVERY 24 HOURS
Status: CANCELLED | OUTPATIENT
Start: 2021-01-24

## 2021-01-24 RX ORDER — FAMOTIDINE 20 MG/1
20 TABLET, FILM COATED ORAL EVERY 24 HOURS
Status: DISCONTINUED | OUTPATIENT
Start: 2021-01-24 | End: 2021-01-26 | Stop reason: HOSPADM

## 2021-01-24 RX ORDER — BUTALBITAL, ACETAMINOPHEN AND CAFFEINE 50; 325; 40 MG/1; MG/1; MG/1
1 TABLET ORAL
Status: DISCONTINUED | OUTPATIENT
Start: 2021-01-24 | End: 2021-01-26 | Stop reason: HOSPADM

## 2021-01-24 RX ADMIN — LATANOPROST 1 DROP: 50 SOLUTION OPHTHALMIC at 21:26

## 2021-01-24 RX ADMIN — AMLODIPINE BESYLATE 5 MG: 5 TABLET ORAL at 12:49

## 2021-01-24 RX ADMIN — ASPIRIN 325 MG ORAL TABLET 325 MG: 325 PILL ORAL at 16:52

## 2021-01-24 RX ADMIN — Medication 10 ML: at 12:17

## 2021-01-24 RX ADMIN — Medication 10 ML: at 21:24

## 2021-01-24 RX ADMIN — TAMSULOSIN HYDROCHLORIDE 0.4 MG: 0.4 CAPSULE ORAL at 13:22

## 2021-01-24 RX ADMIN — FAMOTIDINE 20 MG: 20 TABLET, FILM COATED ORAL at 12:22

## 2021-01-24 RX ADMIN — PRAVASTATIN SODIUM 40 MG: 20 TABLET ORAL at 21:24

## 2021-01-24 RX ADMIN — VALSARTAN: 80 TABLET ORAL at 12:52

## 2021-01-24 RX ADMIN — BUDESONIDE AND FORMOTEROL FUMARATE DIHYDRATE 2 PUFF: 160; 4.5 AEROSOL RESPIRATORY (INHALATION) at 20:53

## 2021-01-24 NOTE — PROGRESS NOTES
STAND screening completed with no issues. 01/24/21 0906   Dysphagia Screening   Vocal Quality/Secretions 0   History of Dysphagia 0   O2 Saturation 0   Alertness 0   Pre-Swallow Assessment Score 0   Purees 0   Water by Cup 0   Water by Straw 0     Assisting Primary RN Felicitas Odonnell.

## 2021-01-24 NOTE — PROGRESS NOTES
CM met with patient to complete initial assessment. Patient alert and oriented, found standing up in room. Patient states that he lives in a two story home with approximately 13 steps inside and 2 ANIKET. Patient lives alone. Patient is independent with ADL's, still drives. Demographics, insurance and PCP confirmed. DME's include a RW and cane. CM will continue to follow patient's chart to assist with d/c planning and supportive care needs.      Care Management Interventions  PCP Verified by CM: Yes(Dr. Noel Mclaughlin)  Transition of Care Consult (CM Consult): Discharge Planning  Current Support Network: Lives Alone  Discharge Location  Discharge Placement: Unable to determine at this time

## 2021-01-24 NOTE — ED NOTES
TRANSFER - OUT REPORT:    Verbal report given to RN on Rico Bernard  being transferred to 2 nd floor for routine progression of care       Report consisted of patients Situation, Background, Assessment and   Recommendations(SBAR). Information from the following report(s) SBAR, ED Summary and MAR was reviewed with the receiving nurse. Lines:   PICC Triple Lumen 27/93/44 Right;Basilic (Active)       Peripheral IV 01/23/21 Left Antecubital (Active)       Peripheral IV 01/23/21 Right Antecubital (Active)        Opportunity for questions and clarification was provided.       Patient transported with:   Prescription Eyewear

## 2021-01-24 NOTE — ED TRIAGE NOTES
EMS: Pt arriving from home via [address]. Pt had sudden onset of AMS following getting up from a nap this afternoon. Pt daughter called EMS when the pt didn't understand why she was in his home (daughter lives with pt). LKW 11am 1/23. Pt was admitted recently for 'blood on the brain' and underwent a full stroke workup and they were unable to find the source of the bleeding. 150/88, Tachy at 120, Afebrile, , 98.7 oral. 18gLAC. Lopeno negative. Pt presents alert and oriented to triage. Pt know month but not year. Pt sts he knows his daughter called EMS \"because of my brain\". NIH 0 during triage.      Dr Elizabeth Manzanares present during triage

## 2021-01-24 NOTE — ED PROVIDER NOTES
Patient arrived to the emerge from by EMS from home with a history of confusion. Patient has a history of recent subarachnoid hemorrhage with no etiology identified. Reportedly per EMS the daughter stated he woke from sleep and did not either recognize her or understand why she was in the house when she actually lives with him. It is unclear how long this confusion lasted. Upon arrival to the emergency department the patient is ANO x2 with slight confusion to date. NIH is 0. The history is provided by the patient and medical records. Altered mental status   This is a new problem. The current episode started 1 to 2 hours ago. Associated symptoms include confusion. Pertinent negatives include no somnolence, no seizures, no unresponsiveness, no weakness, no agitation, no delusions, no hallucinations, no self-injury, no violence, no tingling and no numbness. Mental status baseline is normal.  Risk factors: Recent subarachnoid hemorrhage. His past medical history is significant for CVA and hypertension. His past medical history does not include diabetes, seizures, liver disease, TIA, COPD, depression, dementia, psychotropic medication treatment, head trauma or heart disease. No past medical history on file. No past surgical history on file. No family history on file.     Social History     Socioeconomic History    Marital status:      Spouse name: Not on file    Number of children: Not on file    Years of education: Not on file    Highest education level: Not on file   Occupational History    Not on file   Social Needs    Financial resource strain: Not on file    Food insecurity     Worry: Not on file     Inability: Not on file    Transportation needs     Medical: Not on file     Non-medical: Not on file   Tobacco Use    Smoking status: Not on file   Substance and Sexual Activity    Alcohol use: Not on file    Drug use: Not on file    Sexual activity: Not on file   Lifestyle    Physical activity     Days per week: Not on file     Minutes per session: Not on file    Stress: Not on file   Relationships    Social connections     Talks on phone: Not on file     Gets together: Not on file     Attends Gnosticism service: Not on file     Active member of club or organization: Not on file     Attends meetings of clubs or organizations: Not on file     Relationship status: Not on file    Intimate partner violence     Fear of current or ex partner: Not on file     Emotionally abused: Not on file     Physically abused: Not on file     Forced sexual activity: Not on file   Other Topics Concern    Not on file   Social History Narrative    Not on file         ALLERGIES: Patient has no known allergies. Review of Systems   Constitutional: Negative for chills and fever. Neurological: Negative for tingling, seizures, weakness and numbness. Psychiatric/Behavioral: Positive for confusion. Negative for agitation, hallucinations and self-injury. All other systems reviewed and are negative. Vitals:    01/23/21 2146 01/23/21 2159   BP: (!) 147/83 (!) 147/79   Pulse: (!) 113 (!) 112   Resp: 16 15   Temp: 98.1 °F (36.7 °C)    SpO2: 96% 94%   Weight: 117.9 kg (260 lb)    Height: 5' 7\" (1.702 m)             Physical Exam  Vitals signs and nursing note reviewed. Constitutional:       General: He is not in acute distress. Appearance: Normal appearance. He is not ill-appearing, toxic-appearing or diaphoretic. HENT:      Head: Normocephalic and atraumatic. Mouth/Throat:      Mouth: Mucous membranes are moist.      Pharynx: No oropharyngeal exudate or posterior oropharyngeal erythema. Eyes:      Extraocular Movements: Extraocular movements intact. Conjunctiva/sclera: Conjunctivae normal.      Pupils: Pupils are equal, round, and reactive to light. Neck:      Musculoskeletal: Normal range of motion and neck supple. Cardiovascular:      Rate and Rhythm: Regular rhythm.  Tachycardia present. Pulses: Normal pulses. Heart sounds: Normal heart sounds. Pulmonary:      Effort: Pulmonary effort is normal.      Breath sounds: Normal breath sounds. Abdominal:      General: Bowel sounds are normal. There is no distension. Palpations: Abdomen is soft. Tenderness: There is no abdominal tenderness. There is no guarding or rebound. Musculoskeletal: Normal range of motion. Skin:     General: Skin is warm and dry. Capillary Refill: Capillary refill takes less than 2 seconds. Neurological:      General: No focal deficit present. Mental Status: He is alert. Mental status is at baseline. Cranial Nerves: No cranial nerve deficit. Sensory: No sensory deficit. Motor: No weakness. Coordination: Coordination normal.      Gait: Gait normal.      Deep Tendon Reflexes: Reflexes normal.      Comments: Disoriented to date. Psychiatric:         Mood and Affect: Mood normal.         Behavior: Behavior normal.         Thought Content:  Thought content normal.          MDM  Number of Diagnoses or Management Options     Amount and/or Complexity of Data Reviewed  Clinical lab tests: ordered and reviewed  Tests in the radiology section of CPT®: ordered and reviewed  Review and summarize past medical records: yes  Independent visualization of images, tracings, or specimens: yes    Risk of Complications, Morbidity, and/or Mortality  Presenting problems: high  Diagnostic procedures: moderate  Management options: moderate    Patient Progress  Patient progress: stable         Procedures

## 2021-01-24 NOTE — PROGRESS NOTES
Report received from off going nurse, Julio Cardona RN. Patient in bed resting quietly, alert to name and place with some confusion as to the day of the week. Asked several times and was told it was Sunday. Patient still dressed in clothing from home. Patient given clothing bag and a patient gown and assisted with removing shirt to prevent accidental dislocation of IV's in place. Patient was able to remove pants without assistance. Patient still attached to dial flow from ER. Patients fluids placed on IV pump at ordered rate. No distress noted.

## 2021-01-24 NOTE — PROGRESS NOTES
MRI called for primary RN. Unable to have pt to sign for MRI safety due to lack of signature pad; also unable to print selected cells of MRI screening questions.   Per MRI ok to send patient and MRI is to have pt sign forms in their department before test.

## 2021-01-24 NOTE — H&P
HOSPITALIST H&P  NAME:  Wilbur Bedoya   Age:  79 y.o.  :   1940   MRN:   [id number]  PCP: Unknown, Provider  Treatment Team: Attending Provider: Maty Abdi MD; Primary Nurse: Chacha Person RN    Prior     CC: Reason for admission is: Transient alteration of awareness; possible TIA    HPI:   Patient history was obtained from the ER provider prior to seeing the patient. Patient is a 79 y.o. male who presents to the ER due to an episode of confusion earlier today. Per daughter, patient awoke from a nap and did not recognize her or understand why she was in his house. She actually lives with him. It is unknown how long this episode lasted. Once he arrived to the ER he was mostly alert and oriented. Patient is able to answer questions for me, except for that period of time. He states he feels fine now and would really like to go home. He was sent to the ER primarily because he has a recent history of subarachnoid hemorrhage. ER did do a head CT and there is no acute findings at this time. ER discussed case with neurology, who recommended an MRI in the morning. We are asked to admit for continued evaluation and observation. Patient currently denies any symptoms or any recent symptoms such as fever/chills, NVD, chest pain, shortness of breath, abdominal pain, headaches, vision changes, numbness or tingling, or weakness. ROS:  All systems have been reviewed and are negative except as stated in HPI or elsewhere. No past medical history on file. HTN; obesity; h/o SAH  No past surgical history on file. Social History     Tobacco Use    Smoking status: Not on file   Substance Use Topics    Alcohol use: Not on file      No family history on file. FH Reviewed and non-contributory to admitting diagnosis    No Known Allergies   Prior to Admission Medications   Prescriptions Last Dose Informant Patient Reported? Taking?    amLODIPine (NORVASC) 5 mg tablet No No   Sig: Take 1 Tab by mouth daily. aspirin 81 mg chewable tablet   Yes No   Sig: Take 81 mg by mouth daily. budesonide-formoteroL (Symbicort) 160-4.5 mcg/actuation HFAA   Yes No   Sig: Take 2 Puffs by inhalation. butalbital-acetaminophen-caffeine (FIORICET, ESGIC) -40 mg per tablet   No No   Sig: Take 1 Tab by mouth every four (4) hours as needed for Headache.   latanoprost (XALATAN) 0.005 % ophthalmic solution   Yes No   Sig: Administer 1 Drop to both eyes nightly. pravastatin (PRAVACHOL) 40 mg tablet   Yes No   Sig: Take 40 mg by mouth nightly. tamsulosin (FLOMAX) 0.4 mg capsule   Yes No   Sig: Take 0.4 mg by mouth daily. telmisartan-hydroCHLOROthiazide (MICARDIS HCT) 40-12.5 mg per tablet   Yes No   Sig: Take 1 Tab by mouth daily. Facility-Administered Medications: None         Objective:     No intake or output data in the 24 hours ending 21 0039   Temp (24hrs), Av.1 °F (36.7 °C), Min:98.1 °F (36.7 °C), Max:98.1 °F (36.7 °C)    Oxygen Therapy  O2 Sat (%): 99 % (21)  Pulse via Oximetry: 100 beats per minute (21)  O2 Device: Room air (21)   Body mass index is 40.72 kg/m². Patient Vitals for the past 24 hrs:   Temp Pulse Resp BP SpO2   21  100  (!) 164/107    21  99   99 %   21 2330  (!) 102  (!) 164/107 97 %   21 2159  (!) 112 15 (!) 147/79 94 %   21 98.1 °F (36.7 °C) (!) 113 16 (!) 147/83 96 %     Physical Exam:    General:    WD and WN, No apparent distress. Head:   Normocephalic, without obvious abnormality, atraumatic. Eyes:  PERRL; EOMI; sclera normal/non-icteric  ENT:  Hearing is normal.  Oropharynx is clear with tacky mucous membranes   Resp:    Clear to auscultation bilaterally. No Wheezing or Rhonchi. Resp are even and unlabored  Heart[de-identified]  Regular rate and rhythm,  no murmur,   No LE edema  Abdomen:   Soft, non-tender. Not distended.   Bowel sounds normal.  hepato-splenomegaly cannot be assessed due to obesity     Musc/SK: Muscle strength is good and tone normal; No cyanosis. No clubbing  Skin:     Texture, turgor normal. No significant rashes or lesions. Capillary refill < 2 sec  Neurologic: CN II - XII are grossly intact - Eye exam as noted above  Psych: Alert and oriented x 4;  Judgement and insight are normal     Data Review:   Recent Results (from the past 24 hour(s))   GLUCOSE, POC    Collection Time: 01/23/21  9:42 PM   Result Value Ref Range    Glucose (POC) 114 (H) 65 - 100 mg/dL   POC PT/INR    Collection Time: 01/23/21  9:45 PM   Result Value Ref Range    Prothrombin time (POC) 12.8 (H) 9.6 - 11.6 SECS    INR (POC) 1.1 0.9 - 1.2     CBC WITH AUTOMATED DIFF    Collection Time: 01/23/21  9:52 PM   Result Value Ref Range    WBC 6.7 4.3 - 11.1 K/uL    RBC 3.53 (L) 4.23 - 5.6 M/uL    HGB 11.0 (L) 13.6 - 17.2 g/dL    HCT 34.4 (L) 41.1 - 50.3 %    MCV 97.5 79.6 - 97.8 FL    MCH 31.2 26.1 - 32.9 PG    MCHC 32.0 31.4 - 35.0 g/dL    RDW 12.5 11.9 - 14.6 %    PLATELET 742 (H) 981 - 450 K/uL    MPV 9.4 9.4 - 12.3 FL    ABSOLUTE NRBC 0.00 0.0 - 0.2 K/uL    DF AUTOMATED      NEUTROPHILS 42 (L) 43 - 78 %    LYMPHOCYTES 41 13 - 44 %    MONOCYTES 10 4.0 - 12.0 %    EOSINOPHILS 6 0.5 - 7.8 %    BASOPHILS 1 0.0 - 2.0 %    IMMATURE GRANULOCYTES 0 0.0 - 5.0 %    ABS. NEUTROPHILS 2.8 1.7 - 8.2 K/UL    ABS. LYMPHOCYTES 2.7 0.5 - 4.6 K/UL    ABS. MONOCYTES 0.7 0.1 - 1.3 K/UL    ABS. EOSINOPHILS 0.4 0.0 - 0.8 K/UL    ABS. BASOPHILS 0.1 0.0 - 0.2 K/UL    ABS. IMM.  GRANS. 0.0 0.0 - 0.5 K/UL   METABOLIC PANEL, COMPREHENSIVE    Collection Time: 01/23/21  9:52 PM   Result Value Ref Range    Sodium 139 136 - 145 mmol/L    Potassium 3.5 3.5 - 5.1 mmol/L    Chloride 106 98 - 107 mmol/L    CO2 28 21 - 32 mmol/L    Anion gap 5 (L) 7 - 16 mmol/L    Glucose 101 (H) 65 - 100 mg/dL    BUN 19 8 - 23 MG/DL    Creatinine 1.55 (H) 0.8 - 1.5 MG/DL    GFR est AA 56 (L) >60 ml/min/1.73m2    GFR est non-AA 46 (L) >60 ml/min/1.73m2    Calcium 9.0 8.3 - 10.4 MG/DL    Bilirubin, total 0.4 0.2 - 1.1 MG/DL    ALT (SGPT) 24 12 - 65 U/L    AST (SGOT) 13 (L) 15 - 37 U/L    Alk. phosphatase 83 50 - 136 U/L    Protein, total 7.8 6.3 - 8.2 g/dL    Albumin 3.7 3.2 - 4.6 g/dL    Globulin 4.1 (H) 2.3 - 3.5 g/dL    A-G Ratio 0.9 (L) 1.2 - 3.5     PROCALCITONIN    Collection Time: 01/23/21  9:52 PM   Result Value Ref Range    Procalcitonin <0.05 ng/mL   TROPONIN-HIGH SENSITIVITY    Collection Time: 01/23/21  9:52 PM   Result Value Ref Range    Troponin-High Sensitivity 6.4 0 - 14 pg/mL   LACTIC ACID    Collection Time: 01/23/21 11:57 PM   Result Value Ref Range    Lactic acid 1.0 0.4 - 2.0 MMOL/L     CXR Results  (Last 48 hours)               01/23/21 2238  XR CHEST PORT Final result    Impression:      1. No pulmonary consolidation or pulmonary edema. Narrative:  Portable chest xray         COMPARISON: January 21, 2021       INDICATION: Tachycardia, altered mental status       FINDINGS:        Heart is mildly enlarged. Mediastinal contour is within normal limits. There is   no focal pulmonary consolidation, pneumothorax or pulmonary edema. No large   pleural effusion. There is emphysema. Surrounding bones are unremarkable. CT Results  (Last 48 hours)               01/23/21 2250  CT HEAD WO CONT Final result    Impression:      1. No CT evidence of acute intracranial process. Previously noted intracranial   hemorrhage is no longer present. Narrative:  Noncontrast CT of the brain. COMPARISON: January 5, 2021       INDICATION: Altered mental status       TECHNIQUE: Contiguous axial images were obtained from the skull base through the   vertex without IV contrast. Radiation dose reduction techniques were used for   this study:  Our CT scanners use one or all of the following: Automated exposure   control, adjustment of the mA and/or kVp according to patient's size, iterative   reconstruction. FINDINGS:       There is no acute intracranial hemorrhage or evidence for acute territorial   infarction. There is no mass effect, midline shift or hydrocephalus. There is no   extra-axial fluid collection. The cerebellum and brainstem are grossly   unremarkable. Periventricular diffuse hypodensities are nonspecific and likely secondary to   chronic small vessel changes. There is old infarct in the left basal ganglia. Included globes appear intact. The visualized paranasal sinuses and the mastoid   air cells are aerated. There is no skull fracture. Assessment and Plan: Active Hospital Problems    Diagnosis Date Noted    Transient alteration of awareness 01/23/2021    Morbid obesity due to excess calories ([facility] [address] 10/04/2016    Essential hypertension 09/09/2015     Principal Problem:    Transient alteration of awareness (1/23/2021)  We will continue with CVA work-up including MRI, and labs in the morning. He has recently had an echo, and neurology did not think he needed a CTA of the head neck at this time. Active Problems:    Essential hypertension (9/9/2015)    Continue home meds and add prn hydralazine, if needed. · PLAN General  · DVT prophylaxis: SCDs  · Code status: Full;  HCPOA:   · Risk: high  · Anticipated DC needs:  · Estimated LOS: Less than 2 midnights  · Plans discussed with patient and/or caregiver; questions answered. Parts of this document were created using dragon voice recognition software.  The chart has been reviewed but errors may still be present    Med records reviewed if applicable; findings:     Critical care time if applicable:      Signed By: Anni Su MD     January 24, 2021

## 2021-01-24 NOTE — CONSULTS
CONSULT    Patient: Fabricio Castellano MRN: [id number]  SSN: [id number]    YOB: 1940  Age: 79 y.o. Sex: male      Subjective:      Fabricio Castellano is a 79 y.o. male who is well known to our service with prior SAH. Mr. Nolan Oscar was admitted on 1-5-21 with [facility], he had two negative cerebral angiograms during that admit as well as neg MRI Cspine for any AV fistula as source. Mr. Nolan Oscar was dc'd home on 1-13-21 and did not require any inpt or outpt rehab. He has scheduled follow up with Dr. Andrew Montalvo. Mr. Nolan Oscar was seen in our ED last pm for altered mental status. I did speak with daughter during the afternoon yesterday, she called the answering service concerned that her father was having some memory issues, both short and long term. We discussed pt and other than memory, he was neuro intact, but at some point there was concern and she brought him into the ED to be evaluated. Mr. Nolan Oscar had a CT head last pm that was negative, His NIHSS was 0. A MRI brain was obtained today and there was concern for R thalmic infarct and subdural hematoma  Per Radiology read and pt was taken back for repeat HCT today and it was negative for any acute ICH/SDH or any shift. I saw Mr. Nolan Oscar in [place]. He is aox3, maew and in nad. NIHSS of 0. MED HX:   SAH - neg angio: 1-5-21  HTN, Hyperlipidemia, BPH, asthma, anemia, Syncope  SURGICAL HX: none  SOCIAL HX: Lives with daughter  TOBACCO: NEVER  ETOH: NO       Prior to Admission medications    Medication Sig Start Date End Date Taking? Authorizing Provider   amLODIPine (NORVASC) 5 mg tablet Take 1 Tab by mouth daily. 1/14/21   Tito Kraft NP   butalbital-acetaminophen-caffeine (FIORICET, ESGIC) -40 mg per tablet Take 1 Tab by mouth every four (4) hours as needed for Headache. 1/13/21   Tito Kraft NP   telmisartan-hydroCHLOROthiazide (MICARDIS HCT) 40-12.5 mg per tablet Take 1 Tab by mouth daily.     Provider, Historical tamsulosin (FLOMAX) 0.4 mg capsule Take 0.4 mg by mouth daily. Provider, Historical   pravastatin (PRAVACHOL) 40 mg tablet Take 40 mg by mouth nightly. Provider, Historical   aspirin 81 mg chewable tablet Take 81 mg by mouth daily. Provider, Historical   latanoprost (XALATAN) 0.005 % ophthalmic solution Administer 1 Drop to both eyes nightly. Provider, Historical   budesonide-formoteroL (Symbicort) 160-4.5 mcg/actuation HFAA Take 2 Puffs by inhalation. Provider, Historical        No Known Allergies    Review of Systems:  Constitutional: well nourished male in NAD  Eyes:  no change in visual acuity, no photophobia  Ears, nose, mouth, throat, and face: no  Odynphagia, dysphagia, no thrush or exudate, negative for chronic sinus congestion, recurrent headaches  Respiratory: negative for SOB, hemoptysis or cough  Cardiovascular: negative for CP, palpitations, or PND  Gastrointestinal: negative for abdominal pain, no hematemesis, hematochezia or BRBPR  Genitourinary: no urgency, frequency, or dysuria, no nocturia  Integument/breast: negative for skin rash or skin lesions  Hematologic/lymphatic: negative for known bleeding disorder  Musculoskeletal:negative for joint pain or joint tenderness  Neurological: negative for lightheadedness, syncope or presyncopal events, no seizure or CVA history  + for memory changes    Objective:     Vitals:    01/24/21 0230 01/24/21 0715 01/24/21 1249 01/24/21 1500   BP: (!) 130/59 125/87 130/66 111/62   Pulse: (!) 110 98 68 88   Resp:  14  15   Temp: 98.2 °F (36.8 °C) 98.3 °F (36.8 °C)  98.5 °F (36.9 °C)   SpO2: 99% 98%  95%   Weight:       Height:            Physical Exam:  General:  Alert, cooperative, no distress, appears stated age. HEENT: Supple, symmetrical, trachea midline, no adenopathy, thyroid: no enlargment/tenderness/nodules, no carotid bruit and no JVD. Lungs:   Clear to auscultation bilaterally.    Heart:  Regular rate and rhythm, S1, S2 normal, no murmur, click, rub or gallop. Abdomen:   Soft, non-tender. Bowel sounds normal. No masses,  No organomegaly. Extremities: Extremities normal, atraumatic, no cyanosis or edema. Pulses: 2+ and symmetric all extremities. Skin: Skin color, texture, turgor normal. No rashes or lesions   Neurologic: CNII-XII intact. Normal strength, sensation and reflexes throughout. Assessment:     Hospital Problems  Date Reviewed: 1/5/2021          Codes Class Noted POA    [facility] ICD-10-CM: X36.4Z4V  ICD-9-CM: 432.1  1/24/2021 Unknown        Transient confusion ICD-10-CM: R41.0  ICD-9-CM: 298.9  1/24/2021 Unknown        * (Principal) Acute ischemic stroke ([facility] ICD-10-CM: I63.9  ICD-9-CM: 434.91  1/23/2021 Yes        Morbid obesity due to excess calories ([facility]) (Chronic) ICD-10-CM: E66.01  ICD-9-CM: 278.01  10/4/2016 Yes        Essential hypertension (Chronic) ICD-10-CM: I10  ICD-9-CM: 401.9  9/9/2015 Yes              Plan:     CT head and MRI brain have been reviewed and discussed with DR. Shivani Kelley. 1. No acute findings on CT head:   2. MRI brain shows small R Thalmic infarct: left frontal convexity SDH is old and not acute. No mass effect. Not the source of any memory loss for Mr. Nancy Mcdaniel. [facility] with [facility] and changes in memory are common. Dr. Shivani Kelley and I have discussed this with pt and daughter. 3. Pt can have his ASA daily per Dr. Shivani Kelley. No need to hold. 4. Dr. Shivani Kelley will see Mr. Nancy Mcdaniel back in clinic in next two weeks.    5. If any questions or concerns call us asap: [phone]    Signed By: Aminata Romo NP     January 24, 2021

## 2021-01-24 NOTE — DISCHARGE INSTRUCTIONS

## 2021-01-24 NOTE — PROGRESS NOTES
Hospitalist Progress Note    2021  Admit Date: 2021  9:38 PM   NAME: Jhonatan Rizo   :  1940   MRN:  [id number]   Attending: Froilan Bronson MD  PCP:  Unknown, Provider    SUBJECTIVE:     Jhonatan Rizo is a 24-year-old -American male with history of hypertension, recent subarachnoid and subdural hemorrhage, obesity admitted on  in view of transient acute encephalopathy that started yesterday afternoon. Patient was admitted for TIA work-up. CT head without contrast done on admission was negative for any acute intracranial process and no intracranial hemorrhage was appreciated. MRI done this morning  revealed right anteromedial thalamic infarction and focal infarcts in the right posterior paramedian parietal lobe and right occipital lobe with interval development of left cerebral convexity subdural hematoma measuring 8 mm in thickness with minimal mass-effect upon left cerebral hemisphere. : Patient seen at bedside, comfortably sitting up in bed. Still with intermittent confusion, difficulty in recalling year. Denies having any visual field defects, headache, nausea vomiting, numbness tingling or weakness in extremities. No chest pain, nausea vomiting or diarrhea. Review of Systems negative with exception of pertinent positives noted above      PHYSICAL EXAM       Visit Vitals  /87 (BP 1 Location: Left arm)   Pulse 98   Temp 98.3 °F (36.8 °C)   Resp 14   Ht 5' 7\" (1.702 m)   Wt 117.9 kg (260 lb)   SpO2 98%   BMI 40.72 kg/m²      Temp (24hrs), Av.2 °F (36.8 °C), Min:98.1 °F (36.7 °C), Max:98.3 °F (36.8 °C)    Oxygen Therapy  O2 Sat (%): 98 % (21 0715)  Pulse via Oximetry: 100 beats per minute (21 2351)  O2 Device: Room air (21 2146)  No intake or output data in the 24 hours ending 21 0912       General: NAD, alert awake, on room air  Head:  Atraumatic Normocephalic. Eyes:  PERRLA, EOMI, Anicteric.   ENT:  No discharges/lesions. Lungs:  CTA Bilaterally. CVS:  Regular rate and rhythm,  No murmur, rub, or gallop, No JVD, No lower   extremity edema. Abdomen: Soft, Non distended, Non tender, Positive bowel sounds. MSK:  No deformities, lesions, Spontaneously moves extremities. Neurologic:  GCS 15, no motor or sensory deficits, cranial nerves II through grossly intact, cerebellar functions intact  Psychiatry:      AO x2 mood and affect flat  Skin:   No rash/lesions. Good skin turgor  Heme/Lymph/Immune:  No petechiae, ecchymoses, overt signs of bleeding or    lymphadenopathy noted. Recent Results (from the past 24 hour(s))   GLUCOSE, POC    Collection Time: 01/23/21  9:42 PM   Result Value Ref Range    Glucose (POC) 114 (H) 65 - 100 mg/dL   POC PT/INR    Collection Time: 01/23/21  9:45 PM   Result Value Ref Range    Prothrombin time (POC) 12.8 (H) 9.6 - 11.6 SECS    INR (POC) 1.1 0.9 - 1.2     CBC WITH AUTOMATED DIFF    Collection Time: 01/23/21  9:52 PM   Result Value Ref Range    WBC 6.7 4.3 - 11.1 K/uL    RBC 3.53 (L) 4.23 - 5.6 M/uL    HGB 11.0 (L) 13.6 - 17.2 g/dL    HCT 34.4 (L) 41.1 - 50.3 %    MCV 97.5 79.6 - 97.8 FL    MCH 31.2 26.1 - 32.9 PG    MCHC 32.0 31.4 - 35.0 g/dL    RDW 12.5 11.9 - 14.6 %    PLATELET 270 (H) 054 - 450 K/uL    MPV 9.4 9.4 - 12.3 FL    ABSOLUTE NRBC 0.00 0.0 - 0.2 K/uL    DF AUTOMATED      NEUTROPHILS 42 (L) 43 - 78 %    LYMPHOCYTES 41 13 - 44 %    MONOCYTES 10 4.0 - 12.0 %    EOSINOPHILS 6 0.5 - 7.8 %    BASOPHILS 1 0.0 - 2.0 %    IMMATURE GRANULOCYTES 0 0.0 - 5.0 %    ABS. NEUTROPHILS 2.8 1.7 - 8.2 K/UL    ABS. LYMPHOCYTES 2.7 0.5 - 4.6 K/UL    ABS. MONOCYTES 0.7 0.1 - 1.3 K/UL    ABS. EOSINOPHILS 0.4 0.0 - 0.8 K/UL    ABS. BASOPHILS 0.1 0.0 - 0.2 K/UL    ABS. IMM.  GRANS. 0.0 0.0 - 0.5 K/UL   METABOLIC PANEL, COMPREHENSIVE    Collection Time: 01/23/21  9:52 PM   Result Value Ref Range    Sodium 139 136 - 145 mmol/L    Potassium 3.5 3.5 - 5.1 mmol/L    Chloride 106 98 - 107 mmol/L CO2 28 21 - 32 mmol/L    Anion gap 5 (L) 7 - 16 mmol/L    Glucose 101 (H) 65 - 100 mg/dL    BUN 19 8 - 23 MG/DL    Creatinine 1.55 (H) 0.8 - 1.5 MG/DL    GFR est AA 56 (L) >60 ml/min/1.73m2    GFR est non-AA 46 (L) >60 ml/min/1.73m2    Calcium 9.0 8.3 - 10.4 MG/DL    Bilirubin, total 0.4 0.2 - 1.1 MG/DL    ALT (SGPT) 24 12 - 65 U/L    AST (SGOT) 13 (L) 15 - 37 U/L    Alk. phosphatase 83 50 - 136 U/L    Protein, total 7.8 6.3 - 8.2 g/dL    Albumin 3.7 3.2 - 4.6 g/dL    Globulin 4.1 (H) 2.3 - 3.5 g/dL    A-G Ratio 0.9 (L) 1.2 - 3.5     PROCALCITONIN    Collection Time: 01/23/21  9:52 PM   Result Value Ref Range    Procalcitonin <0.05 ng/mL   TROPONIN-HIGH SENSITIVITY    Collection Time: 01/23/21  9:52 PM   Result Value Ref Range    Troponin-High Sensitivity 6.4 0 - 14 pg/mL   LACTIC ACID    Collection Time: 01/23/21 11:57 PM   Result Value Ref Range    Lactic acid 1.0 0.4 - 2.0 MMOL/L         Imaging /Procedures /Studies   All diagnostic imaging personally reviewed by me  Exam: MRI brain without contrast.  Indication: 79year-old with transient ischemic attack, altered mental status,  history of subarachnoid bleed. Patient presented yesterday with episode of  confusion. Patient had a fall 1-2 weeks prior. Comparison: Head CT dated January 23, 2021. Contrast: None.     Standard MRI sequences were obtained through the brain in multiple planes  without contrast.     FINDINGS:  Right anteromedial thalamic restricted diffusion consistent with acute infarct  with corresponding FLAIR hyperintensity. Posterior parasagittal right parietal  cortical restricted diffusion with corresponding FLAIR hyperintensity. Right  occipital lobe focus of restricted diffusion.     Mild diffuse cerebral atrophy. Scattered centrum semiovale and periventricular  white matter T2/FLAIR hyperintensities indicative of chronic ischemic white  matter change.  There is diffusely increased FLAIR signal intensity along the  left cerebral hemisphere subdural space most consistent with a subdural  hematoma. To a lesser extent there is increased FLAIR signal within the  posterior right subdural space most suspicious for subdural hematoma given  corresponding T1 shortening. There is dural thickening along the temporal lobes. No evidence of cerebellar tonsillar herniation. Expected arterial flow voids are  present.        The paranasal sinuses, mastoid air cells, and middle ears are clear. The orbital  contents are within normal limits. No significant osseous or scalp lesions are  identified.        IMPRESSION  1. Right anteromedial thalamic infarction and focal infarcts of the right  posterior paramedian parietal lobe and right occipital lobe. 2. Interval development of a left cerebral convexity subdural hematoma measuring  up to 8 mm in thickness with minimal mass effect upon the left cerebral  hemisphere. Interval development of the posterior right subdural hematoma  measuring up to 5 mm in thickness. Diffuse meningeal thickening about the  temporal lobes likely reactive to subdural hematomas. Noncontrast CT of the brain.      COMPARISON: January 5, 2021     INDICATION: Altered mental status     TECHNIQUE: Contiguous axial images were obtained from the skull base through the  vertex without IV contrast. Radiation dose reduction techniques were used for  this study:  Our CT scanners use one or all of the following: Automated exposure  control, adjustment of the mA and/or kVp according to patient's size, iterative  reconstruction.     FINDINGS:     There is no acute intracranial hemorrhage or evidence for acute territorial  infarction. There is no mass effect, midline shift or hydrocephalus. There is no  extra-axial fluid collection. The cerebellum and brainstem are grossly  unremarkable.     Periventricular diffuse hypodensities are nonspecific and likely secondary to  chronic small vessel changes.  There is old infarct in the left basal ganglia.     Included globes appear intact. The visualized paranasal sinuses and the mastoid  air cells are aerated. There is no skull fracture.        IMPRESSION     1. No CT evidence of acute intracranial process. Previously noted intracranial  hemorrhage is no longer present. ASSESSMENT      Hospital Problems as of 1/24/2021 Date Reviewed: 1/5/2021          Codes Class Noted - Resolved POA    Subdural hematoma ([facility] [address] ICD-10-CM: A80.0E8Q  ICD-9-CM: 432.1  1/24/2021 - Present Unknown        Transient confusion ICD-10-CM: R41.0  ICD-9-CM: 298.9  1/24/2021 - Present Unknown        * (Principal) Acute ischemic stroke [facility]) ICD-10-CM: I63.9  ICD-9-CM: 434.91  1/23/2021 - Present Yes        Morbid obesity due to excess calories (HCC) (Chronic) ICD-10-CM: E66.01  ICD-9-CM: 278.01  10/4/2016 - Present Yes        Essential hypertension (Chronic) ICD-10-CM: I10  ICD-9-CM: 401.9  9/9/2015 - Present Yes                  Plan:  -MRI confirmed acute right occipital and paramedian thalamic stroke. Also with interval development of 8 mm thickness left subdural hematoma which was not present on CT done at admission. ASA on hold  Continue Pravachol 40 mg p.o. nightly  Repeat a stat CT head without contrast to evaluate for subdural hematoma. Neurology consulted. Dr. Rosa Crawford team notified earlier about the patient. Patient recently had a CTA head and neck which did not reveal any intracranial aneurysm. No evidence of any subarachnoid hemorrhage now. Goal BP of less than 140/80  Continue Norvasc 5 mg p.o. daily. Added hydralazine 25 mg p.o. 3 times daily. Continue Diovan HCT  Use as needed IV Lopressor for SBP greater than 150. Continue other home medication as reconciled in [facility]  DVT Prophylaxis: SCD  PT and OT eval  Start IV fluids  Patient is at high risk, will need inpatient monitoring to evaluate for any worsening mentation.   Marie Martines MD

## 2021-01-24 NOTE — PROGRESS NOTES
Daughter Kadie Ritchie called and was given an update on patient status. She stated that his symptoms of confusion and disorientation started around 1 pm Saturday afternoon. She asked that Dr. Humaira Boogie staff be notified of his admission given he was her patient from earlier this month for [facility] and is scheduled for testing and follow up appointment in February. She plans to be here at 2pm for visiting hours and she was given our direct line to our unit.

## 2021-01-25 PROBLEM — I63.9 STROKE (HCC): Status: ACTIVE | Noted: 2021-01-25

## 2021-01-25 LAB
ANION GAP SERPL CALC-SCNC: 5 MMOL/L (ref 7–16)
BUN SERPL-MCNC: 18 MG/DL (ref 8–23)
CALCIUM SERPL-MCNC: 8.8 MG/DL (ref 8.3–10.4)
CHLORIDE SERPL-SCNC: 106 MMOL/L (ref 98–107)
CHOLEST SERPL-MCNC: 133 MG/DL
CO2 SERPL-SCNC: 28 MMOL/L (ref 21–32)
CREAT SERPL-MCNC: 1.42 MG/DL (ref 0.8–1.5)
ERYTHROCYTE [DISTWIDTH] IN BLOOD BY AUTOMATED COUNT: 12.5 % (ref 11.9–14.6)
EST. AVERAGE GLUCOSE BLD GHB EST-MCNC: 105 MG/DL
GLUCOSE SERPL-MCNC: 130 MG/DL (ref 65–100)
HBA1C MFR BLD: 5.3 % (ref 4.2–6.3)
HCT VFR BLD AUTO: 33.2 % (ref 41.1–50.3)
HDLC SERPL-MCNC: 48 MG/DL (ref 40–60)
HDLC SERPL: 2.8 {RATIO}
HGB BLD-MCNC: 10.7 G/DL (ref 13.6–17.2)
LDLC SERPL CALC-MCNC: 72.4 MG/DL
LIPID PROFILE,FLP: NORMAL
MAGNESIUM SERPL-MCNC: 2.2 MG/DL (ref 1.8–2.4)
MCH RBC QN AUTO: 31.2 PG (ref 26.1–32.9)
MCHC RBC AUTO-ENTMCNC: 32.2 G/DL (ref 31.4–35)
MCV RBC AUTO: 96.8 FL (ref 79.6–97.8)
NRBC # BLD: 0 K/UL (ref 0–0.2)
PLATELET # BLD AUTO: 471 K/UL (ref 150–450)
PMV BLD AUTO: 9 FL (ref 9.4–12.3)
POTASSIUM SERPL-SCNC: 3.7 MMOL/L (ref 3.5–5.1)
RBC # BLD AUTO: 3.43 M/UL (ref 4.23–5.6)
SODIUM SERPL-SCNC: 139 MMOL/L (ref 136–145)
T4 FREE SERPL-MCNC: 1.2 NG/DL (ref 0.9–1.8)
TRIGL SERPL-MCNC: 63 MG/DL (ref 35–150)
TSH SERPL DL<=0.005 MIU/L-ACNC: 1.26 UIU/ML (ref 0.36–3.74)
VLDLC SERPL CALC-MCNC: 12.6 MG/DL (ref 6–23)
WBC # BLD AUTO: 5.9 K/UL (ref 4.3–11.1)

## 2021-01-25 PROCEDURE — 84443 ASSAY THYROID STIM HORMONE: CPT

## 2021-01-25 PROCEDURE — 84481 FREE ASSAY (FT-3): CPT

## 2021-01-25 PROCEDURE — 84439 ASSAY OF FREE THYROXINE: CPT

## 2021-01-25 PROCEDURE — 74011250637 HC RX REV CODE- 250/637: Performed by: FAMILY MEDICINE

## 2021-01-25 PROCEDURE — 99218 HC RM OBSERVATION: CPT

## 2021-01-25 PROCEDURE — 83735 ASSAY OF MAGNESIUM: CPT

## 2021-01-25 PROCEDURE — 94640 AIRWAY INHALATION TREATMENT: CPT

## 2021-01-25 PROCEDURE — 36415 COLL VENOUS BLD VENIPUNCTURE: CPT

## 2021-01-25 PROCEDURE — 80048 BASIC METABOLIC PNL TOTAL CA: CPT

## 2021-01-25 PROCEDURE — 65270000029 HC RM PRIVATE

## 2021-01-25 PROCEDURE — 94760 N-INVAS EAR/PLS OXIMETRY 1: CPT

## 2021-01-25 PROCEDURE — 85027 COMPLETE CBC AUTOMATED: CPT

## 2021-01-25 PROCEDURE — 80061 LIPID PANEL: CPT

## 2021-01-25 PROCEDURE — 83036 HEMOGLOBIN GLYCOSYLATED A1C: CPT

## 2021-01-25 PROCEDURE — 74011250637 HC RX REV CODE- 250/637: Performed by: HOSPITALIST

## 2021-01-25 RX ORDER — GUAIFENESIN 100 MG/5ML
81 LIQUID (ML) ORAL DAILY
Status: DISCONTINUED | OUTPATIENT
Start: 2021-01-26 | End: 2021-01-26 | Stop reason: HOSPADM

## 2021-01-25 RX ADMIN — VALSARTAN: 80 TABLET ORAL at 09:04

## 2021-01-25 RX ADMIN — BUDESONIDE AND FORMOTEROL FUMARATE DIHYDRATE 2 PUFF: 160; 4.5 AEROSOL RESPIRATORY (INHALATION) at 22:29

## 2021-01-25 RX ADMIN — Medication 10 ML: at 20:27

## 2021-01-25 RX ADMIN — BUDESONIDE AND FORMOTEROL FUMARATE DIHYDRATE 2 PUFF: 160; 4.5 AEROSOL RESPIRATORY (INHALATION) at 09:09

## 2021-01-25 RX ADMIN — HYDRALAZINE HYDROCHLORIDE 25 MG: 25 TABLET, FILM COATED ORAL at 20:28

## 2021-01-25 RX ADMIN — LATANOPROST 1 DROP: 50 SOLUTION OPHTHALMIC at 20:28

## 2021-01-25 RX ADMIN — HYDRALAZINE HYDROCHLORIDE 25 MG: 25 TABLET, FILM COATED ORAL at 15:55

## 2021-01-25 RX ADMIN — AMLODIPINE BESYLATE 5 MG: 5 TABLET ORAL at 09:03

## 2021-01-25 RX ADMIN — ASPIRIN 325 MG ORAL TABLET 325 MG: 325 PILL ORAL at 09:02

## 2021-01-25 RX ADMIN — Medication 10 ML: at 13:32

## 2021-01-25 RX ADMIN — TAMSULOSIN HYDROCHLORIDE 0.4 MG: 0.4 CAPSULE ORAL at 09:04

## 2021-01-25 RX ADMIN — HYDRALAZINE HYDROCHLORIDE 25 MG: 25 TABLET, FILM COATED ORAL at 09:03

## 2021-01-25 RX ADMIN — PRAVASTATIN SODIUM 40 MG: 20 TABLET ORAL at 20:27

## 2021-01-25 RX ADMIN — Medication 10 ML: at 06:08

## 2021-01-25 RX ADMIN — FAMOTIDINE 20 MG: 20 TABLET, FILM COATED ORAL at 09:06

## 2021-01-25 NOTE — PROGRESS NOTES
Dr. Charisse Rebollar called and stated patient needed a stat repeat of head CT due to findings inconsistent on MRI with previous CT. Dr. Mitchell Wall notified and orders placed. Attempted to contact Makayla Vilchis NP with Dr. [facility] Spicer practice but was unable to reach at this time. Patient was taken to CT by myself and transport. Makayla Vilchis NP returned call and came to CT department to see patient and assess. She stated that ER should have notified their practice of his admission and symptoms. She reinstated his 325mg ASA after reviewing his scans. Patient transported back to [facility]. Patient stable entire time and no distress noted.

## 2021-01-25 NOTE — PROGRESS NOTES
Dr. Dorothea De La Fuente at bedside to round on patient. Patient still having difficulty recalling year and day of the week but otherwise doing well. No physical deficits noted regarding movement of limbs, strength and has normal facial symmetry. No new orders received at this time with the exception of discontinuing ACHS blood sugars since patient is not diabetic.   AM HBA1C ordered in am.

## 2021-01-25 NOTE — PROGRESS NOTES
Efrain Sawyer from MRI asked if she could send for patient for test.  I asked if I could have time to assess patient and get MRI questionnaire completed. She stated she had 2 other patient she could do and then him. Quentin Hopson RN assisted in MRI questionnaire due to primary nurse receiving report on a new admission from ER. Unable to use the signature pad for dual sign and was unable to print questions and answers to send with patient. Efrain Sawyer notified of this issue By Tio Mcadams RN and she said she would obtain signature when patient arrived since patient was oriented to person, place and location.

## 2021-01-25 NOTE — PROGRESS NOTES
Hospitalist Progress Note    2021  Admit Date: 2021  9:38 PM   NAME: Samuel Burden   :  1940   MRN:  [id number]   Attending: Elsy Patel MD  PCP:  Unknown, Provider    SUBJECTIVE:     Samuel Burden is a 25-year-old -American male with history of hypertension, recent subarachnoid and subdural hemorrhage, obesity admitted on  in view of transient acute encephalopathy that started yesterday afternoon. Patient was admitted for TIA work-up. CT head without contrast done on admission was negative for any acute intracranial process and no intracranial hemorrhage was appreciated. MRI done this morning  revealed right anteromedial thalamic infarction and focal infarcts in the right posterior paramedian parietal lobe and right occipital lobe with interval development of left cerebral convexity subdural hematoma measuring 8 mm in thickness with minimal mass-effect upon left cerebral hemisphere. : Patient is comfortably resting in bed, does not complain of any headache, visual field deficits, nausea vomiting, numbness tingling or motor weakness in any part of the body. Discussed about MRI and CT findings. No overnight events or any worsening in clinical symptoms.     Review of Systems negative with exception of pertinent positives noted above      PHYSICAL EXAM       Visit Vitals  /82   Pulse 99   Temp 98.4 °F (36.9 °C)   Resp 18   Ht 5' 7\" (1.702 m)   Wt 117.9 kg (260 lb)   SpO2 93%   BMI 40.72 kg/m²      Temp (24hrs), Av.2 °F (36.8 °C), Min:97.7 °F (36.5 °C), Max:98.5 °F (36.9 °C)    Oxygen Therapy  O2 Sat (%): 93 % (21 0741)  Pulse via Oximetry: 99 beats per minute (21)  O2 Device: Room air (21 8514)    Intake/Output Summary (Last 24 hours) at 2021 0830  Last data filed at 2021 0350  Gross per 24 hour   Intake 240 ml   Output 475 ml   Net -235 ml          General: NAD, alert awake, on room air  Head:  Atraumatic Normocephalic. Eyes:  PERRLA, EOMI, Anicteric. ENT:  No discharges/lesions. Lungs:  CTA Bilaterally. CVS:  Regular rate and rhythm,  No murmur, rub, or gallop, No JVD, No lower   extremity edema. Abdomen: Soft, Non distended, Non tender, Positive bowel sounds. MSK:  No deformities, lesions, Spontaneously moves extremities. Neurologic:  GCS 15, no motor or sensory deficits, cranial nerves II through grossly intact, cerebellar functions intact  Psychiatry:      AO x2 mood and affect flat  Skin:   No rash/lesions. Good skin turgor  Heme/Lymph/Immune:  No petechiae, ecchymoses, overt signs of bleeding or    lymphadenopathy noted.     Recent Results (from the past 24 hour(s))   GLUCOSE, POC    Collection Time: 01/24/21  6:54 PM   Result Value Ref Range    Glucose (POC) 232 (H) 65 - 100 mg/dL   CBC W/O DIFF    Collection Time: 01/25/21  3:41 AM   Result Value Ref Range    WBC 5.9 4.3 - 11.1 K/uL    RBC 3.43 (L) 4.23 - 5.6 M/uL    HGB 10.7 (L) 13.6 - 17.2 g/dL    HCT 33.2 (L) 41.1 - 50.3 %    MCV 96.8 79.6 - 97.8 FL    MCH 31.2 26.1 - 32.9 PG    MCHC 32.2 31.4 - 35.0 g/dL    RDW 12.5 11.9 - 14.6 %    PLATELET 049 (H) 026 - 450 K/uL    MPV 9.0 (L) 9.4 - 12.3 FL    ABSOLUTE NRBC 0.00 0.0 - 0.2 K/uL   LIPID PANEL    Collection Time: 01/25/21  3:41 AM   Result Value Ref Range    LIPID PROFILE          Cholesterol, total 133 <200 MG/DL    Triglyceride 63 35 - 150 MG/DL    HDL Cholesterol 48 40 - 60 MG/DL    LDL, calculated 72.4 <100 MG/DL    VLDL, calculated 12.6 6.0 - 23.0 MG/DL    CHOL/HDL Ratio 2.8     HEMOGLOBIN A1C WITH EAG    Collection Time: 01/25/21  3:41 AM   Result Value Ref Range    Hemoglobin A1c 5.3 4.20 - 6.30 %    Est. average glucose 105 mg/dL   TSH 3RD GENERATION    Collection Time: 01/25/21  3:41 AM   Result Value Ref Range    TSH 1.260 0.358 - 3.740 uIU/mL   T4, FREE    Collection Time: 01/25/21  3:41 AM   Result Value Ref Range    T4, Free 1.2 0.9 - 1.8 NG/DL   METABOLIC PANEL, BASIC    Collection Time: 01/25/21  3:41 AM   Result Value Ref Range    Sodium 139 136 - 145 mmol/L    Potassium 3.7 3.5 - 5.1 mmol/L    Chloride 106 98 - 107 mmol/L    CO2 28 21 - 32 mmol/L    Anion gap 5 (L) 7 - 16 mmol/L    Glucose 130 (H) 65 - 100 mg/dL    BUN 18 8 - 23 MG/DL    Creatinine 1.42 0.8 - 1.5 MG/DL    GFR est AA >60 >60 ml/min/1.73m2    GFR est non-AA 51 (L) >60 ml/min/1.73m2    Calcium 8.8 8.3 - 10.4 MG/DL   MAGNESIUM    Collection Time: 01/25/21  3:41 AM   Result Value Ref Range    Magnesium 2.2 1.8 - 2.4 mg/dL         Imaging /Procedures /Studies   All diagnostic imaging personally reviewed by me  Exam: MRI brain without contrast.  Indication: 79year-old with transient ischemic attack, altered mental status,  history of subarachnoid bleed. Patient presented yesterday with episode of  confusion. Patient had a fall 1-2 weeks prior. Comparison: Head CT dated January 23, 2021. Contrast: None.     Standard MRI sequences were obtained through the brain in multiple planes  without contrast.     FINDINGS:  Right anteromedial thalamic restricted diffusion consistent with acute infarct  with corresponding FLAIR hyperintensity. Posterior parasagittal right parietal  cortical restricted diffusion with corresponding FLAIR hyperintensity. Right  occipital lobe focus of restricted diffusion.     Mild diffuse cerebral atrophy. Scattered centrum semiovale and periventricular  white matter T2/FLAIR hyperintensities indicative of chronic ischemic white  matter change. There is diffusely increased FLAIR signal intensity along the  left cerebral hemisphere subdural space most consistent with a subdural  hematoma. To a lesser extent there is increased FLAIR signal within the  posterior right subdural space most suspicious for subdural hematoma given  corresponding T1 shortening. There is dural thickening along the temporal lobes. No evidence of cerebellar tonsillar herniation.  Expected arterial flow voids are  present.        The paranasal sinuses, mastoid air cells, and middle ears are clear. The orbital  contents are within normal limits. No significant osseous or scalp lesions are  identified.        IMPRESSION  1. Right anteromedial thalamic infarction and focal infarcts of the right  posterior paramedian parietal lobe and right occipital lobe. 2. Interval development of a left cerebral convexity subdural hematoma measuring  up to 8 mm in thickness with minimal mass effect upon the left cerebral  hemisphere. Interval development of the posterior right subdural hematoma  measuring up to 5 mm in thickness. Diffuse meningeal thickening about the  temporal lobes likely reactive to subdural hematomas. Noncontrast CT of the brain.      COMPARISON: January 5, 2021     INDICATION: Altered mental status     TECHNIQUE: Contiguous axial images were obtained from the skull base through the  vertex without IV contrast. Radiation dose reduction techniques were used for  this study:  Our CT scanners use one or all of the following: Automated exposure  control, adjustment of the mA and/or kVp according to patient's size, iterative  reconstruction.     FINDINGS:     There is no acute intracranial hemorrhage or evidence for acute territorial  infarction. There is no mass effect, midline shift or hydrocephalus. There is no  extra-axial fluid collection. The cerebellum and brainstem are grossly  unremarkable.     Periventricular diffuse hypodensities are nonspecific and likely secondary to  chronic small vessel changes. There is old infarct in the left basal ganglia.     Included globes appear intact. The visualized paranasal sinuses and the mastoid  air cells are aerated. There is no skull fracture.        IMPRESSION     1. No CT evidence of acute intracranial process. Previously noted intracranial  hemorrhage is no longer present.   ASSESSMENT      Hospital Problems as of 1/25/2021 Date Reviewed: 1/5/2021          Codes Class Noted - Resolved POA Subdural hematoma (HCC) ICD-10-CM: O34.1R5F  ICD-9-CM: 432.1  1/24/2021 - Present Unknown        Transient confusion ICD-10-CM: R41.0  ICD-9-CM: 298.9  1/24/2021 - Present Unknown        * (Principal) Acute ischemic stroke [facility]) ICD-10-CM: I63.9  ICD-9-CM: 434.91  1/23/2021 - Present Yes        Morbid obesity due to excess calories (HCC) (Chronic) ICD-10-CM: E66.01  ICD-9-CM: 278.01  10/4/2016 - Present Yes        Essential hypertension (Chronic) ICD-10-CM: I10  ICD-9-CM: 401.9  9/9/2015 - Present Yes                  Plan:  -Discussed with patient's daughter about recommendations from Dr. Ronna Diggs. Memory issues following subarachnoid hemorrhage is quite common and may last for about 3 to 6 weeks. Currently there is no evidence of acute subdural hematoma and what was appreciated on MRI was old blood. Plan to resume aspirin 80 mg p.o. daily  Continue Pravachol 40 mg p.o. nightly    Goal BP of less than 140/80  Continue Norvasc 5 mg p.o. daily, hydralazine 25 mg p.o. 3 times daily. Continue Diovan HCT  Use as needed IV Lopressor for SBP greater than 150. Continue other home medication as reconciled in [facility]  DVT Prophylaxis: SCD  PT and OT eval  Continue close monitoring for any clinical changes. Plan to discharge patient tomorrow home with home health aide PT OT. Plan of care discussed with patient's daughter.   Cintia Medrano MD

## 2021-01-25 NOTE — PROGRESS NOTES
Daughter called at this time requesting psych eval for pt before he is discharged. Daughter is concerned about his mental state and wants to [facility] sure he is ok before he comes home\". Explained to daughter that I would pass this on to primary RN for day shift and night shift.

## 2021-01-26 ENCOUNTER — HOME HEALTH ADMISSION (OUTPATIENT)
Dept: HOME HEALTH SERVICES | Facility: HOME HEALTH | Age: 81
End: 2021-01-26
Payer: MEDICARE

## 2021-01-26 VITALS
DIASTOLIC BLOOD PRESSURE: 71 MMHG | BODY MASS INDEX: 40.81 KG/M2 | SYSTOLIC BLOOD PRESSURE: 119 MMHG | HEART RATE: 110 BPM | HEIGHT: 67 IN | TEMPERATURE: 98 F | OXYGEN SATURATION: 97 % | WEIGHT: 260 LBS | RESPIRATION RATE: 18 BRPM

## 2021-01-26 LAB — T3FREE SERPL-MCNC: 2.9 PG/ML (ref 2–4.4)

## 2021-01-26 PROCEDURE — 97165 OT EVAL LOW COMPLEX 30 MIN: CPT

## 2021-01-26 PROCEDURE — 97535 SELF CARE MNGMENT TRAINING: CPT

## 2021-01-26 PROCEDURE — 97161 PT EVAL LOW COMPLEX 20 MIN: CPT

## 2021-01-26 PROCEDURE — 74011250637 HC RX REV CODE- 250/637: Performed by: HOSPITALIST

## 2021-01-26 PROCEDURE — 97530 THERAPEUTIC ACTIVITIES: CPT

## 2021-01-26 PROCEDURE — 74011250637 HC RX REV CODE- 250/637: Performed by: FAMILY MEDICINE

## 2021-01-26 RX ADMIN — TAMSULOSIN HYDROCHLORIDE 0.4 MG: 0.4 CAPSULE ORAL at 08:21

## 2021-01-26 RX ADMIN — HYDRALAZINE HYDROCHLORIDE 25 MG: 25 TABLET, FILM COATED ORAL at 08:22

## 2021-01-26 RX ADMIN — HYDRALAZINE HYDROCHLORIDE 25 MG: 25 TABLET, FILM COATED ORAL at 15:27

## 2021-01-26 RX ADMIN — Medication 10 ML: at 14:45

## 2021-01-26 RX ADMIN — BUDESONIDE AND FORMOTEROL FUMARATE DIHYDRATE 2 PUFF: 160; 4.5 AEROSOL RESPIRATORY (INHALATION) at 08:10

## 2021-01-26 RX ADMIN — ASPIRIN 81 MG CHEWABLE TABLET 81 MG: 81 TABLET CHEWABLE at 08:21

## 2021-01-26 RX ADMIN — VALSARTAN: 80 TABLET ORAL at 08:21

## 2021-01-26 RX ADMIN — FAMOTIDINE 20 MG: 20 TABLET, FILM COATED ORAL at 08:21

## 2021-01-26 RX ADMIN — Medication 10 ML: at 05:18

## 2021-01-26 RX ADMIN — AMLODIPINE BESYLATE 5 MG: 5 TABLET ORAL at 08:21

## 2021-01-26 NOTE — PROGRESS NOTES
ACUTE OT GOALS:  (Developed with and agreed upon by patient and/or caregiver.)  1. Patient will complete total body bathing and dressing with modified independence and adaptive equipment as needed. 2. Patient will complete toileting with modified independence and adaptive equipment as needed. 3. Patient will tolerate 30 minutes of OT treatment with up to 2 rest breaks to increase activity tolerance for ADLs. 4. Patient will complete functional mobility for ADLs with modified independence and adaptive equipment as needed. Timeframe: 7 visits     OCCUPATIONAL THERAPY ASSESSMENT: Initial Assessment and Daily Note OT Treatment Day # 1    Karely Campbell is a 79 y.o. male   PRIMARY DIAGNOSIS: <principal problem not specified>  Transient alteration of awareness [R40.4]  Stroke ([facility] [address] [I63.9]       Reason for Referral:  TIA  ICD-10: Treatment Diagnosis: Generalized Muscle Weakness (M62.81)  [place] / [place] / Product Type: Medicare /   ASSESSMENT:     REHAB RECOMMENDATIONS:   Recommendation to date pending progress:  [address] [place] [address] Therapy vs no OT needs at d/c  Equipment:    None     PRIOR LEVEL OF FUNCTION:  (Prior to Hospitalization)  INITIAL/CURRENT LEVEL OF FUNCTION:  (Based on today's evaluation)   Bathing:   Independent  Dressing:   Independent  Feeding/Grooming:   Independent  Toileting:   Independent  Functional Mobility:   Independent Bathing:   Standby Assistance  Dressing:   Standby Assistance  Feeding/Grooming:   Standby Assistance  Toileting:   Standby Assistance  Functional Mobility:   Standby Assistance     ASSESSMENT:  Mr. Lucy Dominguez presents with confusion, TIA. Hx recent [facility] with subsequent memory deficits. At baseline pt lives alone, completes I/ADLs, ambulation, and driving with independence. Pt practiced functional transfers and ambulation with SBA, grooming in standing with SBA.  Practiced total body dressing with SBA after set up. Pt functioning near baseline and would benefit from continued OT to increase safety and independence. SUBJECTIVE:   Mr. Prince Phan states, \"I'm freezing. \"    SOCIAL HISTORY/LIVING ENVIRONMENT: Hx recent [address] with subsequent memory deficits. At baseline pt lives alone, completes I/ADLs, ambulation, and driving with independence.      Home Environment: Private residence  # Steps to Enter: 2  One/Two Story Residence: Two story  Living Alone: Yes  Support Systems: Child(sonu)    OBJECTIVE:     PAIN: VITAL SIGNS: LINES/DRAINS:   Pre Treatment: Pain Screen  Pain Scale 1: Numeric (0 - 10)  Pain Intensity 1: 0  Post Treatment: same Vital Signs  O2 Device: Room air   O2 Device: Room air     GROSS EVALUATION:  BUE Within Functional Limits Abnormal/ Functional Abnormal/ Non-Functional (see comments) Not Tested Comments:   AROM [x] [] [] []    PROM [x] [] [] []    Strength [x] [] [] []    Balance [] [x] [] [] Fair+ standing without AD   Posture [x] [] [] []    Sensation [x] [] [] []    Coordination [x] [] [] []    Tone [x] [] [] []    Edema [x] [] [] []    Activity Tolerance [x] [] [] []     [] [] [] []      COGNITION/  PERCEPTION: Intact Impaired   (see comments) Comments:   Orientation [] []    Vision [x] []    Hearing [x] []    Judgment/ Insight [] []    Attention [] []    Memory [] [] Memory changes since [address] per chart   Command Following [x] []    Emotional Regulation [x] []     [] []      ACTIVITIES OF DAILY LIVING: I Mod I S SBA CGA Min Mod Max Total NT Comments   BASIC ADLs:              Bathing/ Showering [] [] [] [] [] [] [] [] [] [x]    Toileting [] [] [] [] [] [] [] [] [] [x]    Dressing [] [] [] [x] [] [] [] [] [] []    Feeding [] [] [] [] [] [] [] [] [] [x]    Grooming [] [] [] [x] [] [] [] [] [] []    Personal Device Care [] [] [] [] [] [] [] [] [] [x]    Functional Mobility [] [] [] [x] [] [] [] [] [] []    I=Independent, Mod I=Modified Independent, S=Supervision, SBA=Standby Assistance, CGA=Contact Guard Assistance,   Min=Minimal Assistance, Mod=Moderate Assistance, Max=Maximal Assistance, Total=Total Assistance, NT=Not Tested    MOBILITY: I Mod I S SBA CGA Min Mod Max Total  NT x2 Comments:   Supine to sit [] [] [] [x] [] [] [] [] [] [] []    Sit to supine [] [] [] [] [] [] [] [] [] [x] []    Sit to stand [] [] [] [x] [] [] [] [] [] [] []    Bed to chair [] [] [] [x] [] [] [] [] [] [] []    I=Independent, Mod I=Modified Independent, S=Supervision, SBA=Standby Assistance, CGA=Contact Guard Assistance,   Min=Minimal Assistance, Mod=Moderate Assistance, Max=Maximal Assistance, Total=Total Assistance, NT=Not [address] [address]   Daily Activity Inpatient Short Form        How much help from another person does the patient currently need. .. Total A Lot A Little None   1. Putting on and taking off regular lower body clothing? [] 1   [] 2   [x] 3   [] 4   2. Bathing (including washing, rinsing, drying)? [] 1   [] 2   [x] 3   [] 4   3. Toileting, which includes using toilet, bedpan or urinal?   [] 1   [] 2   [x] 3   [] 4   4. Putting on and taking off regular upper body clothing? [] 1   [] 2   [x] 3   [] 4   5. Taking care of personal grooming such as brushing teeth? [] 1   [] 2   [x] 3   [] 4   6. Eating meals? [] 1   [] 2   [] 3   [x] 4   © 2007, Trustees of [address] [place] [address] [address] [address], under license to [employer]. All rights reserved     Score:  Initial: 19 1/26/21 Most Recent: X (Date: -- )   Interpretation of Tool:  Represents activities that are increasingly more difficult (i.e. Bed mobility, Transfers, Gait). PLAN:   FREQUENCY/DURATION: OT Plan of Care: 3 times/week for duration of hospital stay or until stated goals are met, whichever comes first.    PROBLEM LIST:   (Skilled intervention is medically necessary to address:)  1. Decreased ADL/Functional Activities  2. Decreased Activity Tolerance  3. Decreased Balance  4. Decreased Cognition  5.  Decreased Gait Ability  6. Decreased Strength  7. Decreased Transfer Abilities   INTERVENTIONS PLANNED:   (Benefits and precautions of occupational therapy have been discussed with the patient.)  1. Self Care Training  2. Therapeutic Activity  3. Therapeutic Exercise/HEP  4. Neuromuscular Re-education  5. Education     TREATMENT:     EVALUATION: Low Complexity : (Untimed Charge)    TREATMENT:   ($$ Self Care/Home Management: 8-22 mins    )  Co-Treatment PT/OT necessary due to patient's decreased overall endurance/tolerance levels, as well as need for high level skilled assistance to complete functional transfers/mobility and functional tasks  Self Care (8 Minutes): Self care including Upper Body Dressing, Lower Body Dressing and Grooming to increase independence and decrease level of assistance required.     AFTER TREATMENT POSITION/PRECAUTIONS:  Chair and Needs within reach    INTERDISCIPLINARY COLLABORATION:  RN/PCT, PT/PTA and OT/RG    TOTAL TREATMENT DURATION:  OT Patient Time In/Time Out  Time In: 1318  Time Out: [facility] [place] [address], OTR/L

## 2021-01-26 NOTE — PROGRESS NOTES
ACUTE PHYSICAL THERAPY GOALS:  (Developed with and agreed upon by patient and/or caregiver. )  LTG:  (1.)Mr. Joey Mckoy will move from supine to sit and sit to supine, scoot up and down and roll side to side in bed INDEPENDENTLY within 7 treatment day(s). (2.)Mr. Joey Mckoy will transfer from bed to chair and chair to bed INDEPENDENTLY within 7 treatment day(s). (3.)Mr. Joey Mckyo will ambulate with INDEPENDENT for 500+ feet with the least restrictive device within 7 treatment day(s). (4.)Mr. Joey Mckoy will ascend and descend 10 steps with STAND BY ASSIST using handrail(s) within 7 days. ________________________________________________________________________________________________    PHYSICAL THERAPY ASSESSMENT: Initial Assessment and PM PT Treatment Day # 1      Jose L Leal is a 79 y.o. male   PRIMARY DIAGNOSIS: <principal problem not specified>  Transient alteration of awareness [R40.4]  [facility] [I[facility]]       Reason for Referral:  Confusion, dc planning  ICD-10: Treatment Diagnosis: Generalized Muscle Weakness (M62.81)  Difficulty in walking, Not elsewhere classified (R26.2)  Other abnormalities of gait and mobility (R26.89)  [place] / Product Type: Medicare /     ASSESSMENT:     REHAB RECOMMENDATIONS:   Recommendation to date pending progress:  [address] [place] [address]  Equipment:    None     PRIOR LEVEL OF FUNCTION:  (Prior to Hospitalization) INITIAL/CURRENT LEVEL OF FUNCTION:  (Most Recently Demonstrated)   Bed Mobility:   Independent  Sit to Stand:   Independent  Transfers:   Independent  Gait/Mobility:   Independent Bed Mobility:   Independent  Sit to Stand:   Independent  Transfers:   Independent  Gait/Mobility:   Supervision     ASSESSMENT:  Mr. Joey Mckoy is admitted with transient altered mental status in setting of recent SDH, which is to be expected per MD notes.  Pt lives alone with two children locally who can assist as needed. Presents pleasant and oriented. Performs bed mobility and transfers independently. Ambulatory around unit with supervision-independence without DME use, no loss of balance noted. Min verbal cues for posture, gait safety. Standing functional activities with supervision and cues for use of support as needed and for body mechanics. Pt seems close to baseline, may benefit from [place] at [place] to maximize safety/independence with mobility. SUBJECTIVE:   Mr. Iglesia Albarran states, \"I can walk. \"    SOCIAL HISTORY/LIVING ENVIRONMENT: Lives alone. Two story home. Fully independent without DME use.   Home Environment: Private residence  # Steps to Enter: 2  One/Two Story Residence: Two story  Living Alone: Yes  Support Systems: Child(sonu), Family member(s)  OBJECTIVE:     PAIN: VITAL SIGNS: LINES/DRAINS:   Pre Treatment: Pain Screen  Pain Scale 1: Numeric (0 - 10)  Pain Intensity 1: 0  Post Treatment: 0/10 Vital Signs  O2 Device: Room air none  O2 Device: Room air     GROSS EVALUATION:   Within Functional Limits Abnormal/ Functional Abnormal/ Non-Functional (see comments) Not Tested Comments:   AROM [x] [] [] []    PROM [] [] [] []    Strength [x] [] [] []    Balance [x] [] [] []    Posture [] [] [] []    Sensation [] [] [] []    Coordination [x] [] [] []    Tone [] [] [] []    Edema [] [] [] []    Activity Tolerance [] [x] [] []     [] [] [] []      COGNITION/  PERCEPTION: Intact Impaired   (see comments) Comments:   Orientation [x] []    Vision [x] []    Hearing [x] []    Command Following [x] []    Safety Awareness [x] []     [] []      MOBILITY: I Mod I S SBA CGA Min Mod Max Total  NT x2 Comments:   Bed Mobility    Rolling [x] [] [] [] [] [] [] [] [] [] []    Supine to Sit [x] [] [] [] [] [] [] [] [] [] []    Scooting [x] [] [] [] [] [] [] [] [] [] []    Sit to Supine [] [] [] [] [] [] [] [] [] [] []    Transfers    Sit to Stand [x] [] [] [] [] [] [] [] [] [] []    Bed to Chair [x] [] [x] [] [] [] [] [] [] [] []    Stand to Sit [x] [] [] [] [] [] [] [] [] [] []    I=Independent, Mod I=Modified Independent, S=Supervision, SBA=Standby Assistance, CGA=Contact Guard Assistance,   Min=Minimal Assistance, Mod=Moderate Assistance, Max=Maximal Assistance, Total=Total Assistance, NT=Not Tested  GAIT: I Mod I S SBA CGA Min Mod Max Total  NT x2 Comments:   Level of Assistance [x] [] [x] [] [] [] [] [] [] [] []    Distance 200    DME N/A    Gait Quality Mild path deviations, no LOB    Weightbearing Status N/A     I=Independent, Mod I=Modified Independent, S=Supervision, SBA=Standby Assistance, CGA=Contact Guard Assistance,   Min=Minimal Assistance, Mod=Moderate Assistance, Max=Maximal Assistance, Total=Total Assistance, NT=Not Tested    [place] Form       How much difficulty does the patient currently have. .. Unable A Lot A Little None   1. Turning over in bed (including adjusting bedclothes, sheets and blankets)? [] 1   [] 2   [] 3   [x] 4   2. Sitting down on and standing up from a chair with arms ( e.g., wheelchair, bedside commode, etc.)   [] 1   [] 2   [] 3   [x] 4   3. Moving from lying on back to sitting on the side of the bed? [] 1   [] 2   [] 3   [x] 4   How much help from another person does the patient currently need. .. Total A Lot A Little None   4. Moving to and from a bed to a chair (including a wheelchair)? [] 1   [] 2   [] 3   [x] 4   5. Need to walk in hospital room? [] 1   [] 2   [x] 3   [] 4   6. Climbing 3-5 steps with a railing? [] 1   [] 2   [x] 3   [] 4   © 2007, Trustees of [address] [place] [address] [address] [address], under license to [employer]. All rights reserved     Score:  Initial: 22 Most Recent: X (Date: -- )    Interpretation of Tool:  Represents activities that are increasingly more difficult (i.e. Bed mobility, Transfers, Gait).     PLAN:   FREQUENCY/DURATION: PT Plan of Care: 3 times/week for duration of hospital stay or until stated goals are met, whichever comes first.    PROBLEM LIST:   (Skilled intervention is medically necessary to address:)  1. Decreased Activity Tolerance  2. Decreased Balance  3. Decreased Gait Ability  4. Decreased Strength  5. Decreased Transfer Abilities   INTERVENTIONS PLANNED:   (Benefits and precautions of physical therapy have been discussed with the patient.)  1. Therapeutic Activity  2. Therapeutic Exercise/HEP  3. Neuromuscular Re-education  4. Gait Training  5. Manual Therapy  6. Education     TREATMENT:     EVALUATION: Low Complexity : (Untimed Charge)    TREATMENT:   ($$ Therapeutic Activity: 8-22 mins    )  Co-Treatment PT/OT necessary due to patient's decreased overall endurance/tolerance levels, as well as need for high level skilled assistance to complete functional transfers/mobility and functional tasks  Therapeutic Activity (8 Minutes): Therapeutic activity included Rolling, Supine to Sit, Transfer Training, Ambulation on level ground, Sitting balance , Standing balance and standing static/dynamic functional activities to improve functional Mobility, Strength, Activity tolerance and balance.     AFTER TREATMENT POSITION/PRECAUTIONS:  Chair, Needs within reach and RN notified    INTERDISCIPLINARY COLLABORATION:  RN/PCT, PT/PTA and OT/RG    TOTAL TREATMENT DURATION:  PT Patient Time In/Time Out  Time In: 1318  Time Out: [address], DPT

## 2021-01-26 NOTE — PROGRESS NOTES
All of his discharge instructions were discussed with his daughter on the phone at great length and all her concerns were addressed. All of his paper work was put with his belongings for him to take home with him. His iv was d/c without any difficulty. He continues to be alert and oriented to self, , place but is forgetful about times and events.

## 2021-01-26 NOTE — DISCHARGE SUMMARY
Hospitalist Discharge Summary     Patient ID:  Brionna Ramsay  [id number]  85 y.o.  1940  Admit date: 1/23/2021  9:38 PM  Discharge date and time: 1/26/2021  Attending: Eliot Ledezma MD  PCP:  Claudean Bill, MD  Treatment Team: Attending Provider: Eliot Ledezma MD; Utilization Review: Adelfo Bueno; Care Manager: Alivia Franklin; Primary Nurse: Crystal Paredes; Hospitalist: Eliot Ledezma MD    Principal Diagnosis <principal problem not specified>   Active Problems:    Essential hypertension (9/9/2015)      Morbid obesity due to excess calories ([address] (10/4/2016)      Subdural hematoma ([address] (1/24/2021)      Transient confusion (1/24/2021)      Stroke ([facility] [address] (1/25/2021)             Hospital Course:  Please refer to the admission H&P for details of presentation. In summary, the patient is 78-year-old -American male with history of hypertension, recent subarachnoid and subdural hemorrhage, obesity admitted on 1/23 in view of transient acute encephalopathy that started yesterday afternoon. Patient was admitted for TIA work-up. CT head without contrast done on admission was negative for any acute intracranial process and no intracranial hemorrhage was appreciated. MRI done this morning 1/24 revealed right anteromedial thalamic infarction and focal infarcts in the right posterior paramedian parietal lobe and right occipital lobe with interval development of left cerebral convexity subdural hematoma measuring 8 mm in thickness with minimal mass-effect upon left cerebral hemisphere. Discussed with patient's daughter about recommendations from Dr. Shivani Kelley. Memory issues following subarachnoid hemorrhage is quite common and may last for about 3 to 6 weeks. Currently there is no evidence of acute subdural hematoma and what was appreciated on MRI was old blood. Plan to resume aspirin 81 mg p.o. daily and Continue Pravachol 40 mg p.o. nightly.   Pt will get tele psych eval per daughter's request. I have counseled her that pt may continue to have intermittent memory issues and at some point pt will benefit from having dementia work up. Pt is doing well and is hemodynamically stable to be discharged today. During prior hospitalization, pt declined to have HHA/PT. Pt's daughter is in favor of having it arranged for the pt. Rest of the hospital course was uneventful, for further details, please refer to daily progress notes. Significant Diagnostic Studies:   Exam: MRI brain without contrast.  Indication: 79year-old with transient ischemic attack, altered mental status,  history of subarachnoid bleed. Patient presented yesterday with episode of  confusion. Patient had a fall 1-2 weeks prior. Comparison: Head CT dated January 23, 2021. Contrast: None.     Standard MRI sequences were obtained through the brain in multiple planes  without contrast.     FINDINGS:  Right anteromedial thalamic restricted diffusion consistent with acute infarct  with corresponding FLAIR hyperintensity. Posterior parasagittal right parietal  cortical restricted diffusion with corresponding FLAIR hyperintensity. Right  occipital lobe focus of restricted diffusion.     Mild diffuse cerebral atrophy. Scattered centrum semiovale and periventricular  white matter T2/FLAIR hyperintensities indicative of chronic ischemic white  matter change. There is diffusely increased FLAIR signal intensity along the  left cerebral hemisphere subdural space most consistent with a subdural  hematoma. To a lesser extent there is increased FLAIR signal within the  posterior right subdural space most suspicious for subdural hematoma given  corresponding T1 shortening. There is dural thickening along the temporal lobes. No evidence of cerebellar tonsillar herniation. Expected arterial flow voids are  present.        The paranasal sinuses, mastoid air cells, and middle ears are clear. The orbital  contents are within normal limits.  No significant osseous or scalp lesions are  identified.        IMPRESSION  1. Right anteromedial thalamic infarction and focal infarcts of the right  posterior paramedian parietal lobe and right occipital lobe. 2. Interval development of a left cerebral convexity subdural hematoma measuring  up to 8 mm in thickness with minimal mass effect upon the left cerebral  hemisphere. Interval development of the posterior right subdural hematoma  measuring up to 5 mm in thickness. Diffuse meningeal thickening about the  temporal lobes likely reactive to subdural hematomas.     Noncontrast CT of the brain.      COMPARISON: January 5, 2021     INDICATION: Altered mental status     TECHNIQUE: Contiguous axial images were obtained from the skull base through the  vertex without IV contrast. Radiation dose reduction techniques were used for  this study:  Our CT scanners use one or all of the following: Automated exposure  control, adjustment of the mA and/or kVp according to patient's size, iterative  reconstruction.     FINDINGS:     There is no acute intracranial hemorrhage or evidence for acute territorial  infarction. There is no mass effect, midline shift or hydrocephalus. There is no  extra-axial fluid collection. The cerebellum and brainstem are grossly  unremarkable.     Periventricular diffuse hypodensities are nonspecific and likely secondary to  chronic small vessel changes. There is old infarct in the left basal ganglia.     Included globes appear intact. The visualized paranasal sinuses and the mastoid  air cells are aerated. There is no skull fracture.        IMPRESSION     1. No CT evidence of acute intracranial process. Previously noted intracranial  hemorrhage is no longer present.     Labs: Results:       Chemistry Recent Labs     01/25/21  0341 01/23/21  2152   * 101*    139   K 3.7 3.5    106   CO2 28 28   BUN 18 19   CREA 1.42 1.55*   CA 8.8 9.0   AGAP 5* 5*   AP  --  83   TP  --  7.8   ALB  --  3.7 GLOB  --  4.1*   AGRAT  --  0.9*      CBC w/Diff Recent Labs     01/25/21  0341 01/23/21 2152   WBC 5.9 6.7   RBC 3.43* 3.53*   HGB 10.7* 11.0*   HCT 33.2* 34.4*   * 466*   GRANS  --  42*   LYMPH  --  41   EOS  --  6      Cardiac Enzymes No results for input(s): CPK, CKND1, ED in the last 72 hours. No lab exists for component: CKRMB, TROIP   Coagulation Recent Labs     01/23/21 2145   INR 1.1       Lipid Panel Lab Results   Component Value Date/Time    Cholesterol, total 133 01/25/2021 03:41 AM    HDL Cholesterol 48 01/25/2021 03:41 AM    LDL, calculated 72.4 01/25/2021 03:41 AM    VLDL, calculated 12.6 01/25/2021 03:41 AM    Triglyceride 63 01/25/2021 03:41 AM    CHOL/HDL Ratio 2.8 01/25/2021 03:41 AM      BNP No results for input(s): BNPP in the last 72 hours. Liver Enzymes Recent Labs     01/23/21 2152   TP 7.8   ALB 3.7   AP 83      Thyroid Studies Lab Results   Component Value Date/Time    TSH 1.260 01/25/2021 03:41 AM            Discharge Exam:  Visit Vitals  /76   Pulse (!) 104   Temp 98.4 °F (36.9 °C)   Resp 18   Ht 5' 7\" (1.702 m)   Wt 117.9 kg (260 lb)   SpO2 95%   BMI 40.72 kg/m²     General appearance: alert, cooperative, NAD, on RA, obese  Lungs: clear to auscultation bilaterally  Heart: regular rate and rhythm, S1, S2 normal, no murmur, click, rub or gallop  Abdomen: soft, non-tender. Bowel sounds normal. No masses,  no organomegaly  Extremities: no cyanosis or edema  Neurologic: Grossly normal    Disposition: home with HHA/PT  Discharge Condition: stable  Patient Instructions:   Current Discharge Medication List      CONTINUE these medications which have NOT CHANGED    Details   amLODIPine (NORVASC) 5 mg tablet Take 1 Tab by mouth daily. Qty: 90 Tab, Refills: 1    Associated Diagnoses: SAH (subarachnoid hemorrhage) (HCC)      butalbital-acetaminophen-caffeine (FIORICET, ESGIC) -40 mg per tablet Take 1 Tab by mouth every four (4) hours as needed for Headache.   Qty: 30 Tab, Refills: 1    Associated Diagnoses: SAH (subarachnoid hemorrhage) (HCC)      telmisartan-hydroCHLOROthiazide (MICARDIS HCT) 40-12.5 mg per tablet Take 1 Tab by mouth daily. tamsulosin (FLOMAX) 0.4 mg capsule Take 0.4 mg by mouth daily. pravastatin (PRAVACHOL) 40 mg tablet Take 40 mg by mouth nightly. aspirin 81 mg chewable tablet Take 81 mg by mouth daily. latanoprost (XALATAN) 0.005 % ophthalmic solution Administer 1 Drop to both eyes nightly. budesonide-formoteroL (Symbicort) 160-4.5 mcg/actuation HFAA Take 2 Puffs by inhalation.              Activity: PT/OT per Home Health  Diet: Cardiac Diet  Wound Care: None needed    Follow-up  ·   Follow up with PCP in 2 weeks  · Follow up with Dr. Sergio Alexander in 3-4 weeks as scheduled  Time spent to discharge patient 35 minutes  Signed:  Lenore Sandoval MD  1/26/2021  8:27 AM

## 2021-01-26 NOTE — PROGRESS NOTES
CM following patient's chart. Per MD, patient will be up for d/c soon and will be going home with PT/OT HH. CM placed consults for PT & OT.    CM continues to follow to assist with [facility] referral.     9:45 - CM spoke with patient's daughter and son via phone in reference to [facility]. They agree to [facility] recommendation and stated that they were going to look into caregiving services for the patient, as he lives alone. CM waiting for PT and OT consults. 12:15 - CM spoke with PT, they advised they would see the patient after lunch. Also advised that they would pass information on to OT and ask them to do the same thing. CM spoke with patient. Patient agreeable to recommendation and referral being sent to [facility]. Order and referral will be placed after PT & OT consults completed. Order/referral sent. CM CM did speak with the patient's daughter and advised her of the conversation between me and her father. No other needs have been identified at this time. Care Management Interventions  PCP Verified by CM: Lele Head MD)  Mode of Transport at Discharge:  Other (see comment)(Family)  Transition of Care Consult (CM Consult): Discharge Planning  Discharge Durable Medical Equipment: No  Physical Therapy Consult: No  Occupational Therapy Consult: No  Speech Therapy Consult: No  Current Support Network: Own Home, Lives Alone  Confirm Follow Up Transport: Family  The Patient and/or Patient Representative was Provided with a Choice of Provider and Agrees with the Discharge Plan?: Yes  Freedom of Choice List was Provided with Basic Dialogue that Supports the Patient's Individualized Plan of Care/Goals, Treatment Preferences and Shares the Quality Data Associated with the Providers?: Yes   Resource Information Provided?: No  Discharge Location  Discharge Placement: Home with home health([facility])

## 2021-01-27 ENCOUNTER — HOME CARE VISIT (OUTPATIENT)
Dept: SCHEDULING | Facility: HOME HEALTH | Age: 81
End: 2021-01-27
Payer: MEDICARE

## 2021-01-27 VITALS
DIASTOLIC BLOOD PRESSURE: 78 MMHG | HEART RATE: 78 BPM | TEMPERATURE: 97.8 F | OXYGEN SATURATION: 98 % | RESPIRATION RATE: 18 BRPM | SYSTOLIC BLOOD PRESSURE: 132 MMHG

## 2021-01-27 PROCEDURE — 400018 HH-NO PAY CLAIM PROCEDURE

## 2021-01-27 PROCEDURE — 400013 HH SOC

## 2021-01-27 PROCEDURE — G0151 HHCP-SERV OF PT,EA 15 MIN: HCPCS

## 2021-01-27 PROCEDURE — 3331090002 HH PPS REVENUE DEBIT

## 2021-01-27 PROCEDURE — 3331090001 HH PPS REVENUE CREDIT

## 2021-01-28 ENCOUNTER — HOME CARE VISIT (OUTPATIENT)
Dept: SCHEDULING | Facility: HOME HEALTH | Age: 81
End: 2021-01-28
Payer: MEDICARE

## 2021-01-28 PROCEDURE — 3331090001 HH PPS REVENUE CREDIT

## 2021-01-28 PROCEDURE — G0152 HHCP-SERV OF OT,EA 15 MIN: HCPCS

## 2021-01-28 PROCEDURE — 3331090002 HH PPS REVENUE DEBIT

## 2021-01-29 ENCOUNTER — HOME CARE VISIT (OUTPATIENT)
Dept: SCHEDULING | Facility: HOME HEALTH | Age: 81
End: 2021-01-29
Payer: MEDICARE

## 2021-01-29 VITALS
HEART RATE: 96 BPM | SYSTOLIC BLOOD PRESSURE: 136 MMHG | DIASTOLIC BLOOD PRESSURE: 72 MMHG | OXYGEN SATURATION: 98 % | TEMPERATURE: 97.9 F | RESPIRATION RATE: 17 BRPM

## 2021-01-29 LAB
BACTERIA SPEC CULT: NORMAL
SERVICE CMNT-IMP: NORMAL

## 2021-01-29 PROCEDURE — 3331090002 HH PPS REVENUE DEBIT

## 2021-01-29 PROCEDURE — 3331090001 HH PPS REVENUE CREDIT

## 2021-01-29 PROCEDURE — G0157 HHC PT ASSISTANT EA 15: HCPCS

## 2021-01-30 PROCEDURE — 3331090001 HH PPS REVENUE CREDIT

## 2021-01-30 PROCEDURE — 3331090002 HH PPS REVENUE DEBIT

## 2021-01-31 PROCEDURE — 3331090002 HH PPS REVENUE DEBIT

## 2021-01-31 PROCEDURE — 3331090001 HH PPS REVENUE CREDIT

## 2021-02-01 VITALS
RESPIRATION RATE: 16 BRPM | DIASTOLIC BLOOD PRESSURE: 60 MMHG | OXYGEN SATURATION: 98 % | TEMPERATURE: 97 F | HEART RATE: 117 BPM | SYSTOLIC BLOOD PRESSURE: 108 MMHG

## 2021-02-01 PROCEDURE — 3331090002 HH PPS REVENUE DEBIT

## 2021-02-01 PROCEDURE — 3331090001 HH PPS REVENUE CREDIT

## 2021-02-02 ENCOUNTER — HOME CARE VISIT (OUTPATIENT)
Dept: HOME HEALTH SERVICES | Facility: HOME HEALTH | Age: 81
End: 2021-02-02
Payer: MEDICARE

## 2021-02-02 ENCOUNTER — HOME CARE VISIT (OUTPATIENT)
Dept: SCHEDULING | Facility: HOME HEALTH | Age: 81
End: 2021-02-02
Payer: MEDICARE

## 2021-02-02 VITALS
SYSTOLIC BLOOD PRESSURE: 128 MMHG | TEMPERATURE: 98.4 F | RESPIRATION RATE: 16 BRPM | OXYGEN SATURATION: 98 % | HEART RATE: 96 BPM | DIASTOLIC BLOOD PRESSURE: 72 MMHG

## 2021-02-02 PROCEDURE — G0157 HHC PT ASSISTANT EA 15: HCPCS

## 2021-02-02 PROCEDURE — 3331090002 HH PPS REVENUE DEBIT

## 2021-02-02 PROCEDURE — 3331090001 HH PPS REVENUE CREDIT

## 2021-02-03 PROCEDURE — 3331090002 HH PPS REVENUE DEBIT

## 2021-02-03 PROCEDURE — 3331090001 HH PPS REVENUE CREDIT

## 2021-02-04 ENCOUNTER — HOME CARE VISIT (OUTPATIENT)
Dept: SCHEDULING | Facility: HOME HEALTH | Age: 81
End: 2021-02-04
Payer: MEDICARE

## 2021-02-04 VITALS
HEART RATE: 78 BPM | DIASTOLIC BLOOD PRESSURE: 84 MMHG | OXYGEN SATURATION: 98 % | TEMPERATURE: 97.8 F | SYSTOLIC BLOOD PRESSURE: 128 MMHG | RESPIRATION RATE: 18 BRPM

## 2021-02-04 PROCEDURE — G0151 HHCP-SERV OF PT,EA 15 MIN: HCPCS

## 2021-02-04 PROCEDURE — 3331090002 HH PPS REVENUE DEBIT

## 2021-02-04 PROCEDURE — 3331090001 HH PPS REVENUE CREDIT

## 2021-02-07 VITALS
DIASTOLIC BLOOD PRESSURE: 95 MMHG | HEART RATE: 110 BPM | TEMPERATURE: 98.9 F | WEIGHT: 260 LBS | HEIGHT: 68 IN | RESPIRATION RATE: 20 BRPM | BODY MASS INDEX: 39.4 KG/M2 | SYSTOLIC BLOOD PRESSURE: 127 MMHG | OXYGEN SATURATION: 100 %

## 2021-02-19 ENCOUNTER — HOSPITAL ENCOUNTER (OUTPATIENT)
Dept: CT IMAGING | Age: 81
Discharge: HOME | End: 2021-02-19
Attending: NEUROLOGICAL SURGERY
Payer: MEDICARE

## 2021-02-19 DIAGNOSIS — I60.9 SUBARACHNOID HEMORRHAGE (HCC): ICD-10-CM

## 2021-02-19 DIAGNOSIS — I62.03 CHRONIC SUBDURAL HEMATOMA (HCC): ICD-10-CM

## 2021-02-19 PROCEDURE — 70496 CT ANGIOGRAPHY HEAD: CPT

## 2021-02-19 PROCEDURE — 74011000636 HC RX REV CODE- 636: Performed by: NEUROLOGICAL SURGERY

## 2021-02-19 PROCEDURE — 74011000258 HC RX REV CODE- 258: Performed by: NEUROLOGICAL SURGERY

## 2021-02-19 PROCEDURE — 70450 CT HEAD/BRAIN W/O DYE: CPT

## 2021-02-19 RX ORDER — SODIUM CHLORIDE 0.9 % (FLUSH) 0.9 %
10 SYRINGE (ML) INJECTION
Status: COMPLETED | OUTPATIENT
Start: 2021-02-19 | End: 2021-02-19

## 2021-02-19 RX ADMIN — IOPAMIDOL 50 ML: 755 INJECTION, SOLUTION INTRAVENOUS at 11:31

## 2021-02-19 RX ADMIN — SODIUM CHLORIDE 100 ML: 900 INJECTION, SOLUTION INTRAVENOUS at 11:31

## 2021-02-19 RX ADMIN — Medication 10 ML: at 11:31

## 2021-03-29 ENCOUNTER — APPOINTMENT (OUTPATIENT)
Dept: CT IMAGING | Age: 81
End: 2021-03-29
Attending: EMERGENCY MEDICINE
Payer: MEDICARE

## 2021-03-29 ENCOUNTER — HOSPITAL ENCOUNTER (EMERGENCY)
Age: 81
Discharge: HOME | End: 2021-03-29
Attending: EMERGENCY MEDICINE
Payer: MEDICARE

## 2021-03-29 VITALS
RESPIRATION RATE: 23 BRPM | SYSTOLIC BLOOD PRESSURE: 119 MMHG | HEART RATE: 80 BPM | TEMPERATURE: 98.5 F | WEIGHT: 240 LBS | HEIGHT: 67 IN | BODY MASS INDEX: 37.67 KG/M2 | OXYGEN SATURATION: 97 % | DIASTOLIC BLOOD PRESSURE: 68 MMHG

## 2021-03-29 DIAGNOSIS — R42 DIZZINESS: Primary | ICD-10-CM

## 2021-03-29 LAB
ALBUMIN SERPL-MCNC: 3.6 G/DL (ref 3.2–4.6)
ALBUMIN/GLOB SERPL: 1 {RATIO} (ref 1.2–3.5)
ALP SERPL-CCNC: 76 U/L (ref 50–136)
ALT SERPL-CCNC: 19 U/L (ref 12–65)
ANION GAP SERPL CALC-SCNC: 7 MMOL/L (ref 7–16)
AST SERPL-CCNC: 12 U/L (ref 15–37)
ATRIAL RATE: 100 BPM
BASOPHILS # BLD: 0.1 K/UL (ref 0–0.2)
BASOPHILS NFR BLD: 2 % (ref 0–2)
BILIRUB SERPL-MCNC: 0.6 MG/DL (ref 0.2–1.1)
BUN SERPL-MCNC: 11 MG/DL (ref 8–23)
CALCIUM SERPL-MCNC: 8.8 MG/DL (ref 8.3–10.4)
CALCULATED P AXIS, ECG09: 68 DEGREES
CALCULATED R AXIS, ECG10: 54 DEGREES
CALCULATED T AXIS, ECG11: 58 DEGREES
CHLORIDE SERPL-SCNC: 112 MMOL/L (ref 98–107)
CO2 SERPL-SCNC: 23 MMOL/L (ref 21–32)
CREAT SERPL-MCNC: 1.43 MG/DL (ref 0.8–1.5)
DIAGNOSIS, 93000: NORMAL
DIFFERENTIAL METHOD BLD: ABNORMAL
EOSINOPHIL # BLD: 0.7 K/UL (ref 0–0.8)
EOSINOPHIL NFR BLD: 12 % (ref 0.5–7.8)
ERYTHROCYTE [DISTWIDTH] IN BLOOD BY AUTOMATED COUNT: 12.8 % (ref 11.9–14.6)
GLOBULIN SER CALC-MCNC: 3.7 G/DL (ref 2.3–3.5)
GLUCOSE SERPL-MCNC: 146 MG/DL (ref 65–100)
HCT VFR BLD AUTO: 39.1 % (ref 41.1–50.3)
HGB BLD-MCNC: 12.6 G/DL (ref 13.6–17.2)
IMM GRANULOCYTES # BLD AUTO: 0 K/UL (ref 0–0.5)
IMM GRANULOCYTES NFR BLD AUTO: 0 % (ref 0–5)
LYMPHOCYTES # BLD: 2.9 K/UL (ref 0.5–4.6)
LYMPHOCYTES NFR BLD: 48 % (ref 13–44)
MCH RBC QN AUTO: 31.3 PG (ref 26.1–32.9)
MCHC RBC AUTO-ENTMCNC: 32.2 G/DL (ref 31.4–35)
MCV RBC AUTO: 97.3 FL (ref 79.6–97.8)
MONOCYTES # BLD: 0.5 K/UL (ref 0.1–1.3)
MONOCYTES NFR BLD: 8 % (ref 4–12)
NEUTS SEG # BLD: 1.9 K/UL (ref 1.7–8.2)
NEUTS SEG NFR BLD: 31 % (ref 43–78)
NRBC # BLD: 0 K/UL (ref 0–0.2)
P-R INTERVAL, ECG05: 172 MS
PLATELET # BLD AUTO: 220 K/UL (ref 150–450)
PMV BLD AUTO: 10.1 FL (ref 9.4–12.3)
POTASSIUM SERPL-SCNC: 3.4 MMOL/L (ref 3.5–5.1)
PROT SERPL-MCNC: 7.3 G/DL (ref 6.3–8.2)
Q-T INTERVAL, ECG07: 328 MS
QRS DURATION, ECG06: 80 MS
QTC CALCULATION (BEZET), ECG08: 423 MS
RBC # BLD AUTO: 4.02 M/UL (ref 4.23–5.6)
SODIUM SERPL-SCNC: 142 MMOL/L (ref 136–145)
VENTRICULAR RATE, ECG03: 100 BPM
WBC # BLD AUTO: 6 K/UL (ref 4.3–11.1)

## 2021-03-29 PROCEDURE — 80053 COMPREHEN METABOLIC PANEL: CPT

## 2021-03-29 PROCEDURE — 93005 ELECTROCARDIOGRAM TRACING: CPT | Performed by: EMERGENCY MEDICINE

## 2021-03-29 PROCEDURE — 70450 CT HEAD/BRAIN W/O DYE: CPT

## 2021-03-29 PROCEDURE — 85025 COMPLETE CBC W/AUTO DIFF WBC: CPT

## 2021-03-29 PROCEDURE — 99284 EMERGENCY DEPT VISIT MOD MDM: CPT

## 2021-03-29 NOTE — ED NOTES
I have reviewed discharge instructions with the patient. The patient verbalized understanding. Patient left ED via Discharge Method: ambulatory to Home with rideshare. Opportunity for questions and clarification provided. Patient given 0 scripts. To continue your aftercare when you leave the hospital, you may receive an automated call from our care team to check in on how you are doing. This is a free service and part of our promise to provide the best care and service to meet your aftercare needs.  If you have questions, or wish to unsubscribe from this service please call [phone]. Thank you for Choosing our [facility] Emergency Department.

## 2021-03-29 NOTE — DISCHARGE INSTRUCTIONS
Continue with your current medications and follow-up with your doctor. Return to the emergency department for any other acute concerns.

## 2021-03-29 NOTE — ED PROVIDER NOTES
Patient is an 79-year-old male with history of hypertension and a prior subarachnoid hemorrhage 2 months ago. States he was told that he is going to have episodes of dizziness for several months because of the prior hemorrhage. That happens on a daily basis often worse when he moves his head. Tonight he states the dizziness felt a little bit different when he woke up after falling asleep in a chair. He also states he had some tingling in his left arm. There was no numbness or weakness. No headache. No loss of consciousness. The tingling is since resolved. The history is provided by the patient. Dizziness  This is a chronic problem. The problem has not changed since onset. There was no focality noted. Pertinent negatives include no focal weakness, no slurred speech, no speech difficulty and no mental status change. There has been no fever. Pertinent negatives include no shortness of breath, no chest pain, no vomiting, no altered mental status, no headaches, no nausea, no bowel incontinence and no bladder incontinence. Associated medical issues include CVA. Associated medical issues do not include trauma or seizures. No past medical history on file. No past surgical history on file. No family history on file.     Social History     Socioeconomic History    Marital status:      Spouse name: Not on file    Number of children: Not on file    Years of education: Not on file    Highest education level: Not on file   Occupational History    Not on file   Social Needs    Financial resource strain: Not on file    Food insecurity     Worry: Not on file     Inability: Not on file    Transportation needs     Medical: Not on file     Non-medical: Not on file   Tobacco Use    Smoking status: Former Smoker     Types: Cigarettes     Quit date:      Years since quittin.2    Smokeless tobacco: Never Used   Substance and Sexual Activity    Alcohol use: Not on file    Drug use: Not on file    Sexual activity: Not on file   Lifestyle    Physical activity     Days per week: Not on file     Minutes per session: Not on file    Stress: Not on file   Relationships    Social connections     Talks on phone: Not on file     Gets together: Not on file     Attends Advent service: Not on file     Active member of club or organization: Not on file     Attends meetings of clubs or organizations: Not on file     Relationship status: Not on file    Intimate partner violence     Fear of current or ex partner: Not on file     Emotionally abused: Not on file     Physically abused: Not on file     Forced sexual activity: Not on file   Other Topics Concern    Not on file   Social History Narrative    Not on file         ALLERGIES: Patient has no known allergies. Review of Systems   Constitutional: Negative for chills, fatigue and fever. HENT: Negative for congestion, rhinorrhea and sore throat. Eyes: Negative for pain, discharge and visual disturbance. Respiratory: Negative for cough and shortness of breath. Cardiovascular: Negative for chest pain and palpitations. Gastrointestinal: Negative for abdominal pain, bowel incontinence, diarrhea, nausea and vomiting. Endocrine: Negative for polydipsia and polyuria. Genitourinary: Negative for bladder incontinence, dysuria, frequency and urgency. Musculoskeletal: Negative for back pain and neck pain. Skin: Negative for rash. Neurological: Positive for dizziness. Negative for focal weakness, seizures, syncope, facial asymmetry, speech difficulty, weakness and headaches. Hematological: Negative. Vitals:    03/29/21 0205 03/29/21 0210   BP: (!) 148/81    Pulse: (!) 103    Resp: 16    Temp:  98.5 °F (36.9 °C)   SpO2: 97%    Weight: 108.9 kg (240 lb)    Height: 5' 7\" (1.702 m)             Physical Exam  Vitals signs and nursing note reviewed. Constitutional:       Appearance: Normal appearance. He is well-developed.    HENT: Head: Normocephalic and atraumatic. Nose: Nose normal.   Eyes:      Extraocular Movements: Extraocular movements intact. Conjunctiva/sclera: Conjunctivae normal.      Pupils: Pupils are equal, round, and reactive to light. Neck:      Musculoskeletal: Normal range of motion and neck supple. Cardiovascular:      Rate and Rhythm: Regular rhythm. Tachycardia present. Pulses: Normal pulses. Heart sounds: Normal heart sounds. Pulmonary:      Effort: Pulmonary effort is normal.      Breath sounds: Normal breath sounds. Abdominal:      Palpations: Abdomen is soft. Tenderness: There is no abdominal tenderness. There is no guarding or rebound. Musculoskeletal: Normal range of motion. General: No tenderness. Lymphadenopathy:      Cervical: No cervical adenopathy. Skin:     General: Skin is warm and dry. Findings: No rash. Neurological:      General: No focal deficit present. Mental Status: He is alert and oriented to person, place, and time. Mental status is at baseline. GCS: GCS eye subscore is 4. GCS verbal subscore is 5. GCS motor subscore is 6. Cranial Nerves: No cranial nerve deficit. Sensory: No sensory deficit. Motor: Motor function is intact. No weakness. Coordination: Coordination normal.   Psychiatric:         Mood and Affect: Mood is anxious. MDM  Number of Diagnoses or Management Options  Diagnosis management comments: I wore appropriate PPE throughout this patient's ED visit. Fercho Manuel MD, 2:19 AM    Differential diagnosis includes stroke, subarachnoid hemorrhage, TIA, anxiety, hypertension, orthostasis, dehydration, electrolyte imbalance, arrhythmia    Records were reviewed and he did have subarachnoid hemorrhage in January. He has seen his primary care doctor in follow-up multiple times and they are adjusting his blood pressure medications to try and help the dizziness.     3:22 AM  Scan of the head negative  Basic labs unremarkable    Neurologic exam is remain normal    Advised to follow-up with his primary care physician. Voice dictation software was used during the making of this note. This software is not perfect and grammatical and other typographical errors may be present. This note has been proofread, but may still contain errors. Landon Bustillos MD; 3/29/2021 @3:23 AM   ===================================================================      EKG reviewed by me shows normal sinus rhythm rate of 100. No acute ST segment elevation. Normal axis. Normal intervals. Code S was not called as neurologic symptoms have completely resolved and he has had prior hemorrhagic stroke so he is not a thrombolytic candidate.        Amount and/or Complexity of Data Reviewed  Clinical lab tests: ordered and reviewed  Tests in the radiology section of CPT®: ordered and reviewed  Tests in the medicine section of CPT®: ordered and reviewed  Review and summarize past medical records: yes  Independent visualization of images, tracings, or specimens: yes    Risk of Complications, Morbidity, and/or Mortality  Presenting problems: moderate  Diagnostic procedures: moderate  Management options: low    Patient Progress  Patient progress: improved         Procedures

## 2021-03-29 NOTE — ED TRIAGE NOTES
Patient arrives to ED via EMS from home. Patient had dizziness and tingling in his left arm x 3 hours ago. Patient states the dizziness happens when he moves his head around fast. Denies chest pain or SOB.        BP: 140/80  HR: 110

## 2021-05-28 ENCOUNTER — HOSPITAL ENCOUNTER (EMERGENCY)
Age: 81
Discharge: HOME | End: 2021-05-28
Attending: EMERGENCY MEDICINE
Payer: MEDICARE

## 2021-05-28 ENCOUNTER — HOSPITAL ENCOUNTER (EMERGENCY)
Dept: CT IMAGING | Age: 81
Discharge: HOME | End: 2021-05-28
Attending: EMERGENCY MEDICINE
Payer: MEDICARE

## 2021-05-28 VITALS
RESPIRATION RATE: 18 BRPM | HEIGHT: 67 IN | BODY MASS INDEX: 34.53 KG/M2 | SYSTOLIC BLOOD PRESSURE: 130 MMHG | HEART RATE: 95 BPM | WEIGHT: 220 LBS | DIASTOLIC BLOOD PRESSURE: 80 MMHG | OXYGEN SATURATION: 95 % | TEMPERATURE: 98 F

## 2021-05-28 DIAGNOSIS — R42 DIZZINESS: Primary | ICD-10-CM

## 2021-05-28 LAB
ALBUMIN SERPL-MCNC: 3.7 G/DL (ref 3.2–4.6)
ALBUMIN/GLOB SERPL: 0.9 {RATIO} (ref 1.2–3.5)
ALP SERPL-CCNC: 77 U/L (ref 50–136)
ALT SERPL-CCNC: 21 U/L (ref 12–65)
ANION GAP SERPL CALC-SCNC: 8 MMOL/L (ref 7–16)
AST SERPL-CCNC: 12 U/L (ref 15–37)
BASOPHILS # BLD: 0.1 K/UL (ref 0–0.2)
BASOPHILS NFR BLD: 1 % (ref 0–2)
BILIRUB SERPL-MCNC: 0.6 MG/DL (ref 0.2–1.1)
BUN SERPL-MCNC: 17 MG/DL (ref 8–23)
CALCIUM SERPL-MCNC: 8.8 MG/DL (ref 8.3–10.4)
CHLORIDE SERPL-SCNC: 111 MMOL/L (ref 98–107)
CO2 SERPL-SCNC: 23 MMOL/L (ref 21–32)
CREAT SERPL-MCNC: 1.48 MG/DL (ref 0.8–1.5)
DIFFERENTIAL METHOD BLD: ABNORMAL
EOSINOPHIL # BLD: 0.4 K/UL (ref 0–0.8)
EOSINOPHIL NFR BLD: 7 % (ref 0.5–7.8)
ERYTHROCYTE [DISTWIDTH] IN BLOOD BY AUTOMATED COUNT: 12.7 % (ref 11.9–14.6)
GLOBULIN SER CALC-MCNC: 4.1 G/DL (ref 2.3–3.5)
GLUCOSE SERPL-MCNC: 99 MG/DL (ref 65–100)
HCT VFR BLD AUTO: 41.1 % (ref 41.1–50.3)
HGB BLD-MCNC: 13.5 G/DL (ref 13.6–17.2)
IMM GRANULOCYTES # BLD AUTO: 0 K/UL (ref 0–0.5)
IMM GRANULOCYTES NFR BLD AUTO: 0 % (ref 0–5)
LIPASE SERPL-CCNC: 222 U/L (ref 73–393)
LYMPHOCYTES # BLD: 2.3 K/UL (ref 0.5–4.6)
LYMPHOCYTES NFR BLD: 41 % (ref 13–44)
MAGNESIUM SERPL-MCNC: 2.3 MG/DL (ref 1.8–2.4)
MCH RBC QN AUTO: 31.5 PG (ref 26.1–32.9)
MCHC RBC AUTO-ENTMCNC: 32.8 G/DL (ref 31.4–35)
MCV RBC AUTO: 96 FL (ref 79.6–97.8)
MONOCYTES # BLD: 0.4 K/UL (ref 0.1–1.3)
MONOCYTES NFR BLD: 8 % (ref 4–12)
NEUTS SEG # BLD: 2.4 K/UL (ref 1.7–8.2)
NEUTS SEG NFR BLD: 43 % (ref 43–78)
NRBC # BLD: 0 K/UL (ref 0–0.2)
PLATELET # BLD AUTO: 232 K/UL (ref 150–450)
PMV BLD AUTO: 9.9 FL (ref 9.4–12.3)
POTASSIUM SERPL-SCNC: 3.8 MMOL/L (ref 3.5–5.1)
PROT SERPL-MCNC: 7.8 G/DL (ref 6.3–8.2)
RBC # BLD AUTO: 4.28 M/UL (ref 4.23–5.6)
SODIUM SERPL-SCNC: 142 MMOL/L (ref 136–145)
TROPONIN-HIGH SENSITIVITY: 5.6 PG/ML (ref 0–14)
WBC # BLD AUTO: 5.6 K/UL (ref 4.3–11.1)

## 2021-05-28 PROCEDURE — 80053 COMPREHEN METABOLIC PANEL: CPT

## 2021-05-28 PROCEDURE — 84484 ASSAY OF TROPONIN QUANT: CPT

## 2021-05-28 PROCEDURE — 83690 ASSAY OF LIPASE: CPT

## 2021-05-28 PROCEDURE — 85025 COMPLETE CBC W/AUTO DIFF WBC: CPT

## 2021-05-28 PROCEDURE — 83735 ASSAY OF MAGNESIUM: CPT

## 2021-05-28 PROCEDURE — 99283 EMERGENCY DEPT VISIT LOW MDM: CPT

## 2021-05-28 PROCEDURE — 93005 ELECTROCARDIOGRAM TRACING: CPT | Performed by: EMERGENCY MEDICINE

## 2021-05-28 PROCEDURE — 70450 CT HEAD/BRAIN W/O DYE: CPT

## 2021-05-28 RX ORDER — SODIUM CHLORIDE 0.9 % (FLUSH) 0.9 %
5-10 SYRINGE (ML) INJECTION AS NEEDED
Status: DISCONTINUED | OUTPATIENT
Start: 2021-05-28 | End: 2021-05-28 | Stop reason: HOSPADM

## 2021-05-28 RX ORDER — SODIUM CHLORIDE 0.9 % (FLUSH) 0.9 %
5-10 SYRINGE (ML) INJECTION EVERY 8 HOURS
Status: DISCONTINUED | OUTPATIENT
Start: 2021-05-28 | End: 2021-05-28 | Stop reason: HOSPADM

## 2021-05-28 NOTE — ED NOTES
I have reviewed discharge instructions with the patient. The patient verbalized understanding. Patient left ED via Discharge Method: ambulatory to Home with self . Opportunity for questions and clarification provided. Patient given 0 scripts. To continue your aftercare when you leave the hospital, you may receive an automated call from our care team to check in on how you are doing. This is a free service and part of our promise to provide the best care and service to meet your aftercare needs.  If you have questions, or wish to unsubscribe from this service please call [phone]. Thank you for Choosing our [facility] Emergency Department.

## 2021-05-28 NOTE — ED TRIAGE NOTES
Ambulatory to triage without difficulty. Pt states at 1pm he had a sudden onset of dizziness while working a crossword puzzle. Sensation intact. States he has a hemorrhagic stroke back in January. Dr Natalia Trinidad to triage. No code S called.

## 2021-05-28 NOTE — ED PROVIDER NOTES
Patient is an 76-year-old male with a history of a hemorrhagic stroke in January. The patient said everything had been going well and he was told he may have some dizziness after the stroke but today he was doing his crossword puzzle and all of a sudden became very dizzy. He said it felt like he was going to fall out. The patient said that those were the only symptoms he had. He says the dizziness has resolved to this point. The patient notes that he did not have any weakness in arms or legs and he did not have any aphasia. No past medical history on file. No past surgical history on file. No family history on file. Social History     Socioeconomic History    Marital status:      Spouse name: Not on file    Number of children: Not on file    Years of education: Not on file    Highest education level: Not on file   Occupational History    Not on file   Tobacco Use    Smoking status: Former Smoker     Types: Cigarettes     Quit date:      Years since quittin.4    Smokeless tobacco: Never Used   Substance and Sexual Activity    Alcohol use: Not on file    Drug use: Not on file    Sexual activity: Not on file   Other Topics Concern    Not on file   Social History Narrative    Not on file     Social Determinants of Health     Financial Resource Strain:     Difficulty of Paying Living Expenses:    Food Insecurity:     Worried About [address] [facility] in the Last Year:     [address] in the Last Year:    Transportation Needs:     Lack of Transportation (Medical):      Lack of Transportation (Non-Medical):    Physical Activity:     Days of Exercise per Week:     Minutes of Exercise per Session:    Stress:     Feeling of Stress :    Social Connections:     Frequency of Communication with Friends and Family:     Frequency of Social Gatherings with Friends and Family:     Attends Evangel[employer] Services:     Active Member of Clubs or Organizations:     Attends Club or Organization Meetings:     Marital Status:    Intimate Partner Violence:     Fear of Current or Ex-Partner:     Emotionally Abused:     Physically Abused:     Sexually Abused: ALLERGIES: Patient has no known allergies. Review of Systems   Neurological: Positive for dizziness. All other systems reviewed and are negative. Vitals:    05/28/21 1456   BP: (!) 145/78   Pulse: (!) 107   Resp: 16   Temp: 98.3 °F (36.8 °C)   SpO2: 96%   Weight: 99.8 kg (220 lb)   Height: 5' 7\" (1.702 m)            Physical Exam     GENERAL:The patient has Body mass index is 34.46 kg/m². Well-hydrated. No acute distress. VITAL SIGNS: Heart rate, blood pressure, respiratory rate reviewed as recorded in  nurse's notes  EYES: Pupils reactive. Extraocular motion intact. No conjunctival redness or drainage. NECK: Supple, no meningeal signs. Trachea midline. No masses or thyromegaly. LUNGS: Breath sounds clear and equal bilaterally no accessory muscle use  CHEST: No deformity  CARDIOVASCULAR: Regular rate and rhythm  ABDOMEN: Soft without tenderness. No palpable masses or organomegaly. No  peritoneal signs. No rigidity. EXTREMITIES: No clubbing or cyanosis. No joint swelling. Normal muscle tone. No  restricted range of motion appreciated. NEUROLOGIC: The patient's face is symmetrical, tongue is midline and speech is clear. Pronator drift negative in the arms and legs. The patient has normal sensation V1 through 3.  strength 5 out of 5 equal bilaterally. No ataxia present. SKIN: No rash or petechiae. Good skin turgor palpated. PSYCHIATRIC: Alert and oriented. Appropriate behavior and judgment.       MDM  Number of Diagnoses or Management Options  Diagnosis management comments: TIA, CVA, intracranial hemorrhage, ischemic stroke, brain tumor, Bell's palsy,    tension headache, migraine headache,    peripheral neuropathy, metabolic disorder, Guillian Yorkshire syndrome, multiple sclerosis,    electrolyte abnormality           Amount and/or Complexity of Data Reviewed  Clinical lab tests: reviewed and ordered  Tests in the radiology section of CPT®: reviewed and ordered  Review and summarize past medical records: yes  Independent visualization of images, tracings, or specimens: yes      ED Course as of May 28 1650   Fri May 28, 2021   1648 IMPRESSION  1. No acute intracranial abnormality. 2. Sequela of old infarct in the left basal ganglia and internal capsule. CT HEAD WO CONT [KH]   4717 Patient continues to do well right now. I talked to him about the findings on his blood work and imaging study here in the emergency department. The patient and I discussed continuing his normal medications and outpatient follow-up with family doctor next week as needed. [KH]      ED Course User Index  [SHAYE] Noy Humphrey DO       EKG    Date/Time: 5/28/2021 3:07 PM  Performed by: Noy Humphrey DO  Authorized by: Noy Humphrey DO     ECG reviewed by ED Physician in the absence of a cardiologist: yes    Comments:      Sinus tachycardia with narrow QRS complex and no ST elevation.

## 2021-05-28 NOTE — DISCHARGE INSTRUCTIONS
Follow-up with your family doctor next week as needed or if you start developing any new or concerning symptoms please return to the emergency department at that time.

## 2021-05-29 LAB
ATRIAL RATE: 109 BPM
CALCULATED P AXIS, ECG09: 57 DEGREES
CALCULATED R AXIS, ECG10: 33 DEGREES
CALCULATED T AXIS, ECG11: 48 DEGREES
DIAGNOSIS, 93000: NORMAL
P-R INTERVAL, ECG05: 142 MS
Q-T INTERVAL, ECG07: 320 MS
QRS DURATION, ECG06: 78 MS
QTC CALCULATION (BEZET), ECG08: 430 MS
VENTRICULAR RATE, ECG03: 109 BPM

## 2021-06-08 NOTE — CONSULTS
Comprehensive Nutrition Assessment    Type and Reason for Visit: Initial, Consult  General nutrition management/ other reason SAH protocol (Neurosurgery)    Nutrition Recommendations/Plan:    Advance diet per SLP as medically appropriate.  If patient to remain NPO for prolonged period pursue NG for primary needs as appropriate with plan of care and consult RD for TF management. Malnutrition Assessment:  Malnutrition Status: No malnutrition    Nutrition Assessment:   Nutrition History: Pt denies any change in oral intake pta. He indicates when and how frequently he eats is dependent on who is home and what time he gets up. Nutrition Background: PMH remarkable for HTN, Hyperlipidemia, BPH, asthma, anemia, Syncope. Admitted with [address] Daily Update:  Visit with pt at bedside. He denies any acute nutrition issues.   Hopes he will be able to eat after angiogram.      Nutrition Related Findings:   No physical findings indicative of malnutrition       Current Nutrition Therapies:  DIET NPO Except Meds    Current Intake:   Average Meal Intake: NPO Average Supplement Intake: None ordered      Anthropometric Measures:  Height: 5' 7.5\" (171.5 cm)  Current Body Wt: 117.9 kg (259 lb 14.8 oz)(1/5), Weight source: Not specified  BMI: 40.1, Obese class 3 (BMI 40.0 or greater)  Admission Body Weight: 259 lb 14.8 oz(no source)  Ideal Body Wt: 151 lbs (69 kg), 172.1 %  Usual Body Wt: (denies change), Percent weight change:            Estimated Daily Nutrient Needs:  Energy (kcal/day): 0418-2158 (Kcal/kg(15-20), Weight Used: Admission(117.9 kg))  Protein (g/day):  g (20%/kcal) Weight Used: (Admission)  Fluid (ml/day):   (1 ml/kcal)    Nutrition Diagnosis:   · No nutrition diagnosis at this time related to   as evidenced by      Nutrition Interventions:   Food and/or Nutrient Delivery: Continue NPO(Diet per SLP.)     Coordination of Nutrition Care: Continue to monitor while inpatient  Plan of Care discussed with Faxed. Pt informed.    Bj    Goals: Active Goal: Tolerate initiation of oral diet within 3 days    Nutrition Monitoring and Evaluation:      Food/Nutrient Intake Outcomes: Diet advancement/tolerance     Discharge Planning:     Too soon to determine    Bre Douglas RD, LDN on 1/6/2021 at 3:31 PM  Contact: [phone]    Disaster Mode active

## 2021-10-28 ENCOUNTER — HOSPITAL ENCOUNTER (EMERGENCY)
Age: 81
Discharge: HOME | End: 2021-10-28
Attending: EMERGENCY MEDICINE
Payer: MEDICARE

## 2021-10-28 VITALS
TEMPERATURE: 98.1 F | DIASTOLIC BLOOD PRESSURE: 76 MMHG | BODY MASS INDEX: 37.67 KG/M2 | HEART RATE: 84 BPM | WEIGHT: 240 LBS | RESPIRATION RATE: 16 BRPM | OXYGEN SATURATION: 94 % | SYSTOLIC BLOOD PRESSURE: 144 MMHG | HEIGHT: 67 IN

## 2021-10-28 DIAGNOSIS — R51.9 ACUTE NONINTRACTABLE HEADACHE, UNSPECIFIED HEADACHE TYPE: Primary | ICD-10-CM

## 2021-10-28 PROCEDURE — 99281 EMR DPT VST MAYX REQ PHY/QHP: CPT

## 2021-10-28 NOTE — DISCHARGE INSTRUCTIONS
Use Tylenol as needed for pain or headache. Continue with your current medications. Follow-up with your doctor or return to the ER for any other acute concerns.

## 2021-10-28 NOTE — ED TRIAGE NOTES
Pt ambulatory to triage with CO a HA this afternoon that prompted him to take BP. BP at home was 147/95 and 30 mins later 157/100. Reports no unilateral weakness no vision changes. Reports HA has resolved.  Takes HTN medications and has been taking regularly

## 2021-10-28 NOTE — ED PROVIDER NOTES
Patient is an 67-year-old male with a history of hypertension who comes to the emergency department today concerned about elevated blood pressure. He states he was at his home this afternoon he had a small mild headache. He decided to check his blood pressure and noted that it was slightly elevated. No head injury or trauma. No syncope. No numbness or weakness. No chest pain or palpitations. He did take some Tylenol. The headache has since resolved. The history is provided by the patient. Hypertension   This is a new problem. The current episode started 3 to 5 hours ago. The problem has been resolved. Associated symptoms include headaches. Pertinent negatives include no chest pain, no palpitations, no blurred vision, no neck pain, no peripheral edema, no dizziness, no shortness of breath, no nausea and no vomiting. Risk factors include male gender and hypertension. No past medical history on file. No past surgical history on file. No family history on file. Social History     Socioeconomic History    Marital status:      Spouse name: Not on file    Number of children: Not on file    Years of education: Not on file    Highest education level: Not on file   Occupational History    Not on file   Tobacco Use    Smoking status: Former Smoker     Types: Cigarettes     Quit date:      Years since quittin.8    Smokeless tobacco: Never Used   Substance and Sexual Activity    Alcohol use: Not on file    Drug use: Not on file    Sexual activity: Not on file   Other Topics Concern    Not on file   Social History Narrative    Not on file     Social Determinants of Health     Financial Resource Strain:     Difficulty of Paying Living Expenses:    Food Insecurity:     Worried About [address] in the Last Year:     [address] in the Last Year:    Transportation Needs:     Lack of Transportation (Medical):      Lack of Transportation (Non-Medical): Physical Activity:     Days of Exercise per Week:     Minutes of Exercise per Session:    Stress:     Feeling of Stress :    Social Connections:     Frequency of Communication with Friends and Family:     Frequency of Social Gatherings with Friends and Family:     Attends [employer] Services:     Active Member of Clubs or Organizations:     Attends Club or Organization Meetings:     Marital Status:    Intimate Partner Violence:     Fear of Current or Ex-Partner:     Emotionally Abused:     Physically Abused:     Sexually Abused: ALLERGIES: Patient has no known allergies. Review of Systems   Constitutional: Negative for chills, fatigue and fever. HENT: Negative for congestion, rhinorrhea and sore throat. Eyes: Negative for blurred vision, pain, discharge and visual disturbance. Respiratory: Negative for cough and shortness of breath. Cardiovascular: Negative for chest pain and palpitations. Gastrointestinal: Negative for abdominal pain, diarrhea, nausea and vomiting. Endocrine: Negative for polydipsia and polyuria. Genitourinary: Negative for dysuria, frequency and urgency. Musculoskeletal: Negative for back pain and neck pain. Skin: Negative for rash. Neurological: Positive for headaches. Negative for dizziness, seizures, syncope and weakness. Hematological: Negative. Vitals:    10/28/21 1507   BP: (!) 148/91   Pulse: (!) 109   Resp: 16   Temp: 98.1 °F (36.7 °C)   SpO2: 97%   Weight: 108.9 kg (240 lb)   Height: 5' 7\" (1.702 m)            Physical Exam  Vitals and nursing note reviewed. Constitutional:       Appearance: Normal appearance. He is well-developed. Comments: Repeat blood pressure during my exam 138/75   HENT:      Head: Normocephalic and atraumatic. Nose: Nose normal.   Eyes:      Extraocular Movements: Extraocular movements intact. Conjunctiva/sclera: Conjunctivae normal.      Pupils: Pupils are equal, round, and reactive to light. Cardiovascular:      Rate and Rhythm: Normal rate and regular rhythm. Heart sounds: Normal heart sounds. Pulmonary:      Effort: Pulmonary effort is normal.      Breath sounds: Normal breath sounds. Abdominal:      Palpations: Abdomen is soft. Tenderness: There is no abdominal tenderness. There is no guarding or rebound. Musculoskeletal:         General: No tenderness. Normal range of motion. Cervical back: Normal range of motion and neck supple. Lymphadenopathy:      Cervical: No cervical adenopathy. Skin:     General: Skin is warm and dry. Findings: No rash. Neurological:      General: No focal deficit present. Mental Status: He is alert and oriented to person, place, and time. GCS: GCS eye subscore is 4. GCS verbal subscore is 5. GCS motor subscore is 6. Cranial Nerves: No cranial nerve deficit. Sensory: No sensory deficit. Motor: Motor function is intact. No weakness or tremor. Coordination: Coordination normal.      Comments: NIH 0          MDM  Number of Diagnoses or Management Options  Diagnosis management comments: I wore appropriate PPE throughout this patient's ED visit. Greyson Carroll MD, 3:57 PM    Blood pressure down to 515 systolic now neurologic exam completely normal.  Headache has resolved he had no neurologic changes. He is reassured after my evaluation and discussion. Advised him to use Tylenol for further pain or return to the ER for any new or worsening symptoms. Voice dictation software was used during the making of this note. This software is not perfect and grammatical and other typographical errors may be present. This note has been proofread, but may still contain errors.   Greyson Carroll MD; 10/28/2021 @3:59 PM   ===================================================================      Risk of Complications, Morbidity, and/or Mortality  Presenting problems: low  Diagnostic procedures: minimal  Management options: minimal    Patient Progress  Patient progress: improved         Procedures

## 2021-10-28 NOTE — ED NOTES
I have reviewed discharge instructions with the patient. The patient verbalized understanding. Patient left ED via Discharge Method: ambulatory to Home with family & self. Opportunity for questions and clarification provided. Patient given 0 scripts. No e-sign. To continue your aftercare when you leave the hospital, you may receive an automated call from our care team to check in on how you are doing. This is a free service and part of our promise to provide the best care and service to meet your aftercare needs.  If you have questions, or wish to unsubscribe from this service please call [phone]. Thank you for Choosing our [facility] Emergency Department.

## 2022-03-18 PROBLEM — I60.9 SAH (SUBARACHNOID HEMORRHAGE) (HCC): Status: ACTIVE | Noted: 2021-01-05

## 2022-03-18 PROBLEM — I63.9 STROKE (HCC): Status: ACTIVE | Noted: 2021-01-25

## 2022-03-18 PROBLEM — R41.0 TRANSIENT CONFUSION: Status: ACTIVE | Noted: 2021-01-24

## 2022-03-19 PROBLEM — S06.5XAA SUBDURAL HEMATOMA (HCC): Status: ACTIVE | Noted: 2021-01-24

## 2023-05-10 RX ORDER — TAMSULOSIN HYDROCHLORIDE 0.4 MG/1
0.4 CAPSULE ORAL DAILY
COMMUNITY

## 2023-05-10 RX ORDER — AMLODIPINE BESYLATE 5 MG/1
5 TABLET ORAL DAILY
COMMUNITY
Start: 2021-01-14

## 2023-05-10 RX ORDER — TELMISARTAN AND HYDROCHLORTHIAZIDE 40; 12.5 MG/1; MG/1
1 TABLET ORAL DAILY
COMMUNITY

## 2023-05-10 RX ORDER — ASPIRIN 81 MG/1
81 TABLET, CHEWABLE ORAL DAILY
COMMUNITY

## 2023-05-10 RX ORDER — BUDESONIDE AND FORMOTEROL FUMARATE DIHYDRATE 160; 4.5 UG/1; UG/1
2 AEROSOL RESPIRATORY (INHALATION) DAILY
COMMUNITY

## 2023-05-10 RX ORDER — LATANOPROST 50 UG/ML
1 SOLUTION/ DROPS OPHTHALMIC
COMMUNITY

## 2023-05-10 RX ORDER — BUTALBITAL, ACETAMINOPHEN AND CAFFEINE 50; 325; 40 MG/1; MG/1; MG/1
1 TABLET ORAL EVERY 4 HOURS PRN
COMMUNITY
Start: 2021-01-13

## 2023-05-10 RX ORDER — ACETAMINOPHEN 500 MG
500 TABLET ORAL EVERY 6 HOURS PRN
COMMUNITY

## 2023-05-10 RX ORDER — PRAVASTATIN SODIUM 40 MG
40 TABLET ORAL NIGHTLY
COMMUNITY